# Patient Record
Sex: FEMALE | Race: WHITE | Employment: FULL TIME | URBAN - METROPOLITAN AREA
[De-identification: names, ages, dates, MRNs, and addresses within clinical notes are randomized per-mention and may not be internally consistent; named-entity substitution may affect disease eponyms.]

---

## 2017-05-27 LAB
ALBUMIN SERPL-MCNC: NORMAL G/DL
ALP BLD-CCNC: NORMAL U/L
ALT SERPL-CCNC: NORMAL U/L
ANION GAP SERPL CALCULATED.3IONS-SCNC: NORMAL MMOL/L
AST SERPL-CCNC: NORMAL U/L
BILIRUB SERPL-MCNC: NORMAL MG/DL (ref 0.1–1.4)
BUN BLDV-MCNC: NORMAL MG/DL
CALCIUM SERPL-MCNC: NORMAL MG/DL
CHLORIDE BLD-SCNC: NORMAL MMOL/L
CHOLESTEROL, TOTAL: NORMAL MG/DL
CHOLESTEROL/HDL RATIO: NORMAL
CO2: NORMAL MMOL/L
CREAT SERPL-MCNC: NORMAL MG/DL
GFR CALCULATED: NORMAL
GLUCOSE BLD-MCNC: NORMAL MG/DL
HDLC SERPL-MCNC: NORMAL MG/DL (ref 35–70)
LDL CHOLESTEROL CALCULATED: NORMAL MG/DL (ref 0–160)
POTASSIUM SERPL-SCNC: NORMAL MMOL/L
SODIUM BLD-SCNC: NORMAL MMOL/L
TOTAL PROTEIN: NORMAL
TRIGL SERPL-MCNC: NORMAL MG/DL
VLDLC SERPL CALC-MCNC: NORMAL MG/DL

## 2019-09-16 RX ORDER — DESVENLAFAXINE 50 MG/1
TABLET, EXTENDED RELEASE ORAL
Qty: 90 TABLET | Refills: 2 | Status: SHIPPED
Start: 2019-09-16 | End: 2020-06-10 | Stop reason: SDUPTHER

## 2019-09-16 RX ORDER — PANTOPRAZOLE SODIUM 40 MG/1
TABLET, DELAYED RELEASE ORAL
Qty: 90 TABLET | Refills: 2 | Status: SHIPPED
Start: 2019-09-16 | End: 2020-06-10 | Stop reason: SDUPTHER

## 2019-09-16 RX ORDER — ROSUVASTATIN CALCIUM 5 MG/1
TABLET, COATED ORAL
Qty: 90 TABLET | Refills: 2 | Status: SHIPPED
Start: 2019-09-16 | End: 2020-06-10 | Stop reason: SDUPTHER

## 2019-10-11 RX ORDER — METFORMIN HYDROCHLORIDE 500 MG/1
TABLET, EXTENDED RELEASE ORAL
COMMUNITY
Start: 2018-09-25 | End: 2020-05-05 | Stop reason: SDUPTHER

## 2019-10-11 RX ORDER — IBUPROFEN 800 MG/1
TABLET ORAL
Status: ON HOLD | COMMUNITY
Start: 2016-06-06 | End: 2019-10-22 | Stop reason: HOSPADM

## 2019-10-11 RX ORDER — AMITRIPTYLINE HYDROCHLORIDE 25 MG/1
25 TABLET, FILM COATED ORAL NIGHTLY
COMMUNITY
Start: 2018-06-25 | End: 2020-03-24 | Stop reason: SDUPTHER

## 2019-10-21 ENCOUNTER — HOSPITAL ENCOUNTER (OUTPATIENT)
Age: 54
Setting detail: OBSERVATION
Discharge: HOME | End: 2019-10-22
Attending: EMERGENCY MEDICINE | Admitting: INTERNAL MEDICINE

## 2019-10-21 ENCOUNTER — APPOINTMENT (OUTPATIENT)
Dept: GENERAL RADIOLOGY | Age: 54
End: 2019-10-21

## 2019-10-21 DIAGNOSIS — I10 HYPERTENSION, UNSPECIFIED TYPE: ICD-10-CM

## 2019-10-21 DIAGNOSIS — R07.9 CHEST PAIN, UNSPECIFIED TYPE: Primary | ICD-10-CM

## 2019-10-21 LAB
ALBUMIN SERPL-MCNC: 4.4 G/DL (ref 3.5–5.2)
ALP BLD-CCNC: 95 U/L (ref 35–104)
ALT SERPL-CCNC: 36 U/L (ref 0–32)
ANION GAP SERPL CALCULATED.3IONS-SCNC: 11 MMOL/L (ref 7–16)
AST SERPL-CCNC: 27 U/L (ref 0–31)
BASOPHILS ABSOLUTE: 0.07 E9/L (ref 0–0.2)
BASOPHILS RELATIVE PERCENT: 1.1 % (ref 0–2)
BILIRUB SERPL-MCNC: 0.5 MG/DL (ref 0–1.2)
BUN BLDV-MCNC: 16 MG/DL (ref 6–20)
CALCIUM SERPL-MCNC: 9.6 MG/DL (ref 8.6–10.2)
CHLORIDE BLD-SCNC: 104 MMOL/L (ref 98–107)
CHOLESTEROL, TOTAL: 137 MG/DL (ref 0–199)
CO2: 27 MMOL/L (ref 22–29)
CREAT SERPL-MCNC: 0.9 MG/DL (ref 0.5–1)
EKG ATRIAL RATE: 66 BPM
EKG P AXIS: 50 DEGREES
EKG P-R INTERVAL: 152 MS
EKG Q-T INTERVAL: 422 MS
EKG QRS DURATION: 84 MS
EKG QTC CALCULATION (BAZETT): 442 MS
EKG R AXIS: 12 DEGREES
EKG T AXIS: 41 DEGREES
EKG VENTRICULAR RATE: 66 BPM
EOSINOPHILS ABSOLUTE: 0.25 E9/L (ref 0.05–0.5)
EOSINOPHILS RELATIVE PERCENT: 3.9 % (ref 0–6)
GFR AFRICAN AMERICAN: >60
GFR NON-AFRICAN AMERICAN: >60 ML/MIN/1.73
GLUCOSE BLD-MCNC: 159 MG/DL (ref 74–99)
HBA1C MFR BLD: 6.1 % (ref 4–5.6)
HCT VFR BLD CALC: 40 % (ref 34–48)
HDLC SERPL-MCNC: 31 MG/DL
HEMOGLOBIN: 13.2 G/DL (ref 11.5–15.5)
IMMATURE GRANULOCYTES #: 0.03 E9/L
IMMATURE GRANULOCYTES %: 0.5 % (ref 0–5)
LDL CHOLESTEROL CALCULATED: 77 MG/DL (ref 0–99)
LYMPHOCYTES ABSOLUTE: 1.36 E9/L (ref 1.5–4)
LYMPHOCYTES RELATIVE PERCENT: 21.1 % (ref 20–42)
MCH RBC QN AUTO: 30.3 PG (ref 26–35)
MCHC RBC AUTO-ENTMCNC: 33 % (ref 32–34.5)
MCV RBC AUTO: 91.7 FL (ref 80–99.9)
MONOCYTES ABSOLUTE: 0.67 E9/L (ref 0.1–0.95)
MONOCYTES RELATIVE PERCENT: 10.4 % (ref 2–12)
NEUTROPHILS ABSOLUTE: 4.08 E9/L (ref 1.8–7.3)
NEUTROPHILS RELATIVE PERCENT: 63 % (ref 43–80)
PDW BLD-RTO: 12.9 FL (ref 11.5–15)
PLATELET # BLD: 328 E9/L (ref 130–450)
PMV BLD AUTO: 9 FL (ref 7–12)
POTASSIUM SERPL-SCNC: 4.2 MMOL/L (ref 3.5–5)
RBC # BLD: 4.36 E12/L (ref 3.5–5.5)
SODIUM BLD-SCNC: 142 MMOL/L (ref 132–146)
TOTAL PROTEIN: 7.8 G/DL (ref 6.4–8.3)
TRIGL SERPL-MCNC: 147 MG/DL (ref 0–149)
TROPONIN: <0.01 NG/ML (ref 0–0.03)
VLDLC SERPL CALC-MCNC: 29 MG/DL
WBC # BLD: 6.5 E9/L (ref 4.5–11.5)

## 2019-10-21 PROCEDURE — 71046 X-RAY EXAM CHEST 2 VIEWS: CPT

## 2019-10-21 PROCEDURE — 99243 OFF/OP CNSLTJ NEW/EST LOW 30: CPT | Performed by: INTERNAL MEDICINE

## 2019-10-21 PROCEDURE — 93010 ELECTROCARDIOGRAM REPORT: CPT | Performed by: INTERNAL MEDICINE

## 2019-10-21 PROCEDURE — G0378 HOSPITAL OBSERVATION PER HR: HCPCS

## 2019-10-21 PROCEDURE — 80061 LIPID PANEL: CPT

## 2019-10-21 PROCEDURE — 83036 HEMOGLOBIN GLYCOSYLATED A1C: CPT

## 2019-10-21 PROCEDURE — 2580000003 HC RX 258: Performed by: INTERNAL MEDICINE

## 2019-10-21 PROCEDURE — 80053 COMPREHEN METABOLIC PANEL: CPT

## 2019-10-21 PROCEDURE — 84484 ASSAY OF TROPONIN QUANT: CPT

## 2019-10-21 PROCEDURE — 6370000000 HC RX 637 (ALT 250 FOR IP): Performed by: NURSE PRACTITIONER

## 2019-10-21 PROCEDURE — 6370000000 HC RX 637 (ALT 250 FOR IP): Performed by: INTERNAL MEDICINE

## 2019-10-21 PROCEDURE — 93005 ELECTROCARDIOGRAM TRACING: CPT | Performed by: EMERGENCY MEDICINE

## 2019-10-21 PROCEDURE — 85025 COMPLETE CBC W/AUTO DIFF WBC: CPT

## 2019-10-21 PROCEDURE — 2580000003 HC RX 258: Performed by: NURSE PRACTITIONER

## 2019-10-21 PROCEDURE — 99285 EMERGENCY DEPT VISIT HI MDM: CPT

## 2019-10-21 PROCEDURE — 36415 COLL VENOUS BLD VENIPUNCTURE: CPT

## 2019-10-21 RX ORDER — PANTOPRAZOLE SODIUM 40 MG/1
40 TABLET, DELAYED RELEASE ORAL DAILY
Status: DISCONTINUED | OUTPATIENT
Start: 2019-10-21 | End: 2019-10-22 | Stop reason: HOSPADM

## 2019-10-21 RX ORDER — AMITRIPTYLINE HYDROCHLORIDE 25 MG/1
25 TABLET, FILM COATED ORAL NIGHTLY
Status: DISCONTINUED | OUTPATIENT
Start: 2019-10-21 | End: 2019-10-22 | Stop reason: HOSPADM

## 2019-10-21 RX ORDER — CHOLECALCIFEROL (VITAMIN D3) 125 MCG
10 CAPSULE ORAL NIGHTLY
Status: DISCONTINUED | OUTPATIENT
Start: 2019-10-21 | End: 2019-10-22 | Stop reason: HOSPADM

## 2019-10-21 RX ORDER — DEXTROSE MONOHYDRATE 25 G/50ML
12.5 INJECTION, SOLUTION INTRAVENOUS PRN
Status: DISCONTINUED | OUTPATIENT
Start: 2019-10-21 | End: 2019-10-22 | Stop reason: HOSPADM

## 2019-10-21 RX ORDER — ROSUVASTATIN CALCIUM 5 MG/1
5 TABLET, COATED ORAL NIGHTLY
Status: DISCONTINUED | OUTPATIENT
Start: 2019-10-21 | End: 2019-10-22 | Stop reason: HOSPADM

## 2019-10-21 RX ORDER — ASPIRIN 81 MG/1
81 TABLET, CHEWABLE ORAL DAILY
Status: DISCONTINUED | OUTPATIENT
Start: 2019-10-21 | End: 2019-10-22 | Stop reason: HOSPADM

## 2019-10-21 RX ORDER — SODIUM CHLORIDE 0.9 % (FLUSH) 0.9 %
10 SYRINGE (ML) INJECTION PRN
Status: DISCONTINUED | OUTPATIENT
Start: 2019-10-21 | End: 2019-10-22 | Stop reason: HOSPADM

## 2019-10-21 RX ORDER — ONDANSETRON 2 MG/ML
4 INJECTION INTRAMUSCULAR; INTRAVENOUS EVERY 6 HOURS PRN
Status: DISCONTINUED | OUTPATIENT
Start: 2019-10-21 | End: 2019-10-22 | Stop reason: HOSPADM

## 2019-10-21 RX ORDER — METFORMIN HYDROCHLORIDE 500 MG/1
500 TABLET, EXTENDED RELEASE ORAL
Status: DISCONTINUED | OUTPATIENT
Start: 2019-10-22 | End: 2019-10-22 | Stop reason: HOSPADM

## 2019-10-21 RX ORDER — NITROGLYCERIN 0.4 MG/1
0.4 TABLET SUBLINGUAL ONCE
Status: COMPLETED | OUTPATIENT
Start: 2019-10-21 | End: 2019-10-21

## 2019-10-21 RX ORDER — 0.9 % SODIUM CHLORIDE 0.9 %
500 INTRAVENOUS SOLUTION INTRAVENOUS ONCE
Status: COMPLETED | OUTPATIENT
Start: 2019-10-21 | End: 2019-10-21

## 2019-10-21 RX ORDER — NICOTINE POLACRILEX 4 MG
15 LOZENGE BUCCAL PRN
Status: DISCONTINUED | OUTPATIENT
Start: 2019-10-21 | End: 2019-10-22 | Stop reason: HOSPADM

## 2019-10-21 RX ORDER — DESVENLAFAXINE 50 MG/1
50 TABLET, EXTENDED RELEASE ORAL DAILY
Status: DISCONTINUED | OUTPATIENT
Start: 2019-10-21 | End: 2019-10-22 | Stop reason: HOSPADM

## 2019-10-21 RX ORDER — SODIUM CHLORIDE 0.9 % (FLUSH) 0.9 %
10 SYRINGE (ML) INJECTION EVERY 12 HOURS SCHEDULED
Status: DISCONTINUED | OUTPATIENT
Start: 2019-10-21 | End: 2019-10-22 | Stop reason: HOSPADM

## 2019-10-21 RX ORDER — DEXTROSE MONOHYDRATE 50 MG/ML
100 INJECTION, SOLUTION INTRAVENOUS PRN
Status: DISCONTINUED | OUTPATIENT
Start: 2019-10-21 | End: 2019-10-22 | Stop reason: HOSPADM

## 2019-10-21 RX ORDER — ASPIRIN 81 MG/1
324 TABLET, CHEWABLE ORAL ONCE
Status: COMPLETED | OUTPATIENT
Start: 2019-10-21 | End: 2019-10-21

## 2019-10-21 RX ADMIN — ROSUVASTATIN CALCIUM 5 MG: 5 TABLET, FILM COATED ORAL at 21:58

## 2019-10-21 RX ADMIN — SODIUM CHLORIDE 500 ML: 9 INJECTION, SOLUTION INTRAVENOUS at 12:50

## 2019-10-21 RX ADMIN — NITROGLYCERIN 0.4 MG: 0.4 TABLET, ORALLY DISINTEGRATING SUBLINGUAL at 12:50

## 2019-10-21 RX ADMIN — Medication 10 MG: at 21:58

## 2019-10-21 RX ADMIN — AMITRIPTYLINE HYDROCHLORIDE 25 MG: 25 TABLET, FILM COATED ORAL at 21:57

## 2019-10-21 RX ADMIN — Medication 10 ML: at 21:58

## 2019-10-21 RX ADMIN — ASPIRIN 81 MG 324 MG: 81 TABLET ORAL at 12:50

## 2019-10-21 ASSESSMENT — PAIN DESCRIPTION - PAIN TYPE: TYPE: ACUTE PAIN

## 2019-10-21 ASSESSMENT — PAIN SCALES - GENERAL
PAINLEVEL_OUTOF10: 1
PAINLEVEL_OUTOF10: 4

## 2019-10-21 ASSESSMENT — PAIN DESCRIPTION - LOCATION: LOCATION: CHEST

## 2019-10-22 ENCOUNTER — APPOINTMENT (OUTPATIENT)
Dept: NON INVASIVE DIAGNOSTICS | Age: 54
End: 2019-10-22

## 2019-10-22 VITALS
HEART RATE: 63 BPM | RESPIRATION RATE: 18 BRPM | OXYGEN SATURATION: 98 % | TEMPERATURE: 97.5 F | DIASTOLIC BLOOD PRESSURE: 84 MMHG | BODY MASS INDEX: 39.02 KG/M2 | SYSTOLIC BLOOD PRESSURE: 134 MMHG | HEIGHT: 63 IN | WEIGHT: 220.2 LBS

## 2019-10-22 LAB
ANION GAP SERPL CALCULATED.3IONS-SCNC: 12 MMOL/L (ref 7–16)
BUN BLDV-MCNC: 12 MG/DL (ref 6–20)
CALCIUM SERPL-MCNC: 9.4 MG/DL (ref 8.6–10.2)
CHLORIDE BLD-SCNC: 103 MMOL/L (ref 98–107)
CO2: 25 MMOL/L (ref 22–29)
CREAT SERPL-MCNC: 0.9 MG/DL (ref 0.5–1)
GFR AFRICAN AMERICAN: >60
GFR NON-AFRICAN AMERICAN: >60 ML/MIN/1.73
GLUCOSE BLD-MCNC: 150 MG/DL (ref 74–99)
POTASSIUM REFLEX MAGNESIUM: 4.3 MMOL/L (ref 3.5–5)
SODIUM BLD-SCNC: 140 MMOL/L (ref 132–146)

## 2019-10-22 PROCEDURE — 2580000003 HC RX 258: Performed by: INTERNAL MEDICINE

## 2019-10-22 PROCEDURE — 96374 THER/PROPH/DIAG INJ IV PUSH: CPT

## 2019-10-22 PROCEDURE — 6370000000 HC RX 637 (ALT 250 FOR IP): Performed by: INTERNAL MEDICINE

## 2019-10-22 PROCEDURE — G0378 HOSPITAL OBSERVATION PER HR: HCPCS

## 2019-10-22 PROCEDURE — 96372 THER/PROPH/DIAG INJ SC/IM: CPT

## 2019-10-22 PROCEDURE — 80048 BASIC METABOLIC PNL TOTAL CA: CPT

## 2019-10-22 PROCEDURE — 93017 CV STRESS TEST TRACING ONLY: CPT

## 2019-10-22 PROCEDURE — 6360000002 HC RX W HCPCS: Performed by: INTERNAL MEDICINE

## 2019-10-22 PROCEDURE — 99214 OFFICE O/P EST MOD 30 MIN: CPT | Performed by: INTERNAL MEDICINE

## 2019-10-22 PROCEDURE — 36415 COLL VENOUS BLD VENIPUNCTURE: CPT

## 2019-10-22 RX ORDER — KETOROLAC TROMETHAMINE 30 MG/ML
15 INJECTION, SOLUTION INTRAMUSCULAR; INTRAVENOUS ONCE
Status: COMPLETED | OUTPATIENT
Start: 2019-10-22 | End: 2019-10-22

## 2019-10-22 RX ORDER — SUCRALFATE 1 G/1
1 TABLET ORAL EVERY 6 HOURS SCHEDULED
Status: DISCONTINUED | OUTPATIENT
Start: 2019-10-22 | End: 2019-10-22 | Stop reason: HOSPADM

## 2019-10-22 RX ORDER — LISINOPRIL 5 MG/1
5 TABLET ORAL DAILY
Status: DISCONTINUED | OUTPATIENT
Start: 2019-10-22 | End: 2019-10-22 | Stop reason: HOSPADM

## 2019-10-22 RX ORDER — SUCRALFATE 1 G/1
1 TABLET ORAL
Qty: 60 TABLET | Refills: 3 | Status: SHIPPED | OUTPATIENT
Start: 2019-10-22 | End: 2019-10-29

## 2019-10-22 RX ORDER — LISINOPRIL 5 MG/1
5 TABLET ORAL DAILY
Qty: 30 TABLET | Refills: 3 | Status: SHIPPED | OUTPATIENT
Start: 2019-10-23 | End: 2019-10-29 | Stop reason: SDUPTHER

## 2019-10-22 RX ADMIN — PANTOPRAZOLE SODIUM 40 MG: 40 TABLET, DELAYED RELEASE ORAL at 08:12

## 2019-10-22 RX ADMIN — METFORMIN HYDROCHLORIDE 500 MG: 500 TABLET, EXTENDED RELEASE ORAL at 08:12

## 2019-10-22 RX ADMIN — LISINOPRIL 5 MG: 5 TABLET ORAL at 08:36

## 2019-10-22 RX ADMIN — Medication 10 ML: at 08:13

## 2019-10-22 RX ADMIN — ENOXAPARIN SODIUM 40 MG: 40 INJECTION SUBCUTANEOUS at 08:12

## 2019-10-22 RX ADMIN — KETOROLAC TROMETHAMINE 15 MG: 30 INJECTION, SOLUTION INTRAMUSCULAR at 08:13

## 2019-10-22 RX ADMIN — DESVENLAFAXINE SUCCINATE 50 MG: 50 TABLET, FILM COATED, EXTENDED RELEASE ORAL at 08:12

## 2019-10-22 RX ADMIN — SUCRALFATE 1 G: 1 TABLET ORAL at 06:32

## 2019-10-22 RX ADMIN — ASPIRIN 81 MG CHEWABLE TABLET 81 MG: 81 TABLET CHEWABLE at 08:22

## 2019-10-22 ASSESSMENT — PAIN SCALES - GENERAL: PAINLEVEL_OUTOF10: 3

## 2019-10-22 ASSESSMENT — PAIN DESCRIPTION - LOCATION: LOCATION: HEAD

## 2019-10-22 ASSESSMENT — PAIN DESCRIPTION - PAIN TYPE: TYPE: ACUTE PAIN

## 2019-10-29 ENCOUNTER — OFFICE VISIT (OUTPATIENT)
Dept: PRIMARY CARE CLINIC | Age: 54
End: 2019-10-29

## 2019-10-29 VITALS
HEART RATE: 97 BPM | WEIGHT: 227 LBS | DIASTOLIC BLOOD PRESSURE: 90 MMHG | OXYGEN SATURATION: 98 % | BODY MASS INDEX: 40.21 KG/M2 | SYSTOLIC BLOOD PRESSURE: 146 MMHG | TEMPERATURE: 97 F

## 2019-10-29 DIAGNOSIS — Z12.11 SPECIAL SCREENING FOR MALIGNANT NEOPLASMS, COLON: ICD-10-CM

## 2019-10-29 DIAGNOSIS — R07.89 OTHER CHEST PAIN: ICD-10-CM

## 2019-10-29 DIAGNOSIS — Z12.31 ENCOUNTER FOR SCREENING MAMMOGRAM FOR MALIGNANT NEOPLASM OF BREAST: ICD-10-CM

## 2019-10-29 DIAGNOSIS — K21.00 GASTROESOPHAGEAL REFLUX DISEASE WITH ESOPHAGITIS: Primary | ICD-10-CM

## 2019-10-29 DIAGNOSIS — Z13.31 POSITIVE DEPRESSION SCREENING: ICD-10-CM

## 2019-10-29 PROCEDURE — G8431 POS CLIN DEPRES SCRN F/U DOC: HCPCS | Performed by: FAMILY MEDICINE

## 2019-10-29 PROCEDURE — 99214 OFFICE O/P EST MOD 30 MIN: CPT | Performed by: FAMILY MEDICINE

## 2019-10-29 PROCEDURE — G0444 DEPRESSION SCREEN ANNUAL: HCPCS | Performed by: FAMILY MEDICINE

## 2019-10-29 RX ORDER — LISINOPRIL 5 MG/1
5 TABLET ORAL DAILY
Qty: 30 TABLET | Refills: 3 | Status: SHIPPED | OUTPATIENT
Start: 2019-10-29 | End: 2019-11-26 | Stop reason: ALTCHOICE

## 2019-10-29 ASSESSMENT — ENCOUNTER SYMPTOMS
ALLERGIC/IMMUNOLOGIC NEGATIVE: 1
RESPIRATORY NEGATIVE: 1
EYES NEGATIVE: 1
GASTROINTESTINAL NEGATIVE: 1

## 2019-10-29 ASSESSMENT — PATIENT HEALTH QUESTIONNAIRE - PHQ9
SUM OF ALL RESPONSES TO PHQ QUESTIONS 1-9: 13
6. FEELING BAD ABOUT YOURSELF - OR THAT YOU ARE A FAILURE OR HAVE LET YOURSELF OR YOUR FAMILY DOWN: 2
1. LITTLE INTEREST OR PLEASURE IN DOING THINGS: 2
SUM OF ALL RESPONSES TO PHQ9 QUESTIONS 1 & 2: 4
7. TROUBLE CONCENTRATING ON THINGS, SUCH AS READING THE NEWSPAPER OR WATCHING TELEVISION: 1
5. POOR APPETITE OR OVEREATING: 2
3. TROUBLE FALLING OR STAYING ASLEEP: 1
9. THOUGHTS THAT YOU WOULD BE BETTER OFF DEAD, OR OF HURTING YOURSELF: 1
8. MOVING OR SPEAKING SO SLOWLY THAT OTHER PEOPLE COULD HAVE NOTICED. OR THE OPPOSITE, BEING SO FIGETY OR RESTLESS THAT YOU HAVE BEEN MOVING AROUND A LOT MORE THAN USUAL: 0
4. FEELING TIRED OR HAVING LITTLE ENERGY: 2
2. FEELING DOWN, DEPRESSED OR HOPELESS: 2
SUM OF ALL RESPONSES TO PHQ QUESTIONS 1-9: 13

## 2019-10-30 ENCOUNTER — TELEPHONE (OUTPATIENT)
Dept: NON INVASIVE DIAGNOSTICS | Age: 54
End: 2019-10-30

## 2019-11-26 ENCOUNTER — OFFICE VISIT (OUTPATIENT)
Dept: PRIMARY CARE CLINIC | Age: 54
End: 2019-11-26

## 2019-11-26 VITALS
OXYGEN SATURATION: 97 % | HEART RATE: 76 BPM | SYSTOLIC BLOOD PRESSURE: 120 MMHG | DIASTOLIC BLOOD PRESSURE: 76 MMHG | BODY MASS INDEX: 40.03 KG/M2 | WEIGHT: 226 LBS | TEMPERATURE: 97.6 F

## 2019-11-26 DIAGNOSIS — I10 ESSENTIAL HYPERTENSION: ICD-10-CM

## 2019-11-26 DIAGNOSIS — R73.01 IMPAIRED FASTING GLUCOSE: ICD-10-CM

## 2019-11-26 DIAGNOSIS — E78.5 HYPERLIPIDEMIA, UNSPECIFIED HYPERLIPIDEMIA TYPE: ICD-10-CM

## 2019-11-26 DIAGNOSIS — Z13.31 POSITIVE DEPRESSION SCREENING: ICD-10-CM

## 2019-11-26 DIAGNOSIS — R07.89 OTHER CHEST PAIN: ICD-10-CM

## 2019-11-26 DIAGNOSIS — K21.00 GASTROESOPHAGEAL REFLUX DISEASE WITH ESOPHAGITIS: Primary | ICD-10-CM

## 2019-11-26 PROCEDURE — 99214 OFFICE O/P EST MOD 30 MIN: CPT | Performed by: FAMILY MEDICINE

## 2019-11-26 ASSESSMENT — ENCOUNTER SYMPTOMS
ALLERGIC/IMMUNOLOGIC NEGATIVE: 1
EYES NEGATIVE: 1
GASTROINTESTINAL NEGATIVE: 1
RESPIRATORY NEGATIVE: 1

## 2019-11-28 PROBLEM — Z12.31 ENCOUNTER FOR SCREENING MAMMOGRAM FOR MALIGNANT NEOPLASM OF BREAST: Status: RESOLVED | Noted: 2019-10-29 | Resolved: 2019-11-28

## 2020-03-24 ENCOUNTER — TELEPHONE (OUTPATIENT)
Dept: ADMINISTRATIVE | Age: 55
End: 2020-03-24

## 2020-03-24 RX ORDER — AMITRIPTYLINE HYDROCHLORIDE 25 MG/1
25 TABLET, FILM COATED ORAL NIGHTLY
Qty: 90 TABLET | Refills: 1 | Status: SHIPPED | OUTPATIENT
Start: 2020-03-24 | End: 2020-03-26 | Stop reason: SDUPTHER

## 2020-03-26 RX ORDER — AMITRIPTYLINE HYDROCHLORIDE 25 MG/1
25 TABLET, FILM COATED ORAL NIGHTLY
Qty: 90 TABLET | Refills: 1 | Status: SHIPPED
Start: 2020-03-26 | End: 2020-06-10 | Stop reason: SDUPTHER

## 2020-05-05 RX ORDER — METFORMIN HYDROCHLORIDE 500 MG/1
500 TABLET, EXTENDED RELEASE ORAL
Qty: 90 TABLET | Refills: 1 | Status: SHIPPED
Start: 2020-05-05 | End: 2020-06-10 | Stop reason: SDUPTHER

## 2020-06-08 LAB
ALBUMIN SERPL-MCNC: 4.1 G/DL
ALP BLD-CCNC: 95 U/L
ALT SERPL-CCNC: 43 U/L
ANION GAP SERPL CALCULATED.3IONS-SCNC: NORMAL MMOL/L
AST SERPL-CCNC: 17 U/L
AVERAGE GLUCOSE: 146
BASOPHILS ABSOLUTE: 1.1 /ΜL
BASOPHILS RELATIVE PERCENT: 0.06 %
BILIRUB SERPL-MCNC: 0.8 MG/DL (ref 0.1–1.4)
BUN BLDV-MCNC: 13 MG/DL
CALCIUM SERPL-MCNC: 9.1 MG/DL
CHLORIDE BLD-SCNC: 107 MMOL/L
CHOLESTEROL, TOTAL: 164 MG/DL
CHOLESTEROL/HDL RATIO: 5
CO2: 28 MMOL/L
CREAT SERPL-MCNC: 1.09 MG/DL
EOSINOPHILS ABSOLUTE: 5.5 /ΜL
EOSINOPHILS RELATIVE PERCENT: 0.31 %
GFR CALCULATED: NORMAL
GLUCOSE BLD-MCNC: 145 MG/DL
HBA1C MFR BLD: 6.7 %
HCT VFR BLD CALC: 39.8 % (ref 36–46)
HDLC SERPL-MCNC: 32 MG/DL (ref 35–70)
HEMOGLOBIN: 13.6 G/DL (ref 12–16)
LDL CHOLESTEROL CALCULATED: 91 MG/DL (ref 0–160)
LYMPHOCYTES ABSOLUTE: 23.5 /ΜL
LYMPHOCYTES RELATIVE PERCENT: 1.33 %
MCH RBC QN AUTO: 30.9 PG
MCHC RBC AUTO-ENTMCNC: 34.2 G/DL
MCV RBC AUTO: 90.5 FL
MONOCYTES ABSOLUTE: 11 /ΜL
MONOCYTES RELATIVE PERCENT: 0.62 %
NEUTROPHILS ABSOLUTE: 58.5 /ΜL
NEUTROPHILS RELATIVE PERCENT: 3.32 %
PDW BLD-RTO: 42.9 %
PLATELET # BLD: 353 K/ΜL
PMV BLD AUTO: 9.5 FL
POTASSIUM SERPL-SCNC: 4.7 MMOL/L
RBC # BLD: 4.4 10^6/ΜL
SODIUM BLD-SCNC: 140 MMOL/L
T4 TOTAL: 7.5
TOTAL PROTEIN: 7.5
TRIGL SERPL-MCNC: 203 MG/DL
TSH SERPL DL<=0.05 MIU/L-ACNC: 1.71 UIU/ML
VLDLC SERPL CALC-MCNC: ABNORMAL MG/DL
WBC # BLD: 5.7 10^3/ML

## 2020-06-10 ENCOUNTER — OFFICE VISIT (OUTPATIENT)
Dept: PRIMARY CARE CLINIC | Age: 55
End: 2020-06-10
Payer: COMMERCIAL

## 2020-06-10 VITALS
RESPIRATION RATE: 16 BRPM | TEMPERATURE: 97.8 F | WEIGHT: 228.4 LBS | DIASTOLIC BLOOD PRESSURE: 72 MMHG | HEART RATE: 71 BPM | OXYGEN SATURATION: 97 % | SYSTOLIC BLOOD PRESSURE: 128 MMHG | BODY MASS INDEX: 40.46 KG/M2

## 2020-06-10 PROBLEM — F41.9 ANXIETY: Status: ACTIVE | Noted: 2020-06-10

## 2020-06-10 PROBLEM — F32.A DEPRESSION: Status: ACTIVE | Noted: 2020-06-10

## 2020-06-10 PROBLEM — R73.01 IMPAIRED FASTING GLUCOSE: Status: ACTIVE | Noted: 2020-06-10

## 2020-06-10 PROCEDURE — 1036F TOBACCO NON-USER: CPT | Performed by: FAMILY MEDICINE

## 2020-06-10 PROCEDURE — G8427 DOCREV CUR MEDS BY ELIG CLIN: HCPCS | Performed by: FAMILY MEDICINE

## 2020-06-10 PROCEDURE — 99214 OFFICE O/P EST MOD 30 MIN: CPT | Performed by: FAMILY MEDICINE

## 2020-06-10 PROCEDURE — 3017F COLORECTAL CA SCREEN DOC REV: CPT | Performed by: FAMILY MEDICINE

## 2020-06-10 PROCEDURE — G8417 CALC BMI ABV UP PARAM F/U: HCPCS | Performed by: FAMILY MEDICINE

## 2020-06-10 RX ORDER — DESVENLAFAXINE 50 MG/1
50 TABLET, EXTENDED RELEASE ORAL DAILY
Qty: 90 TABLET | Refills: 3 | Status: SHIPPED
Start: 2020-06-10 | End: 2021-02-04 | Stop reason: SDUPTHER

## 2020-06-10 RX ORDER — PANTOPRAZOLE SODIUM 40 MG/1
40 TABLET, DELAYED RELEASE ORAL DAILY
Qty: 90 TABLET | Refills: 3 | Status: SHIPPED
Start: 2020-06-10 | End: 2021-02-04 | Stop reason: SDUPTHER

## 2020-06-10 RX ORDER — METFORMIN HYDROCHLORIDE 500 MG/1
500 TABLET, EXTENDED RELEASE ORAL
Qty: 90 TABLET | Refills: 3 | Status: SHIPPED
Start: 2020-06-10 | End: 2021-02-04 | Stop reason: SDUPTHER

## 2020-06-10 RX ORDER — ROSUVASTATIN CALCIUM 5 MG/1
5 TABLET, COATED ORAL DAILY
Qty: 90 TABLET | Refills: 3 | Status: SHIPPED
Start: 2020-06-10 | End: 2021-02-04 | Stop reason: SDUPTHER

## 2020-06-10 RX ORDER — AMITRIPTYLINE HYDROCHLORIDE 25 MG/1
25 TABLET, FILM COATED ORAL NIGHTLY
Qty: 90 TABLET | Refills: 3 | Status: SHIPPED
Start: 2020-06-10 | End: 2021-02-04 | Stop reason: SDUPTHER

## 2020-06-10 RX ORDER — SUCRALFATE 1 G/1
TABLET ORAL
COMMUNITY
Start: 2020-03-14 | End: 2022-02-06

## 2020-06-10 ASSESSMENT — PATIENT HEALTH QUESTIONNAIRE - PHQ9
2. FEELING DOWN, DEPRESSED OR HOPELESS: 1
SUM OF ALL RESPONSES TO PHQ9 QUESTIONS 1 & 2: 2
1. LITTLE INTEREST OR PLEASURE IN DOING THINGS: 1
SUM OF ALL RESPONSES TO PHQ QUESTIONS 1-9: 2
SUM OF ALL RESPONSES TO PHQ QUESTIONS 1-9: 2

## 2020-06-10 ASSESSMENT — ENCOUNTER SYMPTOMS
ALLERGIC/IMMUNOLOGIC NEGATIVE: 1
EYES NEGATIVE: 1
RESPIRATORY NEGATIVE: 1
GASTROINTESTINAL NEGATIVE: 1

## 2020-06-10 NOTE — PROGRESS NOTES
6/10/20     Smitha Mcnair    : 1965 Sex: female   Age: 53 y.o. Chief Complaint   Patient presents with    Gastroesophageal Reflux    Hypertension    Hyperlipidemia    Discuss Labs       Prior to Admission medications    Medication Sig Start Date End Date Taking? Authorizing Provider   sucralfate (CARAFATE) 1 GM tablet Prn 3/14/20  Yes Historical Provider, MD   rosuvastatin (CRESTOR) 5 MG tablet Take 1 tablet by mouth daily 6/10/20  Yes Maria Elena Oates DO   pantoprazole (PROTONIX) 40 MG tablet Take 1 tablet by mouth daily 6/10/20  Yes Maria Elena Oates DO   desvenlafaxine succinate (PRISTIQ) 50 MG TB24 extended release tablet Take 1 tablet by mouth daily 6/10/20  Yes Maria Elena Oates DO   amitriptyline (ELAVIL) 25 MG tablet Take 1 tablet by mouth nightly 6/10/20  Yes Maria Elena Oates DO   metFORMIN (GLUCOPHAGE-XR) 500 MG extended release tablet Take 1 tablet by mouth daily (with breakfast) 6/10/20  Yes Marcelina Dominguez DO          HPI:           Review of Systems   Constitutional: Negative. HENT: Negative. Eyes: Negative. Respiratory: Negative. Gastrointestinal: Negative. Endocrine: Negative. Genitourinary: Negative. Musculoskeletal: Negative. Skin: Negative. Allergic/Immunologic: Negative. Neurological: Negative. Hematological: Negative. Psychiatric/Behavioral: Negative.                Current Outpatient Medications:     sucralfate (CARAFATE) 1 GM tablet, Prn, Disp: , Rfl:     rosuvastatin (CRESTOR) 5 MG tablet, Take 1 tablet by mouth daily, Disp: 90 tablet, Rfl: 3    pantoprazole (PROTONIX) 40 MG tablet, Take 1 tablet by mouth daily, Disp: 90 tablet, Rfl: 3    desvenlafaxine succinate (PRISTIQ) 50 MG TB24 extended release tablet, Take 1 tablet by mouth daily, Disp: 90 tablet, Rfl: 3    amitriptyline (ELAVIL) 25 MG tablet, Take 1 tablet by mouth nightly, Disp: 90 tablet, Rfl: 3    metFORMIN (GLUCOPHAGE-XR) 500 MG extended release tablet, Take 1
release tablet, Take 1 tablet by mouth daily, Disp: 90 tablet, Rfl: 3    amitriptyline (ELAVIL) 25 MG tablet, Take 1 tablet by mouth nightly, Disp: 90 tablet, Rfl: 3    metFORMIN (GLUCOPHAGE-XR) 500 MG extended release tablet, Take 1 tablet by mouth daily (with breakfast), Disp: 90 tablet, Rfl: 3    Allergies   Allergen Reactions    Iodine        Social History     Tobacco Use    Smoking status: Never Smoker    Smokeless tobacco: Never Used   Substance Use Topics    Alcohol use: Not on file    Drug use: Not on file      No past surgical history on file. Family History   Problem Relation Age of Onset    Diabetes Father     Heart Disease Father      Past Medical History:   Diagnosis Date    Depression     Gastritis     Hyperlipidemia        Vitals:    06/10/20 0821   BP: 128/72   Pulse: 71   Resp: 16   Temp: 97.8 °F (36.6 °C)   TempSrc: Temporal   SpO2: 97%   Weight: 228 lb 6.4 oz (103.6 kg)     BP Readings from Last 3 Encounters:   06/10/20 128/72   11/26/19 120/76   10/29/19 (!) 146/90        Physical Exam  Vitals signs and nursing note reviewed. Constitutional:       Appearance: She is well-developed. HENT:      Head: Normocephalic. Right Ear: External ear normal.      Left Ear: External ear normal.      Nose: Nose normal.   Eyes:      Conjunctiva/sclera: Conjunctivae normal.      Pupils: Pupils are equal, round, and reactive to light. Neck:      Musculoskeletal: Normal range of motion and neck supple. Cardiovascular:      Rate and Rhythm: Normal rate. Pulmonary:      Breath sounds: Normal breath sounds. Abdominal:      General: Bowel sounds are normal.      Palpations: Abdomen is soft. Musculoskeletal: Normal range of motion. Skin:     General: Skin is warm and dry. Neurological:      Mental Status: She is alert and oriented to person, place, and time. Psychiatric:         Behavior: Behavior normal.     Present vitals physical examination stable.   Maintain medications as

## 2021-01-21 ENCOUNTER — TELEPHONE (OUTPATIENT)
Dept: ADMINISTRATIVE | Age: 56
End: 2021-01-21

## 2021-01-21 DIAGNOSIS — K29.50 CHRONIC GASTRITIS WITHOUT BLEEDING, UNSPECIFIED GASTRITIS TYPE: Primary | ICD-10-CM

## 2021-01-21 NOTE — TELEPHONE ENCOUNTER
Patient wants to add a h'pylori test added to her labs and needs the lab orders faxed to [phone]. That is her [employer]. They will draw the labs there. And questions please call her.  Thanks

## 2021-01-22 ENCOUNTER — OFFICE VISIT (OUTPATIENT)
Dept: FAMILY MEDICINE CLINIC | Age: 56
End: 2021-01-22
Payer: COMMERCIAL

## 2021-01-22 VITALS
RESPIRATION RATE: 18 BRPM | OXYGEN SATURATION: 97 % | SYSTOLIC BLOOD PRESSURE: 128 MMHG | BODY MASS INDEX: 40.4 KG/M2 | HEART RATE: 65 BPM | HEIGHT: 63 IN | TEMPERATURE: 97.7 F | WEIGHT: 228 LBS | DIASTOLIC BLOOD PRESSURE: 78 MMHG

## 2021-01-22 DIAGNOSIS — R21 RASH AND NONSPECIFIC SKIN ERUPTION: Primary | ICD-10-CM

## 2021-01-22 DIAGNOSIS — K21.9 GASTROESOPHAGEAL REFLUX DISEASE WITHOUT ESOPHAGITIS: ICD-10-CM

## 2021-01-22 LAB
ALBUMIN SERPL-MCNC: 4.3 G/DL
ALP BLD-CCNC: 100 U/L
ALT SERPL-CCNC: 43 U/L
ANION GAP SERPL CALCULATED.3IONS-SCNC: 10 MMOL/L
AST SERPL-CCNC: 12 U/L
AVERAGE GLUCOSE: 146
BASOPHILS ABSOLUTE: 0.07 /ΜL
BASOPHILS RELATIVE PERCENT: 0.9 %
BILIRUB SERPL-MCNC: 0.7 MG/DL (ref 0.1–1.4)
BUN BLDV-MCNC: 12 MG/DL
CALCIUM SERPL-MCNC: 9.7 MG/DL
CHLORIDE BLD-SCNC: 103 MMOL/L
CHOLESTEROL, TOTAL: 183 MG/DL
CHOLESTEROL/HDL RATIO: 5
CO2: 31 MMOL/L
CREAT SERPL-MCNC: 1.06 MG/DL
EOSINOPHILS ABSOLUTE: 0.44 /ΜL
EOSINOPHILS RELATIVE PERCENT: 5.5 %
GFR CALCULATED: 59
GLUCOSE BLD-MCNC: 146 MG/DL
HBA1C MFR BLD: 6.7 %
HCT VFR BLD CALC: 41.3 % (ref 36–46)
HDLC SERPL-MCNC: 39 MG/DL (ref 35–70)
HEMOGLOBIN: 14.4 G/DL (ref 12–16)
LDL CHOLESTEROL CALCULATED: 102 MG/DL (ref 0–160)
LYMPHOCYTES ABSOLUTE: 1.38 /ΜL
LYMPHOCYTES RELATIVE PERCENT: 17.4 %
MCH RBC QN AUTO: 31.9 PG
MCHC RBC AUTO-ENTMCNC: 34.9 G/DL
MCV RBC AUTO: 91.4 FL
MONOCYTES ABSOLUTE: 0.74 /ΜL
MONOCYTES RELATIVE PERCENT: 9.3 %
NEUTROPHILS ABSOLUTE: 5.25 /ΜL
NEUTROPHILS RELATIVE PERCENT: 66.1 %
NONHDLC SERPL-MCNC: NORMAL MG/DL
PDW BLD-RTO: 13.3 %
PLATELET # BLD: 367 K/ΜL
PMV BLD AUTO: 9.5 FL
POTASSIUM SERPL-SCNC: 4.7 MMOL/L
RBC # BLD: 4.52 10^6/ΜL
SODIUM BLD-SCNC: 139 MMOL/L
TOTAL PROTEIN: 7.8
TRIGL SERPL-MCNC: 208 MG/DL
VLDLC SERPL CALC-MCNC: NORMAL MG/DL
WBC # BLD: 7.9 10^3/ML

## 2021-01-22 PROCEDURE — 99214 OFFICE O/P EST MOD 30 MIN: CPT | Performed by: PHYSICIAN ASSISTANT

## 2021-01-22 PROCEDURE — 96372 THER/PROPH/DIAG INJ SC/IM: CPT | Performed by: PHYSICIAN ASSISTANT

## 2021-01-22 RX ORDER — METHYLPREDNISOLONE ACETATE 80 MG/ML
60 INJECTION, SUSPENSION INTRA-ARTICULAR; INTRALESIONAL; INTRAMUSCULAR; SOFT TISSUE ONCE
Status: COMPLETED | OUTPATIENT
Start: 2021-01-22 | End: 2021-01-22

## 2021-01-22 RX ORDER — PREDNISONE 20 MG/1
TABLET ORAL
Qty: 18 TABLET | Refills: 0 | Status: SHIPPED
Start: 2021-01-22 | End: 2021-02-04 | Stop reason: CLARIF

## 2021-01-22 RX ORDER — FAMOTIDINE 20 MG/1
20 TABLET, FILM COATED ORAL 2 TIMES DAILY
Qty: 28 TABLET | Refills: 0 | Status: SHIPPED
Start: 2021-01-22 | End: 2021-02-04

## 2021-01-22 RX ADMIN — METHYLPREDNISOLONE ACETATE 60 MG: 80 INJECTION, SUSPENSION INTRA-ARTICULAR; INTRALESIONAL; INTRAMUSCULAR; SOFT TISSUE at 08:42

## 2021-01-22 NOTE — PROGRESS NOTES
21  Gabby Palafox : 1965 Sex: female  Age 63 y.o. Subjective:  Chief Complaint   Patient presents with    Rash     rash on right leg         HPI:   Gabby Palafox , 63 y.o. female presents to express care for evaluation of rash. The patient has developed this rash to the right leg. Has been ongoing now for about a week and a half. The patient states not on 2021 she received the COVID-19 vaccine. The patient states that she was given a prescription for prednisone 40 mg once daily for 5 days. She took this for about 5 days and ended on Monday, 2021. The rash got a little bit better but did not seem to be progressing. Now the rash seems to be spreading from the right leg to the left leg and now to the upper chest.  The patient does not have any difficulty breathing. The patient denies any fevers or chills. The area to the arm where she received the injection is completely fine. The patient is not having any significant reaction to this area. ROS:   Unless otherwise stated in this report the patient's positive and negative responses for review of systems for constitutional, eyes, ENT, cardiovascular, respiratory, gastrointestinal, neurological, , musculoskeletal, and integument systems and related systems to the presenting problem are either stated in the history of present illness or were not pertinent or were negative for the symptoms and/or complaints related to the presenting medical problem. Positives and pertinent negatives as per HPI. All others reviewed and are negative. PMH:     Past Medical History:   Diagnosis Date    Depression     Gastritis     Hyperlipidemia        No past surgical history on file.     Family History   Problem Relation Age of Onset    Diabetes Father     Heart Disease Father        Medications:     Current Outpatient Medications:     predniSONE (DELTASONE) 20 MG tablet, 3 tablets once daily for 3 days, 2 tablets once daily for 3 days, one tablet once daily for 3 days, Disp: 18 tablet, Rfl: 0    famotidine (PEPCID) 20 MG tablet, Take 1 tablet by mouth 2 times daily for 14 days, Disp: 28 tablet, Rfl: 0    sucralfate (CARAFATE) 1 GM tablet, Prn, Disp: , Rfl:     rosuvastatin (CRESTOR) 5 MG tablet, Take 1 tablet by mouth daily, Disp: 90 tablet, Rfl: 3    pantoprazole (PROTONIX) 40 MG tablet, Take 1 tablet by mouth daily, Disp: 90 tablet, Rfl: 3    desvenlafaxine succinate (PRISTIQ) 50 MG TB24 extended release tablet, Take 1 tablet by mouth daily, Disp: 90 tablet, Rfl: 3    amitriptyline (ELAVIL) 25 MG tablet, Take 1 tablet by mouth nightly, Disp: 90 tablet, Rfl: 3    metFORMIN (GLUCOPHAGE-XR) 500 MG extended release tablet, Take 1 tablet by mouth daily (with breakfast), Disp: 90 tablet, Rfl: 3    Allergies: Allergies   Allergen Reactions    Iodine        Social History:     Social History     Tobacco Use    Smoking status: Never Smoker    Smokeless tobacco: Never Used   Substance Use Topics    Alcohol use: Not on file    Drug use: Not on file       Patient lives at home. Physical Exam:     Vitals:    01/22/21 0826   BP: 128/78   Pulse: 65   Resp: 18   Temp: 97.7 °F (36.5 °C)   SpO2: 97%   Weight: 228 lb (103.4 kg)   Height: 5' 3\" (1.6 m)       Exam:  Physical Exam  Vital Signs and nurse's notes are reviewed. The patient is not hypoxic. General: Alert, no acute distress, patient resting comfortably  Skin: warm, intact, no pallor noted. The patient has evidence of a maculopapular rash noted to the right leg and minimally to the left leg as well as to the left upper chest wall. the patient has no evidence of petechiae, purpura, or vesicles. The patient is no sloughing of the skin. Rash does timothy. The patient has no involvement of the palms, soles, or oral mucosa. The patient has no significant sloughing of the skin associated.   Head: Normocephalic, atraumatic  Eye: Normal conjunctiva  Ears, nose, throat: moist mucous membranes, there is no involvement of the oral mucosa, there is no lip or tongue swelling. The patient has airway patent. The patient has no tonsillar hypertrophy or swelling to the posterior oropharynx. Neck: The patient has no masses, warmth, or erythema. The patient has no meningeal signs. The patient has no nuchal rigidity noted. Cardiovascular: Regular Rate and Rhythm  Respiratory: No acute distress, no tachypnea, no evidence of rhonchi, wheezing, or rales noted in bilateral lung fields. No round noted. No retractions or stridor. Abdomen: Normal bowel sounds, soft, nontender, no rebound, guarding or rigidity noted. No masses detected. Musculoskeletal: No obvious deformity, normal range of motion  Neurological: Alert and oriented x4, normal speech      Testing:           Medical Decision Making:     Vital signs reviewed    Past medical history reviewed. Allergies reviewed. Medications reviewed. Patient on arrival does not appear to be in any apparent distress or discomfort. The patient does not appear to be toxic or lethargic. The patient does not appear to have any signs or symptoms of anaphylaxis or angioedema. The patient will be treated with intramuscular injection methylprednisone. We will start her on a higher dose of the prednisone and tapered over 9 days. The patient also be started on Pepcid. The patient is to continue with an antihistamine at home. The patient also was requesting that we add a H. pylori blood test to her blood work that she has to have done for her appointment on 2/4/2021. The patient states that she recently having a pet that was diagnosed with this and may be suffering from the same. She does have some reflux issues. The patient is not having any chest pain, shortness of breath, lightheadedness, dizziness. The patient is not in any respiratory distress. Patient will continue to monitor signs symptoms. Call with any questions or concerns.       Clinical Impression: Pippa Lo was seen today for rash. Diagnoses and all orders for this visit:    Rash and nonspecific skin eruption    Gastroesophageal reflux disease without esophagitis  -     H. Pylori Antibody, IgG; Future    Other orders  -     methylPREDNISolone acetate (DEPO-MEDROL) injection 60 mg  -     predniSONE (DELTASONE) 20 MG tablet; 3 tablets once daily for 3 days, 2 tablets once daily for 3 days, one tablet once daily for 3 days  -     famotidine (PEPCID) 20 MG tablet; Take 1 tablet by mouth 2 times daily for 14 days        The patient is to call for any concerns or return if any of the signs or symptoms worsen. The patient is to follow-up with PCP in the next 2-3 days for repeat evaluation repeat assessment or go directly to the emergency department.      SIGNATURE: Prisca Grimm III, PA-C

## 2021-02-01 DIAGNOSIS — E78.5 HYPERLIPIDEMIA, UNSPECIFIED HYPERLIPIDEMIA TYPE: ICD-10-CM

## 2021-02-01 DIAGNOSIS — I10 ESSENTIAL HYPERTENSION: ICD-10-CM

## 2021-02-01 DIAGNOSIS — R73.01 IMPAIRED FASTING GLUCOSE: ICD-10-CM

## 2021-02-04 ENCOUNTER — OFFICE VISIT (OUTPATIENT)
Dept: PRIMARY CARE CLINIC | Age: 56
End: 2021-02-04
Payer: COMMERCIAL

## 2021-02-04 VITALS
BODY MASS INDEX: 40.57 KG/M2 | WEIGHT: 229 LBS | OXYGEN SATURATION: 98 % | DIASTOLIC BLOOD PRESSURE: 86 MMHG | SYSTOLIC BLOOD PRESSURE: 134 MMHG | HEART RATE: 79 BPM | TEMPERATURE: 96.4 F

## 2021-02-04 DIAGNOSIS — I10 ESSENTIAL HYPERTENSION: Primary | ICD-10-CM

## 2021-02-04 DIAGNOSIS — F41.9 ANXIETY: ICD-10-CM

## 2021-02-04 DIAGNOSIS — M17.11 PRIMARY OSTEOARTHRITIS OF RIGHT KNEE: ICD-10-CM

## 2021-02-04 DIAGNOSIS — E78.5 HYPERLIPIDEMIA, UNSPECIFIED HYPERLIPIDEMIA TYPE: ICD-10-CM

## 2021-02-04 DIAGNOSIS — R73.01 IMPAIRED FASTING GLUCOSE: ICD-10-CM

## 2021-02-04 DIAGNOSIS — F32.A DEPRESSION, UNSPECIFIED DEPRESSION TYPE: ICD-10-CM

## 2021-02-04 DIAGNOSIS — K29.50 CHRONIC GASTRITIS WITHOUT BLEEDING, UNSPECIFIED GASTRITIS TYPE: ICD-10-CM

## 2021-02-04 DIAGNOSIS — K21.00 GASTROESOPHAGEAL REFLUX DISEASE WITH ESOPHAGITIS WITHOUT HEMORRHAGE: ICD-10-CM

## 2021-02-04 PROCEDURE — 99214 OFFICE O/P EST MOD 30 MIN: CPT | Performed by: FAMILY MEDICINE

## 2021-02-04 RX ORDER — AMITRIPTYLINE HYDROCHLORIDE 25 MG/1
25 TABLET, FILM COATED ORAL NIGHTLY
Qty: 90 TABLET | Refills: 3 | Status: SHIPPED
Start: 2021-02-04 | End: 2021-02-13 | Stop reason: SDUPTHER

## 2021-02-04 RX ORDER — PANTOPRAZOLE SODIUM 40 MG/1
40 TABLET, DELAYED RELEASE ORAL DAILY
Qty: 90 TABLET | Refills: 3 | Status: SHIPPED
Start: 2021-02-04 | End: 2021-02-13 | Stop reason: SDUPTHER

## 2021-02-04 RX ORDER — METFORMIN HYDROCHLORIDE 500 MG/1
500 TABLET, EXTENDED RELEASE ORAL
Qty: 90 TABLET | Refills: 3 | Status: SHIPPED
Start: 2021-02-04 | End: 2021-02-13 | Stop reason: SDUPTHER

## 2021-02-04 RX ORDER — DESVENLAFAXINE 50 MG/1
50 TABLET, EXTENDED RELEASE ORAL DAILY
Qty: 90 TABLET | Refills: 3 | Status: SHIPPED
Start: 2021-02-04 | End: 2021-02-04 | Stop reason: SDUPTHER

## 2021-02-04 RX ORDER — DESVENLAFAXINE 100 MG/1
TABLET, EXTENDED RELEASE ORAL
Qty: 90 TABLET | Refills: 1 | Status: SHIPPED | OUTPATIENT
Start: 2021-02-04 | End: 2021-02-13 | Stop reason: SDUPTHER

## 2021-02-04 RX ORDER — ROSUVASTATIN CALCIUM 5 MG/1
5 TABLET, COATED ORAL DAILY
Qty: 90 TABLET | Refills: 3 | Status: SHIPPED
Start: 2021-02-04 | End: 2021-02-13 | Stop reason: SDUPTHER

## 2021-02-04 ASSESSMENT — ENCOUNTER SYMPTOMS
RESPIRATORY NEGATIVE: 1
ALLERGIC/IMMUNOLOGIC NEGATIVE: 1
EYES NEGATIVE: 1
GASTROINTESTINAL NEGATIVE: 1

## 2021-02-04 NOTE — PROGRESS NOTES
21     Leon Naval    : 1965 Sex: female   Age: 63 y.o. Chief Complaint   Patient presents with    Discuss Labs    Gastroesophageal Reflux    Hyperlipidemia    Fatigue     feels more fuzzy lately       Prior to Admission medications    Medication Sig Start Date End Date Taking? Authorizing Provider   amitriptyline (ELAVIL) 25 MG tablet Take 1 tablet by mouth nightly 21  Yes Jose Manuel Cost Traikoff, DO   pantoprazole (PROTONIX) 40 MG tablet Take 1 tablet by mouth daily 21  Yes Jose Manuel Cost Traikoff, DO   metFORMIN (GLUCOPHAGE-XR) 500 MG extended release tablet Take 1 tablet by mouth daily (with breakfast) 21  Yes Lauro Morris, DO   rosuvastatin (CRESTOR) 5 MG tablet Take 1 tablet by mouth daily 21  Yes Jose Manuel Cost Traikoff, DO   desvenlafaxine succinate (PRISTIQ) 100 MG TB24 extended release tablet 1 qd 21  Yes Jose Manuel Cost Tayloroff, DO   sucralfate (CARAFATE) 1 GM tablet Prn 3/14/20   Historical Provider, MD          HPI: Regina Warren is seen this morning hypertension reflux disease impaired fasting glucose hyperlipidemia depression anxiety primary osteoarthritic disease of the knee. Recent MRI of the right knee confirms probable meniscal tear and will be following up with Dr. Radha Mustafa orthopedics. Increased problems with anxiety we did adjust Pristiq to 100 mg daily and reassess in a month. Medically otherwise has been stable and meds will continue as prescribed. Review of Systems   Constitutional: Negative. HENT: Negative. Eyes: Negative. Respiratory: Negative. Gastrointestinal: Negative. Endocrine: Negative. Genitourinary: Negative. Musculoskeletal: Positive for arthralgias and gait problem. Skin: Negative. Allergic/Immunologic: Negative. Hematological: Negative. Psychiatric/Behavioral: Negative.                Current Outpatient Medications:     amitriptyline (ELAVIL) 25 MG tablet, Take 1 tablet by mouth nightly, Disp: 90 tablet, Rfl: 3   pantoprazole (PROTONIX) 40 MG tablet, Take 1 tablet by mouth daily, Disp: 90 tablet, Rfl: 3    metFORMIN (GLUCOPHAGE-XR) 500 MG extended release tablet, Take 1 tablet by mouth daily (with breakfast), Disp: 90 tablet, Rfl: 3    rosuvastatin (CRESTOR) 5 MG tablet, Take 1 tablet by mouth daily, Disp: 90 tablet, Rfl: 3    desvenlafaxine succinate (PRISTIQ) 100 MG TB24 extended release tablet, 1 qd, Disp: 90 tablet, Rfl: 1    sucralfate (CARAFATE) 1 GM tablet, Prn, Disp: , Rfl:     Allergies   Allergen Reactions    Iodine        Social History     Tobacco Use    Smoking status: Never Smoker    Smokeless tobacco: Never Used   Substance Use Topics    Alcohol use: Not on file    Drug use: Not on file      No past surgical history on file. Family History   Problem Relation Age of Onset    Diabetes Father     Heart Disease Father      Past Medical History:   Diagnosis Date    Depression     Gastritis     Hyperlipidemia        Vitals:    02/04/21 1146   BP: 134/86   Pulse: 79   Temp: 96.4 °F (35.8 °C)   SpO2: 98%   Weight: 229 lb (103.9 kg)     BP Readings from Last 3 Encounters:   02/04/21 134/86   01/22/21 128/78   06/10/20 128/72        Physical Exam  Vitals signs and nursing note reviewed. Constitutional:       Appearance: She is well-developed. HENT:      Head: Normocephalic. Right Ear: External ear normal.      Left Ear: External ear normal.      Nose: Nose normal.   Eyes:      Conjunctiva/sclera: Conjunctivae normal.      Pupils: Pupils are equal, round, and reactive to light. Neck:      Musculoskeletal: Normal range of motion and neck supple. Cardiovascular:      Rate and Rhythm: Normal rate. Pulmonary:      Breath sounds: Normal breath sounds. Abdominal:      General: Bowel sounds are normal.      Palpations: Abdomen is soft. Musculoskeletal: Normal range of motion. Skin:     General: Skin is warm and dry.    Neurological:      Mental Status: She is alert and oriented to person, place, and time. Psychiatric:         Behavior: Behavior normal.        Vitals physical examination stable. Medications as prescribed. Reassessment with me 1 month and sooner if problems. Reviewed lab work and stable. A1c at 6.7 repeat blood work in 3 months. Plan Per Assessment:  Will Ramirez was seen today for discuss labs, gastroesophageal reflux, hyperlipidemia and fatigue. Diagnoses and all orders for this visit:    Essential hypertension  -     Comprehensive Metabolic Panel; Future  -     Hemoglobin A1C; Future  -     Lipid Panel; Future  -     T4; Future  -     TSH without Reflex; Future    Gastroesophageal reflux disease with esophagitis without hemorrhage    Impaired fasting glucose  -     Comprehensive Metabolic Panel; Future  -     Hemoglobin A1C; Future  -     Lipid Panel; Future  -     T4; Future  -     TSH without Reflex; Future    Hyperlipidemia, unspecified hyperlipidemia type  -     Comprehensive Metabolic Panel; Future  -     Hemoglobin A1C; Future  -     Lipid Panel; Future  -     T4; Future  -     TSH without Reflex; Future    Depression, unspecified depression type    Anxiety    Primary osteoarthritis of right knee    Other orders  -     Discontinue: desvenlafaxine succinate (PRISTIQ) 50 MG TB24 extended release tablet; Take 1 tablet by mouth daily  -     amitriptyline (ELAVIL) 25 MG tablet; Take 1 tablet by mouth nightly  -     pantoprazole (PROTONIX) 40 MG tablet; Take 1 tablet by mouth daily  -     metFORMIN (GLUCOPHAGE-XR) 500 MG extended release tablet; Take 1 tablet by mouth daily (with breakfast)  -     rosuvastatin (CRESTOR) 5 MG tablet; Take 1 tablet by mouth daily  -     desvenlafaxine succinate (PRISTIQ) 100 MG TB24 extended release tablet; 1 qd            Return in about 1 month (around 3/4/2021). Halima Butler DO    Note was generated with the assistance of voice recognition software. Document was reviewed however may contain grammatical errors.

## 2021-02-10 LAB — HELICOBACTER PYLORI IGG: NORMAL

## 2021-02-13 RX ORDER — DESVENLAFAXINE 100 MG/1
TABLET, EXTENDED RELEASE ORAL
Qty: 90 TABLET | Refills: 1 | Status: SHIPPED
Start: 2021-02-13 | End: 2021-08-02

## 2021-02-13 RX ORDER — METFORMIN HYDROCHLORIDE 500 MG/1
500 TABLET, EXTENDED RELEASE ORAL
Qty: 90 TABLET | Refills: 3 | Status: SHIPPED
Start: 2021-02-13 | End: 2021-12-03 | Stop reason: SDUPTHER

## 2021-02-13 RX ORDER — ROSUVASTATIN CALCIUM 5 MG/1
5 TABLET, COATED ORAL DAILY
Qty: 90 TABLET | Refills: 3 | Status: SHIPPED
Start: 2021-02-13 | End: 2022-01-13 | Stop reason: SDUPTHER

## 2021-02-13 RX ORDER — PANTOPRAZOLE SODIUM 40 MG/1
40 TABLET, DELAYED RELEASE ORAL DAILY
Qty: 90 TABLET | Refills: 3 | Status: SHIPPED
Start: 2021-02-13 | End: 2022-01-13 | Stop reason: SDUPTHER

## 2021-02-13 RX ORDER — AMITRIPTYLINE HYDROCHLORIDE 25 MG/1
25 TABLET, FILM COATED ORAL NIGHTLY
Qty: 90 TABLET | Refills: 3 | Status: SHIPPED
Start: 2021-02-13 | End: 2022-01-13 | Stop reason: SDUPTHER

## 2021-03-04 ENCOUNTER — OFFICE VISIT (OUTPATIENT)
Dept: PRIMARY CARE CLINIC | Age: 56
End: 2021-03-04
Payer: COMMERCIAL

## 2021-03-04 VITALS
TEMPERATURE: 96.5 F | SYSTOLIC BLOOD PRESSURE: 122 MMHG | OXYGEN SATURATION: 98 % | HEART RATE: 73 BPM | DIASTOLIC BLOOD PRESSURE: 84 MMHG

## 2021-03-04 DIAGNOSIS — F32.A DEPRESSION, UNSPECIFIED DEPRESSION TYPE: Primary | ICD-10-CM

## 2021-03-04 DIAGNOSIS — F41.9 ANXIETY: ICD-10-CM

## 2021-03-04 DIAGNOSIS — K21.9 GASTROESOPHAGEAL REFLUX DISEASE WITHOUT ESOPHAGITIS: ICD-10-CM

## 2021-03-04 DIAGNOSIS — I10 ESSENTIAL HYPERTENSION: ICD-10-CM

## 2021-03-04 DIAGNOSIS — E78.5 HYPERLIPIDEMIA, UNSPECIFIED HYPERLIPIDEMIA TYPE: ICD-10-CM

## 2021-03-04 PROCEDURE — 99214 OFFICE O/P EST MOD 30 MIN: CPT | Performed by: FAMILY MEDICINE

## 2021-03-04 RX ORDER — BUSPIRONE HYDROCHLORIDE 5 MG/1
5 TABLET ORAL 2 TIMES DAILY
Qty: 60 TABLET | Refills: 1 | Status: SHIPPED
Start: 2021-03-04 | End: 2021-04-06 | Stop reason: SDUPTHER

## 2021-03-04 ASSESSMENT — ENCOUNTER SYMPTOMS
EYES NEGATIVE: 1
ALLERGIC/IMMUNOLOGIC NEGATIVE: 1
RESPIRATORY NEGATIVE: 1
GASTROINTESTINAL NEGATIVE: 1

## 2021-03-04 NOTE — PROGRESS NOTES
3/4/21     Bennetta Buerger    : 1965 Sex: female   Age: 63 y.o. Chief Complaint   Patient presents with    Anxiety     not noticing much change since adjusting pristiq    Hyperlipidemia    Hypertension    Gastroesophageal Reflux       Prior to Admission medications    Medication Sig Start Date End Date Taking? Authorizing Provider   desvenlafaxine succinate (PRISTIQ) 100 MG TB24 extended release tablet 1 qd 21  Yes Myrna Oates,    rosuvastatin (CRESTOR) 5 MG tablet Take 1 tablet by mouth daily 21  Yes Myrna Oates, DO   metFORMIN (GLUCOPHAGE-XR) 500 MG extended release tablet Take 1 tablet by mouth daily (with breakfast) 21  Yes Myrna Oates DO   pantoprazole (PROTONIX) 40 MG tablet Take 1 tablet by mouth daily 21  Yes Myrna Oates DO   amitriptyline (ELAVIL) 25 MG tablet Take 1 tablet by mouth nightly 21  Yes Myrna Oates, DO   sucralfate (CARAFATE) 1 GM tablet Prn 3/14/20  Yes Historical Provider, MD          HPI: Patient evaluated today medical problems depression hyperlipidemia anxiety hypertension reflux disease. Adjusted her medications today with the addition of BuSpar 5 mg twice daily. Still problems with anxiety and depression. Pristiq will be maintained at 100 mg daily. Referral today for psychiatry evaluation. Reflux disease been persistent. Currently taking Protonix at 40 a day. May require GI referral but she prefers to hold off at this time. Readdress at next visit. Review of Systems   Constitutional: Negative. HENT: Negative. Eyes: Negative. Respiratory: Negative. Gastrointestinal: Negative. Remittent problems with GERD. Endocrine: Negative. Genitourinary: Negative. Musculoskeletal: Negative. Skin: Negative. Allergic/Immunologic: Negative. Neurological: Negative. Hematological: Negative. Psychiatric/Behavioral: Negative. Increased anxiety depression. Current Outpatient Medications:     desvenlafaxine succinate (PRISTIQ) 100 MG TB24 extended release tablet, 1 qd, Disp: 90 tablet, Rfl: 1    rosuvastatin (CRESTOR) 5 MG tablet, Take 1 tablet by mouth daily, Disp: 90 tablet, Rfl: 3    metFORMIN (GLUCOPHAGE-XR) 500 MG extended release tablet, Take 1 tablet by mouth daily (with breakfast), Disp: 90 tablet, Rfl: 3    pantoprazole (PROTONIX) 40 MG tablet, Take 1 tablet by mouth daily, Disp: 90 tablet, Rfl: 3    amitriptyline (ELAVIL) 25 MG tablet, Take 1 tablet by mouth nightly, Disp: 90 tablet, Rfl: 3    sucralfate (CARAFATE) 1 GM tablet, Prn, Disp: , Rfl:     Allergies   Allergen Reactions    Iodine        Social History     Tobacco Use    Smoking status: Never Smoker    Smokeless tobacco: Never Used   Substance Use Topics    Alcohol use: Not on file    Drug use: Not on file      No past surgical history on file. Family History   Problem Relation Age of Onset    Diabetes Father     Heart Disease Father      Past Medical History:   Diagnosis Date    Depression     Gastritis     Hyperlipidemia        Vitals:    03/04/21 0744   BP: 122/84   Pulse: 73   Temp: 96.5 °F (35.8 °C)   SpO2: 98%     BP Readings from Last 3 Encounters:   03/04/21 122/84   02/04/21 134/86   01/22/21 128/78        Physical Exam  Vitals signs and nursing note reviewed. Constitutional:       Appearance: She is well-developed. HENT:      Head: Normocephalic. Right Ear: External ear normal.      Left Ear: External ear normal.      Nose: Nose normal.   Eyes:      Conjunctiva/sclera: Conjunctivae normal.      Pupils: Pupils are equal, round, and reactive to light. Neck:      Musculoskeletal: Normal range of motion and neck supple. Cardiovascular:      Rate and Rhythm: Normal rate. Pulmonary:      Breath sounds: Normal breath sounds. Abdominal:      General: Bowel sounds are normal.      Palpations: Abdomen is soft. Musculoskeletal: Normal range of motion. Skin:     General: Skin is warm and dry. Neurological:      Mental Status: She is alert and oriented to person, place, and time. Psychiatric:         Behavior: Behavior normal.     Today's vitals physical examination stable. Medications as prescribed. Reassessment 1 month and sooner if problems. Referral to psychiatry today. Will discuss GI referral again at next visit.         Lab Results   Component Value Date    TSH 1.71 06/08/2020    TSH 0.729 09/20/2013    K9ZXMOB 94.2 09/20/2013    Y6XTVND 7.5 06/08/2020    B2DVFLN 8.0 09/20/2013     Lab Results   Component Value Date    CHOL 183 01/22/2021    CHOL 164 06/08/2020     Lab Results   Component Value Date    TRIG 208 01/22/2021    TRIG 203 06/08/2020     Lab Results   Component Value Date    HDL 39 01/22/2021    HDL 32 (A) 06/08/2020     No results found for: [facility]  Lab Results   Component Value Date    LABVLDL 29 10/21/2019    LABVLDL 19 01/19/2015     Lab Results   Component Value Date    CHOLHDLRATIO 5 01/22/2021    CHOLHDLRATIO 5 06/08/2020     Lab Results   Component Value Date    WBC 7.9 01/22/2021    HGB 14.4 01/22/2021    HCT 41.3 01/22/2021    MCV 91.4 01/22/2021     01/22/2021    LYMPHOPCT 17.4 01/22/2021    RBC 4.52 01/22/2021    MCH 31.9 01/22/2021    MCHC 34.9 01/22/2021    RDW 13.3 01/22/2021     Lab Results   Component Value Date     01/22/2021    K 4.7 01/22/2021     01/22/2021    CO2 31 01/22/2021    BUN 12 01/22/2021    CREATININE 1.06 01/22/2021    GLUCOSE 146 01/22/2021    CALCIUM 9.7 01/22/2021    PROT 7.8 10/21/2019    LABALBU 4.3 01/22/2021    BILITOT 0.7 01/22/2021    ALKPHOS 100 01/22/2021    AST 12 01/22/2021    ALT 43 01/22/2021    LABGLOM 59 01/22/2021    GFRAA >60 10/22/2019      No results found for: PSA, PSADIA   Lab Results   Component Value Date    LABA1C 6.7 01/22/2021    LABA1C 6.7 06/08/2020    LABA1C 6.1 (H) 10/21/2019     No results found for: EAG     Plan Per Assessment:  There are no diagnoses linked to this encounter. No follow-ups on file. Soren Hedrick DO    Note was generated with the assistance of voice recognition software. Document was reviewed however may contain grammatical errors.

## 2021-04-06 ENCOUNTER — OFFICE VISIT (OUTPATIENT)
Dept: PRIMARY CARE CLINIC | Age: 56
End: 2021-04-06
Payer: COMMERCIAL

## 2021-04-06 VITALS
TEMPERATURE: 97.1 F | DIASTOLIC BLOOD PRESSURE: 82 MMHG | WEIGHT: 229 LBS | HEART RATE: 68 BPM | SYSTOLIC BLOOD PRESSURE: 124 MMHG | HEIGHT: 63 IN | OXYGEN SATURATION: 98 % | BODY MASS INDEX: 40.57 KG/M2

## 2021-04-06 DIAGNOSIS — F32.A DEPRESSION, UNSPECIFIED DEPRESSION TYPE: ICD-10-CM

## 2021-04-06 DIAGNOSIS — R73.01 IMPAIRED FASTING GLUCOSE: ICD-10-CM

## 2021-04-06 DIAGNOSIS — F41.9 ANXIETY: ICD-10-CM

## 2021-04-06 DIAGNOSIS — E78.5 HYPERLIPIDEMIA, UNSPECIFIED HYPERLIPIDEMIA TYPE: ICD-10-CM

## 2021-04-06 DIAGNOSIS — I10 ESSENTIAL HYPERTENSION: Primary | ICD-10-CM

## 2021-04-06 PROCEDURE — 99214 OFFICE O/P EST MOD 30 MIN: CPT | Performed by: FAMILY MEDICINE

## 2021-04-06 RX ORDER — BUSPIRONE HYDROCHLORIDE 5 MG/1
5 TABLET ORAL 2 TIMES DAILY
Qty: 60 TABLET | Refills: 1 | Status: SHIPPED
Start: 2021-04-06 | End: 2021-05-21 | Stop reason: SDUPTHER

## 2021-04-06 ASSESSMENT — ENCOUNTER SYMPTOMS
GASTROINTESTINAL NEGATIVE: 1
ALLERGIC/IMMUNOLOGIC NEGATIVE: 1
RESPIRATORY NEGATIVE: 1
EYES NEGATIVE: 1

## 2021-04-06 NOTE — PROGRESS NOTES
21     Larry Mo    : 1965 Sex: female   Age: 63 y.o. Chief Complaint   Patient presents with    Depression     1 month       Prior to Admission medications    Medication Sig Start Date End Date Taking? Authorizing Provider   busPIRone (BUSPAR) 5 MG tablet Take 1 tablet by mouth 2 times daily 21 Yes Joelle Oates,    desvenlafaxine succinate (PRISTIQ) 100 MG TB24 extended release tablet 1 qd 21  Yes Joelle Oates DO   rosuvastatin (CRESTOR) 5 MG tablet Take 1 tablet by mouth daily 21  Yes Joelle Oates DO   metFORMIN (GLUCOPHAGE-XR) 500 MG extended release tablet Take 1 tablet by mouth daily (with breakfast) 21  Yes Joelle Oates DO   pantoprazole (PROTONIX) 40 MG tablet Take 1 tablet by mouth daily 21  Yes Joelle Oates DO   amitriptyline (ELAVIL) 25 MG tablet Take 1 tablet by mouth nightly 21  Yes Joelle Oates DO   sucralfate (CARAFATE) 1 GM tablet Prn 3/14/20  Yes Historical Provider, MD          HPI: Patient evaluated today with hypertension hyperlipidemia anxiety depression impaired fasting glucose. Overall medically stable. Anxiety persists but is counseling and continues with the BuSpar as prescribed. Maintain present dosing. Pristiq at 100 mg daily and will maintain. Admits to daughter who is currently in the process of sexual change and has created anxiety and depressed symptoms for her. Emotional support provided. Review of Systems   Constitutional: Negative. HENT: Negative. Eyes: Negative. Respiratory: Negative. Gastrointestinal: Negative. Endocrine: Negative. Genitourinary: Negative. Musculoskeletal: Negative. Skin: Negative. Allergic/Immunologic: Negative. Neurological: Negative. Hematological: Negative. Psychiatric/Behavioral: Negative.                Current Outpatient Medications:     busPIRone (BUSPAR) 5 MG tablet, Take 1 tablet by mouth 2 times daily, Disp: 60 Normal range of motion. Skin:     General: Skin is warm and dry. Neurological:      Mental Status: She is alert and oriented to person, place, and time. Psychiatric:         Behavior: Behavior normal.     Today's physical exam findings are all stable. Medications will continue as prescribed. Follow-up visit in 3 months. Continue with counseling. Lab studies prior to next visit. Plan Per Assessment:  Niurka Coleman was seen today for depression. Diagnoses and all orders for this visit:    Essential hypertension    Hyperlipidemia, unspecified hyperlipidemia type    Anxiety    Depression, unspecified depression type    Impaired fasting glucose    Other orders  -     busPIRone (BUSPAR) 5 MG tablet; Take 1 tablet by mouth 2 times daily            Return in about 3 months (around 7/6/2021). Zaria Villa DO    Note was generated with the assistance of voice recognition software. Document was reviewed however may contain grammatical errors.

## 2021-05-21 RX ORDER — BUSPIRONE HYDROCHLORIDE 5 MG/1
5 TABLET ORAL 2 TIMES DAILY
Qty: 180 TABLET | Refills: 1 | Status: SHIPPED
Start: 2021-05-21 | End: 2021-08-04 | Stop reason: SDUPTHER

## 2021-07-19 ENCOUNTER — OFFICE VISIT (OUTPATIENT)
Dept: FAMILY MEDICINE CLINIC | Age: 56
End: 2021-07-19
Payer: COMMERCIAL

## 2021-07-19 VITALS
BODY MASS INDEX: 40.04 KG/M2 | HEIGHT: 63 IN | TEMPERATURE: 96.9 F | DIASTOLIC BLOOD PRESSURE: 96 MMHG | OXYGEN SATURATION: 97 % | WEIGHT: 226 LBS | SYSTOLIC BLOOD PRESSURE: 156 MMHG | HEART RATE: 71 BPM

## 2021-07-19 DIAGNOSIS — K21.00 GASTROESOPHAGEAL REFLUX DISEASE WITH ESOPHAGITIS WITHOUT HEMORRHAGE: ICD-10-CM

## 2021-07-19 DIAGNOSIS — F41.9 ANXIETY: ICD-10-CM

## 2021-07-19 DIAGNOSIS — F32.A DEPRESSION, UNSPECIFIED DEPRESSION TYPE: ICD-10-CM

## 2021-07-19 DIAGNOSIS — R73.01 IMPAIRED FASTING GLUCOSE: ICD-10-CM

## 2021-07-19 DIAGNOSIS — E78.5 HYPERLIPIDEMIA, UNSPECIFIED HYPERLIPIDEMIA TYPE: ICD-10-CM

## 2021-07-19 DIAGNOSIS — I10 ESSENTIAL HYPERTENSION: ICD-10-CM

## 2021-07-19 DIAGNOSIS — I10 ESSENTIAL HYPERTENSION: Primary | ICD-10-CM

## 2021-07-19 LAB
ALBUMIN SERPL-MCNC: 4.5 G/DL (ref 3.5–5.2)
ALP BLD-CCNC: 90 U/L (ref 35–104)
ALT SERPL-CCNC: 34 U/L (ref 0–32)
ANION GAP SERPL CALCULATED.3IONS-SCNC: 17 MMOL/L (ref 7–16)
AST SERPL-CCNC: 27 U/L (ref 0–31)
BILIRUB SERPL-MCNC: 0.5 MG/DL (ref 0–1.2)
BUN BLDV-MCNC: 15 MG/DL (ref 6–20)
CALCIUM SERPL-MCNC: 9.2 MG/DL (ref 8.6–10.2)
CHLORIDE BLD-SCNC: 104 MMOL/L (ref 98–107)
CHOLESTEROL, TOTAL: 152 MG/DL (ref 0–199)
CO2: 21 MMOL/L (ref 22–29)
CREAT SERPL-MCNC: 1 MG/DL (ref 0.5–1)
GFR AFRICAN AMERICAN: >60
GFR NON-AFRICAN AMERICAN: 57 ML/MIN/1.73
GLUCOSE BLD-MCNC: 172 MG/DL (ref 74–99)
HBA1C MFR BLD: 6.9 % (ref 4–5.6)
HDLC SERPL-MCNC: 31 MG/DL
LDL CHOLESTEROL CALCULATED: 87 MG/DL (ref 0–99)
POTASSIUM SERPL-SCNC: 4.7 MMOL/L (ref 3.5–5)
SODIUM BLD-SCNC: 142 MMOL/L (ref 132–146)
T4 TOTAL: 6.7 MCG/DL (ref 4.5–11.7)
TOTAL PROTEIN: 7.3 G/DL (ref 6.4–8.3)
TRIGL SERPL-MCNC: 168 MG/DL (ref 0–149)
TSH SERPL DL<=0.05 MIU/L-ACNC: 1.44 UIU/ML (ref 0.27–4.2)
VLDLC SERPL CALC-MCNC: 34 MG/DL

## 2021-07-19 PROCEDURE — 99214 OFFICE O/P EST MOD 30 MIN: CPT | Performed by: FAMILY MEDICINE

## 2021-07-19 NOTE — PROGRESS NOTES
21     Sydney Billings    : 1965 Sex: female   Age: 63 y.o. Chief Complaint   Patient presents with    Hypertension     has been elevated, realzed they were elevated last week, headaches , has been running in the 160/100 range       Prior to Admission medications    Medication Sig Start Date End Date Taking? Authorizing Provider   busPIRone (BUSPAR) 5 MG tablet Take 1 tablet by mouth 2 times daily 21  Yes Adrian Oates, DO   desvenlafaxine succinate (PRISTIQ) 100 MG TB24 extended release tablet 1 qd 21  Yes Adrian Oates DO   rosuvastatin (CRESTOR) 5 MG tablet Take 1 tablet by mouth daily 21  Yes Adrian Oates, DO   metFORMIN (GLUCOPHAGE-XR) 500 MG extended release tablet Take 1 tablet by mouth daily (with breakfast) 21  Yes Adrian Oates DO   pantoprazole (PROTONIX) 40 MG tablet Take 1 tablet by mouth daily 21  Yes Adrian Oates, DO   amitriptyline (ELAVIL) 25 MG tablet Take 1 tablet by mouth nightly 21  Yes Adrian Oates, DO   sucralfate (CARAFATE) 1 GM tablet Prn 3/14/20  Yes Historical Provider, MD          HPI: Katlyn Kim is seen this morning in follow-up on concerns for labile blood pressure. Medically overall has been stable. Hypertension anxiety reflux disease hyperlipidemia depression. Medications will be continued as prescribed. Concerns for blood pressure but just minimal elevation. Abscessed tooth last week that was pulled and I believe she is doing somewhat better. We will follow-up blood pressure again in the next couple weeks and continue to monitor at home for now. Medication will be discussed again at that time. For now diet and exercise. Review of Systems   Constitutional: Negative. HENT: Negative. Eyes: Negative. Respiratory: Negative. Gastrointestinal: Negative. Endocrine: Negative. Genitourinary: Negative. Musculoskeletal: Negative. Skin: Negative. Allergic/Immunologic: Negative. Neurological: Negative. Hematological: Negative. Psychiatric/Behavioral: Negative. Today systems review stable meds as prescribed. Current Outpatient Medications:     busPIRone (BUSPAR) 5 MG tablet, Take 1 tablet by mouth 2 times daily, Disp: 180 tablet, Rfl: 1    desvenlafaxine succinate (PRISTIQ) 100 MG TB24 extended release tablet, 1 qd, Disp: 90 tablet, Rfl: 1    rosuvastatin (CRESTOR) 5 MG tablet, Take 1 tablet by mouth daily, Disp: 90 tablet, Rfl: 3    metFORMIN (GLUCOPHAGE-XR) 500 MG extended release tablet, Take 1 tablet by mouth daily (with breakfast), Disp: 90 tablet, Rfl: 3    pantoprazole (PROTONIX) 40 MG tablet, Take 1 tablet by mouth daily, Disp: 90 tablet, Rfl: 3    amitriptyline (ELAVIL) 25 MG tablet, Take 1 tablet by mouth nightly, Disp: 90 tablet, Rfl: 3    sucralfate (CARAFATE) 1 GM tablet, Prn, Disp: , Rfl:     Allergies   Allergen Reactions    Iodine        Social History     Tobacco Use    Smoking status: Never Smoker    Smokeless tobacco: Never Used   Substance Use Topics    Alcohol use: Not on file    Drug use: Not on file      No past surgical history on file. Family History   Problem Relation Age of Onset    Diabetes Father     Heart Disease Father      Past Medical History:   Diagnosis Date    Depression     Gastritis     Hyperlipidemia        Vitals:    07/19/21 0811   BP: (!) 156/96   Pulse: 71   Temp: 96.9 °F (36.1 °C)   SpO2: 97%   Weight: 226 lb (102.5 kg)   Height: 5' 3\" (1.6 m)     BP Readings from Last 3 Encounters:   07/19/21 (!) 156/96   04/06/21 124/82   03/04/21 122/84    140/92    Physical Exam  Vitals and nursing note reviewed. Constitutional:       Appearance: She is well-developed. HENT:      Head: Normocephalic. Right Ear: External ear normal.      Left Ear: External ear normal.      Nose: Nose normal.   Eyes:      Conjunctiva/sclera: Conjunctivae normal.      Pupils: Pupils are equal, round, and reactive to light. Cardiovascular:      Rate and Rhythm: Normal rate. Pulmonary:      Breath sounds: Normal breath sounds. Abdominal:      General: Bowel sounds are normal.      Palpations: Abdomen is soft. Musculoskeletal:         General: Normal range of motion. Cervical back: Normal range of motion and neck supple. Skin:     General: Skin is warm and dry. Neurological:      Mental Status: She is alert and oriented to person, place, and time. Psychiatric:         Behavior: Behavior normal.     Present vitals physical examination stable. I will maintain current meds and care. Follow-up visit 2 weeks and blood work just prior. For now diet and exercise proper rest.          Plan Per Assessment:  Mike Triana was seen today for hypertension. Diagnoses and all orders for this visit:    Essential hypertension    Anxiety    Gastroesophageal reflux disease with esophagitis without hemorrhage    Hyperlipidemia, unspecified hyperlipidemia type    Depression, unspecified depression type            No follow-ups on file. Les Dibbles, DO    Note was generated with the assistance of voice recognition software. Document was reviewed however may contain grammatical errors.

## 2021-08-02 RX ORDER — DESVENLAFAXINE 100 MG/1
TABLET, EXTENDED RELEASE ORAL
Qty: 90 TABLET | Refills: 3 | Status: SHIPPED
Start: 2021-08-02 | End: 2022-02-23 | Stop reason: SDUPTHER

## 2021-08-04 ENCOUNTER — OFFICE VISIT (OUTPATIENT)
Dept: PRIMARY CARE CLINIC | Age: 56
End: 2021-08-04
Payer: COMMERCIAL

## 2021-08-04 VITALS
DIASTOLIC BLOOD PRESSURE: 80 MMHG | TEMPERATURE: 97.7 F | BODY MASS INDEX: 40.03 KG/M2 | OXYGEN SATURATION: 98 % | HEIGHT: 63 IN | HEART RATE: 87 BPM | SYSTOLIC BLOOD PRESSURE: 130 MMHG

## 2021-08-04 DIAGNOSIS — R73.01 IMPAIRED FASTING GLUCOSE: ICD-10-CM

## 2021-08-04 DIAGNOSIS — F32.A DEPRESSION, UNSPECIFIED DEPRESSION TYPE: ICD-10-CM

## 2021-08-04 DIAGNOSIS — K21.00 GASTROESOPHAGEAL REFLUX DISEASE WITH ESOPHAGITIS WITHOUT HEMORRHAGE: ICD-10-CM

## 2021-08-04 DIAGNOSIS — I10 ESSENTIAL HYPERTENSION: Primary | ICD-10-CM

## 2021-08-04 DIAGNOSIS — F41.9 ANXIETY: ICD-10-CM

## 2021-08-04 DIAGNOSIS — Z12.31 ENCOUNTER FOR SCREENING MAMMOGRAM FOR MALIGNANT NEOPLASM OF BREAST: ICD-10-CM

## 2021-08-04 DIAGNOSIS — E78.5 HYPERLIPIDEMIA, UNSPECIFIED HYPERLIPIDEMIA TYPE: ICD-10-CM

## 2021-08-04 PROCEDURE — 99214 OFFICE O/P EST MOD 30 MIN: CPT | Performed by: FAMILY MEDICINE

## 2021-08-04 RX ORDER — BUSPIRONE HYDROCHLORIDE 5 MG/1
5 TABLET ORAL 2 TIMES DAILY
Qty: 180 TABLET | Refills: 1 | Status: SHIPPED
Start: 2021-08-04 | End: 2022-01-13 | Stop reason: SDUPTHER

## 2021-08-04 ASSESSMENT — ENCOUNTER SYMPTOMS
EYES NEGATIVE: 1
GASTROINTESTINAL NEGATIVE: 1
ALLERGIC/IMMUNOLOGIC NEGATIVE: 1
RESPIRATORY NEGATIVE: 1

## 2021-08-04 NOTE — PROGRESS NOTES
21     Josselyn Green    : 1965 Sex: female   Age: 63 y.o. Chief Complaint   Patient presents with   [address] Hypertension     still having high readings       Prior to Admission medications    Medication Sig Start Date End Date Taking? Authorizing Provider   busPIRone (BUSPAR) 5 MG tablet Take 1 tablet by mouth 2 times daily 21  Yes Nikko Erm Traikoff, DO   desvenlafaxine succinate (PRISTIQ) 100 MG TB24 extended release tablet TAKE 1 TABLET DAILY 21  Yes Nikko Erm Traikoff, DO   rosuvastatin (CRESTOR) 5 MG tablet Take 1 tablet by mouth daily 21  Yes Nikko Erm Traikoff, DO   metFORMIN (GLUCOPHAGE-XR) 500 MG extended release tablet Take 1 tablet by mouth daily (with breakfast) 21  Yes Nikko Erm Traikoff, DO   pantoprazole (PROTONIX) 40 MG tablet Take 1 tablet by mouth daily 21  Yes Nikko Hirsch Traikoff, DO   amitriptyline (ELAVIL) 25 MG tablet Take 1 tablet by mouth nightly 21  Yes Nikko Erm Traikoff, DO   sucralfate (CARAFATE) 1 GM tablet Prn 3/14/20  Yes Historical Provider, MD          HPI: Antonietta Stevens is seen today in medical follow-up hypertension hyperlipidemia depression anxiety reflux disease impaired fasting glucose. Medically doing very well medications well-tolerated. Health maintenance due for mammogram and prescription provided. Review of Systems   Constitutional: Negative. HENT: Negative. Eyes: Negative. Respiratory: Negative. Gastrointestinal: Negative. Endocrine: Negative. Genitourinary: Negative. Musculoskeletal: Negative. Skin: Negative. Allergic/Immunologic: Negative. Neurological: Negative. Hematological: Negative. Psychiatric/Behavioral: Negative.                Current Outpatient Medications:     busPIRone (BUSPAR) 5 MG tablet, Take 1 tablet by mouth 2 times daily, Disp: 180 tablet, Rfl: 1    desvenlafaxine succinate (PRISTIQ) 100 MG TB24 extended release tablet, TAKE 1 TABLET DAILY, Disp: 90 tablet, Rfl: 3   rosuvastatin (CRESTOR) 5 MG tablet, Take 1 tablet by mouth daily, Disp: 90 tablet, Rfl: 3    metFORMIN (GLUCOPHAGE-XR) 500 MG extended release tablet, Take 1 tablet by mouth daily (with breakfast), Disp: 90 tablet, Rfl: 3    pantoprazole (PROTONIX) 40 MG tablet, Take 1 tablet by mouth daily, Disp: 90 tablet, Rfl: 3    amitriptyline (ELAVIL) 25 MG tablet, Take 1 tablet by mouth nightly, Disp: 90 tablet, Rfl: 3    sucralfate (CARAFATE) 1 GM tablet, Prn, Disp: , Rfl:     Allergies   Allergen Reactions    Iodine        Social History     Tobacco Use    Smoking status: Never Smoker    Smokeless tobacco: Never Used   Substance Use Topics    Alcohol use: Not on file    Drug use: Not on file      No past surgical history on file. Family History   Problem Relation Age of Onset    Diabetes Father     Heart Disease Father      Past Medical History:   Diagnosis Date    Depression     Gastritis     Hyperlipidemia        Vitals:    08/04/21 1143 08/04/21 1146   BP: 132/80 130/80   Pulse: 87    Temp: 97.7 °F (36.5 °C)    SpO2: 98%    Height: 5' 3\" (1.6 m)      BP Readings from Last 3 Encounters:   08/04/21 130/80   07/19/21 (!) 156/96   04/06/21 124/82      118/80  Physical Exam  Vitals and nursing note reviewed. Constitutional:       Appearance: She is well-developed. HENT:      Head: Normocephalic. Right Ear: External ear normal.      Left Ear: External ear normal.      Nose: Nose normal.   Eyes:      Conjunctiva/sclera: Conjunctivae normal.      Pupils: Pupils are equal, round, and reactive to light. Cardiovascular:      Rate and Rhythm: Normal rate. Pulmonary:      Breath sounds: Normal breath sounds. Abdominal:      General: Bowel sounds are normal.      Palpations: Abdomen is soft. Musculoskeletal:         General: Normal range of motion. Cervical back: Normal range of motion and neck supple. Skin:     General: Skin is warm and dry.    Neurological:      Mental Status: She is alert and oriented to person, place, and time. Psychiatric:         Behavior: Behavior normal.     Present vitals physical examination all stable. We will maintain current meds and care. Labs reviewed and stable. Follow-up visit 3 months sooner if problems. Lab Results   Component Value Date    TSH 1.440 07/19/2021    TSH 1.71 06/08/2020    G2EOMCU 94.2 09/20/2013    V8IDMLL 6.7 07/19/2021    T8YJFSW 7.5 06/08/2020     Lab Results   Component Value Date    CHOL 152 07/19/2021    CHOL 183 01/22/2021     Lab Results   Component Value Date    TRIG 168 (H) 07/19/2021    TRIG 208 01/22/2021     Lab Results   Component Value Date    HDL 31 07/19/2021    HDL 39 01/22/2021     No results found for: [facility]  Lab Results   Component Value Date    LABVLDL 34 07/19/2021    LABVLDL 29 10/21/2019     Lab Results   Component Value Date    CHOLHDLRATIO 5 01/22/2021    CHOLHDLRATIO 5 06/08/2020     Lab Results   Component Value Date    WBC 7.9 01/22/2021    HGB 14.4 01/22/2021    HCT 41.3 01/22/2021    MCV 91.4 01/22/2021     01/22/2021    LYMPHOPCT 17.4 01/22/2021    RBC 4.52 01/22/2021    MCH 31.9 01/22/2021    MCHC 34.9 01/22/2021    RDW 13.3 01/22/2021     Lab Results   Component Value Date     07/19/2021    K 4.7 07/19/2021     07/19/2021    CO2 21 (L) 07/19/2021    BUN 15 07/19/2021    CREATININE 1.0 07/19/2021    GLUCOSE 172 (H) 07/19/2021    CALCIUM 9.2 07/19/2021    PROT 7.3 07/19/2021    LABALBU 4.5 07/19/2021    BILITOT 0.5 07/19/2021    ALKPHOS 90 07/19/2021    AST 27 07/19/2021    ALT 34 (H) 07/19/2021    LABGLOM 57 07/19/2021    GFRAA >60 07/19/2021      No results found for: PSA, PSADIA   Lab Results   Component Value Date    LABA1C 6.9 (H) 07/19/2021    LABA1C 6.7 01/22/2021    LABA1C 6.7 06/08/2020     No results found for: EAG           Plan Per Assessment:  Daniel Song was seen today for discuss labs and hypertension.     Diagnoses and all orders for this visit:    Encounter for screening mammogram for malignant neoplasm of breast  -     HALLIE DIGITAL SCREEN BILATERAL PER PROTOCOL; Future    Other orders  -     busPIRone (BUSPAR) 5 MG tablet; Take 1 tablet by mouth 2 times daily            No follow-ups on file. Zetta Dress, DO    Note was generated with the assistance of voice recognition software. Document was reviewed however may contain grammatical errors.

## 2021-08-10 ENCOUNTER — HOSPITAL ENCOUNTER (OUTPATIENT)
Dept: SLEEP CENTER | Age: 56
Discharge: HOME | End: 2021-08-10
Payer: COMMERCIAL

## 2021-08-10 DIAGNOSIS — K21.00 GASTROESOPHAGEAL REFLUX DISEASE WITH ESOPHAGITIS WITHOUT HEMORRHAGE: Primary | ICD-10-CM

## 2021-08-10 DIAGNOSIS — I10 ESSENTIAL HYPERTENSION: ICD-10-CM

## 2021-08-10 DIAGNOSIS — G47.33 OSA (OBSTRUCTIVE SLEEP APNEA): ICD-10-CM

## 2021-08-10 DIAGNOSIS — F41.9 ANXIETY: ICD-10-CM

## 2021-08-10 PROCEDURE — G0399 HOME SLEEP TEST/TYPE 3 PORTA: HCPCS

## 2021-08-18 NOTE — PROGRESS NOTES
[id number] [address]                               SLEEP STUDY REPORT    PATIENT NAME: Verónica Soni                        :        1965  MED REC NO:   [id number]                            ROOM:  ACCOUNT NO:   [de-identified]                           ADMIT DATE: 08/10/2021  PROVIDER:     Trinidad Hogan MD    DATE OF STUDY:  08/10/2021    INDICATION FOR STUDY:  This is sleep study for evaluation for TALITA. The  patient with history of daytime sleepiness with Schroeder score of 16/24  and history of obesity with BMI of 40. This test was done with the  recording of respiratory events, oxygen saturation, snoring, and heart  rate. Duration of the recording was 7 hours and 3 minutes. RESPIRATORY EVENTS:  This patient had 18 episodes of apneas, all them  obstructive in nature and 53 episodes of hypopneas. The combined  apnea/hypopnea index was 10.5. Snoring was noted to be moderate to severe. OXYGENATION:  Average saturation was 93%. The lowest saturation was  78%. The patient spent 38 minutes with saturation equal or less than  88%. The oxygen saturation index was 9. HEART RATE:  Fluctuated between 55 and 95 beats per minute with the  average pulse of 74 beats per minute. In summary, this patient presented with the followin. Moderate to loud snoring. 2.  Mild obstructive sleep apnea. 3.  Nocturnal hypoxemia, most likely associated to sleep apnea. Considering the above findings associated with excessive daytime  sleepiness, the patient will benefit from intervention. Weight loss  should be encouraged.         Linda Stauffer MD    D: 2021 10:34:03       T: 2021 16:40:16     [id number]  Job#: [id number]     Doc#: [id number]

## 2021-09-03 ENCOUNTER — TELEPHONE (OUTPATIENT)
Dept: SLEEP CENTER | Age: 56
End: 2021-09-03

## 2021-09-08 ENCOUNTER — TELEPHONE (OUTPATIENT)
Dept: SLEEP CENTER | Age: 56
End: 2021-09-08

## 2021-10-27 ENCOUNTER — TELEPHONE (OUTPATIENT)
Dept: SLEEP CENTER | Age: 56
End: 2021-10-27

## 2021-10-28 ENCOUNTER — HOSPITAL ENCOUNTER (OUTPATIENT)
Dept: SLEEP CENTER | Age: 56
Discharge: HOME | End: 2021-10-28
Payer: COMMERCIAL

## 2021-10-28 DIAGNOSIS — G47.33 OSA (OBSTRUCTIVE SLEEP APNEA): Primary | ICD-10-CM

## 2021-10-28 PROCEDURE — 95811 POLYSOM 6/>YRS CPAP 4/> PARM: CPT

## 2021-11-03 NOTE — PROGRESS NOTES
between 57 to 97 beats per minute with average  heart rate of 70 beats per minute. TITRATION ANALYSIS:  The patient was started on CPAP of 5 cm water  pressure and titrated all the way up to 13 cm of water pressure, tried  to abolish all the respiratory events and snoring with   CPAP of 13, happened after the patient switched position from lateral position to supine position. At that time, increased number of events were noticed especially  emergent centrals. With a total recording time of 25 minutes, NON-  REM sleep and the patient was unable to reach REM sleep during this last  titration. At that time with increased emergent central, the  apnea-hypopnea index increased to 17. Saturation was appropriate with a  mean saturation of 92%. The A/H index of 2.8 while the patient was  in lateral position. Considering the above findings, the patient  responded  fair to the use of CPAP using as an interface a Resp[employer]ics DreamWear  nasal mask, small frame, small cushion. Titration considered to be  suboptimal due to the presence of emergent central at the end of the  study when the patient turned herself into supine position. I think  this patient will need some fine tuning in this regard and the patient  may benefit with AUTO-titration at least for one or two weeks and  reevaluate her pressure needs.         Ronni Gutierres MD    D: 11/02/2021 21:41:03       T: 11/03/2021 5:53:24     [email or web address]  Job#: [id number]     Doc#: [id number]    CC:

## 2021-11-18 ENCOUNTER — OFFICE VISIT (OUTPATIENT)
Dept: FAMILY MEDICINE CLINIC | Age: 56
End: 2021-11-18
Payer: COMMERCIAL

## 2021-11-18 VITALS
DIASTOLIC BLOOD PRESSURE: 74 MMHG | BODY MASS INDEX: 40.57 KG/M2 | HEART RATE: 94 BPM | WEIGHT: 229 LBS | OXYGEN SATURATION: 99 % | TEMPERATURE: 97.1 F | SYSTOLIC BLOOD PRESSURE: 128 MMHG

## 2021-11-18 DIAGNOSIS — R30.0 DYSURIA: ICD-10-CM

## 2021-11-18 DIAGNOSIS — R30.0 DYSURIA: Primary | ICD-10-CM

## 2021-11-18 DIAGNOSIS — R73.9 HYPERGLYCEMIA: ICD-10-CM

## 2021-11-18 DIAGNOSIS — R81 GLUCOSURIA: ICD-10-CM

## 2021-11-18 LAB
BILIRUBIN, POC: NORMAL
BLOOD URINE, POC: NORMAL
CHP ED QC CHECK: NORMAL
CLARITY, POC: CLEAR
COLOR, POC: YELLOW
GLUCOSE BLD-MCNC: 312 MG/DL
GLUCOSE URINE, POC: NORMAL
KETONES, POC: NORMAL
LEUKOCYTE EST, POC: NORMAL
NITRITE, POC: NORMAL
PH, POC: 6
PROTEIN, POC: NORMAL
SPECIFIC GRAVITY, POC: 1.02
UROBILINOGEN, POC: NORMAL

## 2021-11-18 PROCEDURE — 82962 GLUCOSE BLOOD TEST: CPT | Performed by: PHYSICIAN ASSISTANT

## 2021-11-18 PROCEDURE — 81002 URINALYSIS NONAUTO W/O SCOPE: CPT | Performed by: PHYSICIAN ASSISTANT

## 2021-11-18 PROCEDURE — 99214 OFFICE O/P EST MOD 30 MIN: CPT | Performed by: PHYSICIAN ASSISTANT

## 2021-11-20 LAB
ORGANISM: ABNORMAL
URINE CULTURE, ROUTINE: ABNORMAL

## 2021-11-22 RX ORDER — FLUCONAZOLE 150 MG/1
150 TABLET ORAL
Qty: 2 TABLET | Refills: 0 | Status: SHIPPED | OUTPATIENT
Start: 2021-11-22 | End: 2021-11-28

## 2021-11-30 DIAGNOSIS — I10 ESSENTIAL HYPERTENSION: ICD-10-CM

## 2021-11-30 DIAGNOSIS — R73.01 IMPAIRED FASTING GLUCOSE: ICD-10-CM

## 2021-11-30 DIAGNOSIS — F32.A DEPRESSION, UNSPECIFIED DEPRESSION TYPE: ICD-10-CM

## 2021-11-30 DIAGNOSIS — E78.5 HYPERLIPIDEMIA, UNSPECIFIED HYPERLIPIDEMIA TYPE: ICD-10-CM

## 2021-11-30 LAB
ALBUMIN SERPL-MCNC: 4.3 G/DL (ref 3.5–5.2)
ALP BLD-CCNC: 106 U/L (ref 35–104)
ALT SERPL-CCNC: 38 U/L (ref 0–32)
ANION GAP SERPL CALCULATED.3IONS-SCNC: 12 MMOL/L (ref 7–16)
AST SERPL-CCNC: 30 U/L (ref 0–31)
BASOPHILS ABSOLUTE: 0.04 E9/L (ref 0–0.2)
BASOPHILS RELATIVE PERCENT: 0.8 % (ref 0–2)
BILIRUB SERPL-MCNC: 0.6 MG/DL (ref 0–1.2)
BUN BLDV-MCNC: 13 MG/DL (ref 6–20)
CALCIUM SERPL-MCNC: 9 MG/DL (ref 8.6–10.2)
CHLORIDE BLD-SCNC: 101 MMOL/L (ref 98–107)
CHOLESTEROL, TOTAL: 178 MG/DL (ref 0–199)
CO2: 24 MMOL/L (ref 22–29)
CREAT SERPL-MCNC: 1 MG/DL (ref 0.5–1)
EOSINOPHILS ABSOLUTE: 0.29 E9/L (ref 0.05–0.5)
EOSINOPHILS RELATIVE PERCENT: 5.6 % (ref 0–6)
GFR AFRICAN AMERICAN: >60
GFR NON-AFRICAN AMERICAN: 57 ML/MIN/1.73
GLUCOSE BLD-MCNC: 230 MG/DL (ref 74–99)
HBA1C MFR BLD: 9.5 % (ref 4–5.6)
HCT VFR BLD CALC: 41.8 % (ref 34–48)
HDLC SERPL-MCNC: 35 MG/DL
HEMOGLOBIN: 14 G/DL (ref 11.5–15.5)
IMMATURE GRANULOCYTES #: 0.02 E9/L
IMMATURE GRANULOCYTES %: 0.4 % (ref 0–5)
LDL CHOLESTEROL CALCULATED: 110 MG/DL (ref 0–99)
LYMPHOCYTES ABSOLUTE: 1.11 E9/L (ref 1.5–4)
LYMPHOCYTES RELATIVE PERCENT: 21.3 % (ref 20–42)
MCH RBC QN AUTO: 31.3 PG (ref 26–35)
MCHC RBC AUTO-ENTMCNC: 33.5 % (ref 32–34.5)
MCV RBC AUTO: 93.3 FL (ref 80–99.9)
MONOCYTES ABSOLUTE: 0.71 E9/L (ref 0.1–0.95)
MONOCYTES RELATIVE PERCENT: 13.6 % (ref 2–12)
NEUTROPHILS ABSOLUTE: 3.04 E9/L (ref 1.8–7.3)
NEUTROPHILS RELATIVE PERCENT: 58.3 % (ref 43–80)
PDW BLD-RTO: 13.4 FL (ref 11.5–15)
PLATELET # BLD: 365 E9/L (ref 130–450)
PMV BLD AUTO: 9.5 FL (ref 7–12)
POTASSIUM SERPL-SCNC: 4.4 MMOL/L (ref 3.5–5)
RBC # BLD: 4.48 E12/L (ref 3.5–5.5)
SODIUM BLD-SCNC: 137 MMOL/L (ref 132–146)
T4 TOTAL: 5.6 MCG/DL (ref 4.5–11.7)
TOTAL PROTEIN: 7 G/DL (ref 6.4–8.3)
TRIGL SERPL-MCNC: 166 MG/DL (ref 0–149)
TSH SERPL DL<=0.05 MIU/L-ACNC: 1.51 UIU/ML (ref 0.27–4.2)
VLDLC SERPL CALC-MCNC: 33 MG/DL
WBC # BLD: 5.2 E9/L (ref 4.5–11.5)

## 2021-12-03 ENCOUNTER — OFFICE VISIT (OUTPATIENT)
Dept: PRIMARY CARE CLINIC | Age: 56
End: 2021-12-03
Payer: COMMERCIAL

## 2021-12-03 VITALS
HEART RATE: 67 BPM | SYSTOLIC BLOOD PRESSURE: 122 MMHG | WEIGHT: 226 LBS | OXYGEN SATURATION: 98 % | TEMPERATURE: 98 F | DIASTOLIC BLOOD PRESSURE: 84 MMHG | BODY MASS INDEX: 40.03 KG/M2

## 2021-12-03 DIAGNOSIS — F41.9 ANXIETY: ICD-10-CM

## 2021-12-03 DIAGNOSIS — K21.00 GASTROESOPHAGEAL REFLUX DISEASE WITH ESOPHAGITIS WITHOUT HEMORRHAGE: ICD-10-CM

## 2021-12-03 DIAGNOSIS — I10 ESSENTIAL HYPERTENSION: ICD-10-CM

## 2021-12-03 DIAGNOSIS — E11.9 TYPE 2 DIABETES MELLITUS WITHOUT COMPLICATION, WITHOUT LONG-TERM CURRENT USE OF INSULIN (HCC): ICD-10-CM

## 2021-12-03 DIAGNOSIS — R73.01 IMPAIRED FASTING GLUCOSE: ICD-10-CM

## 2021-12-03 DIAGNOSIS — Z00.00 ANNUAL PHYSICAL EXAM: Primary | ICD-10-CM

## 2021-12-03 PROCEDURE — 99396 PREV VISIT EST AGE 40-64: CPT | Performed by: FAMILY MEDICINE

## 2021-12-03 PROCEDURE — 93000 ELECTROCARDIOGRAM COMPLETE: CPT | Performed by: FAMILY MEDICINE

## 2021-12-03 RX ORDER — METFORMIN HYDROCHLORIDE 500 MG/1
TABLET, EXTENDED RELEASE ORAL
Qty: 180 TABLET | Refills: 3 | Status: SHIPPED
Start: 2021-12-03 | End: 2022-01-13 | Stop reason: SDUPTHER

## 2021-12-03 ASSESSMENT — ENCOUNTER SYMPTOMS
RESPIRATORY NEGATIVE: 1
EYES NEGATIVE: 1
ALLERGIC/IMMUNOLOGIC NEGATIVE: 1
GASTROINTESTINAL NEGATIVE: 1

## 2021-12-03 NOTE — PROGRESS NOTES
12/3/21     Hakeem Ramírez    : 1965 Sex: female   Age: 63 y.o. Chief Complaint   Patient presents with    Annual Exam    Discuss Labs    Diabetes       Prior to Admission medications    Medication Sig Start Date End Date Taking? Authorizing Provider   metFORMIN (GLUCOPHAGE-XR) 500 MG extended release tablet 1morning 1 dinner 12/3/21  Yes Alveda Marshal Traikoff, DO   busPIRone (BUSPAR) 5 MG tablet Take 1 tablet by mouth 2 times daily 21  Yes Alveda Marshal Traikoff, DO   desvenlafaxine succinate (PRISTIQ) 100 MG TB24 extended release tablet TAKE 1 TABLET DAILY 21  Yes Alveda Marshal Traikoff, DO   rosuvastatin (CRESTOR) 5 MG tablet Take 1 tablet by mouth daily 21  Yes Alveda Marshal Traikoff, DO   pantoprazole (PROTONIX) 40 MG tablet Take 1 tablet by mouth daily 21  Yes Alveda Marshal Traikoff, DO   amitriptyline (ELAVIL) 25 MG tablet Take 1 tablet by mouth nightly 21  Yes Alveda Marshal Traikoff, DO   sucralfate (CARAFATE) 1 GM tablet Prn 3/14/20  Yes Historical Provider, MD          HPI: Patient evaluated today with maintenance physical hypertension reflux disease impaired fasting glucose anxiety diabetes mellitus          Review of Systems   Constitutional: Negative. HENT: Negative. Eyes: Negative. Respiratory: Negative. Gastrointestinal: Negative. Endocrine: Negative. Genitourinary: Negative. Musculoskeletal: Negative. Skin: Negative. Allergic/Immunologic: Negative. Neurological: Negative. Hematological: Negative. Psychiatric/Behavioral: Negative.                Current Outpatient Medications:     metFORMIN (GLUCOPHAGE-XR) 500 MG extended release tablet, 1morning 1 dinner, Disp: 180 tablet, Rfl: 3    busPIRone (BUSPAR) 5 MG tablet, Take 1 tablet by mouth 2 times daily, Disp: 180 tablet, Rfl: 1    desvenlafaxine succinate (PRISTIQ) 100 MG TB24 extended release tablet, TAKE 1 TABLET DAILY, Disp: 90 tablet, Rfl: 3    rosuvastatin (CRESTOR) 5 MG tablet, Take 1 tablet by mouth daily, Disp: 90 tablet, Rfl: 3    pantoprazole (PROTONIX) 40 MG tablet, Take 1 tablet by mouth daily, Disp: 90 tablet, Rfl: 3    amitriptyline (ELAVIL) 25 MG tablet, Take 1 tablet by mouth nightly, Disp: 90 tablet, Rfl: 3    sucralfate (CARAFATE) 1 GM tablet, Prn, Disp: , Rfl:     Allergies   Allergen Reactions    Iodine        Social History     Tobacco Use    Smoking status: Never Smoker    Smokeless tobacco: Never Used   Substance Use Topics    Alcohol use: Not on file    Drug use: Not on file      No past surgical history on file. Family History   Problem Relation Age of Onset    Diabetes Father     Heart Disease Father      Past Medical History:   Diagnosis Date    Depression     Gastritis     Hyperlipidemia        Vitals:    12/03/21 0718   BP: 122/84   Pulse: 67   Temp: 98 °F (36.7 °C)   SpO2: 98%   Weight: 226 lb (102.5 kg)     BP Readings from Last 3 Encounters:   12/03/21 122/84   11/18/21 128/74   08/04/21 130/80        Physical Exam  Vitals and nursing note reviewed. Constitutional:       Appearance: She is well-developed. HENT:      Head: Normocephalic. Right Ear: External ear normal.      Left Ear: External ear normal.      Nose: Nose normal.   Eyes:      Conjunctiva/sclera: Conjunctivae normal.      Pupils: Pupils are equal, round, and reactive to light. Cardiovascular:      Rate and Rhythm: Normal rate. Pulmonary:      Breath sounds: Normal breath sounds. Abdominal:      General: Bowel sounds are normal.      Palpations: Abdomen is soft. Musculoskeletal:         General: Normal range of motion. Cervical back: Normal range of motion and neck supple. Skin:     General: Skin is warm and dry. Neurological:      Mental Status: She is alert and oriented to person, place, and time. Psychiatric:         Behavior: Behavior normal.        Vitals physical exam stable. Meds as prescribed. Adjustments with Metformin to twice a day.   Reassessment 6 weeks blood work

## 2022-01-02 PROBLEM — Z00.00 ANNUAL PHYSICAL EXAM: Status: RESOLVED | Noted: 2019-10-29 | Resolved: 2022-01-02

## 2022-01-05 ENCOUNTER — HOSPITAL ENCOUNTER (OUTPATIENT)
Dept: GENERAL RADIOLOGY | Age: 57
Discharge: HOME | End: 2022-01-07
Payer: COMMERCIAL

## 2022-01-05 VITALS — WEIGHT: 225 LBS | HEIGHT: 63 IN | BODY MASS INDEX: 39.87 KG/M2

## 2022-01-05 DIAGNOSIS — Z12.31 ENCOUNTER FOR SCREENING MAMMOGRAM FOR MALIGNANT NEOPLASM OF BREAST: ICD-10-CM

## 2022-01-05 PROCEDURE — 77063 BREAST TOMOSYNTHESIS BI: CPT

## 2022-01-06 ENCOUNTER — TELEPHONE (OUTPATIENT)
Dept: GENERAL RADIOLOGY | Age: 57
End: 2022-01-06

## 2022-01-06 DIAGNOSIS — R92.8 ABNORMAL MAMMOGRAM: Primary | ICD-10-CM

## 2022-01-06 NOTE — TELEPHONE ENCOUNTER
Call to patient in reference to her mammogram performed at [facility] on January 5, 2022. Instructed patient that the radiologist has recommended some additional breast imaging, in order to make a final determination/result. Verbalizes understanding and is agreeable to proceed.        Coy Diaz RN, BSN, [address]

## 2022-01-11 ENCOUNTER — HOSPITAL ENCOUNTER (OUTPATIENT)
Dept: GENERAL RADIOLOGY | Age: 57
Discharge: HOME | End: 2022-01-13
Payer: COMMERCIAL

## 2022-01-11 ENCOUNTER — HOSPITAL ENCOUNTER (OUTPATIENT)
Dept: GENERAL RADIOLOGY | Age: 57
End: 2022-01-11
Payer: COMMERCIAL

## 2022-01-11 DIAGNOSIS — R92.8 ABNORMAL MAMMOGRAM: ICD-10-CM

## 2022-01-11 DIAGNOSIS — R73.01 IMPAIRED FASTING GLUCOSE: ICD-10-CM

## 2022-01-11 LAB
ALBUMIN SERPL-MCNC: 4.4 G/DL (ref 3.5–5.2)
ALP BLD-CCNC: 97 U/L (ref 35–104)
ALT SERPL-CCNC: 35 U/L (ref 0–32)
ANION GAP SERPL CALCULATED.3IONS-SCNC: 17 MMOL/L (ref 7–16)
AST SERPL-CCNC: 24 U/L (ref 0–31)
BILIRUB SERPL-MCNC: 0.4 MG/DL (ref 0–1.2)
BUN BLDV-MCNC: 13 MG/DL (ref 6–20)
CALCIUM SERPL-MCNC: 9.3 MG/DL (ref 8.6–10.2)
CHLORIDE BLD-SCNC: 106 MMOL/L (ref 98–107)
CO2: 22 MMOL/L (ref 22–29)
CREAT SERPL-MCNC: 1 MG/DL (ref 0.5–1)
GFR AFRICAN AMERICAN: >60
GFR NON-AFRICAN AMERICAN: 57 ML/MIN/1.73
GLUCOSE BLD-MCNC: 172 MG/DL (ref 74–99)
HBA1C MFR BLD: 7.9 % (ref 4–5.6)
POTASSIUM SERPL-SCNC: 4.7 MMOL/L (ref 3.5–5)
SODIUM BLD-SCNC: 145 MMOL/L (ref 132–146)
TOTAL PROTEIN: 7.6 G/DL (ref 6.4–8.3)

## 2022-01-11 PROCEDURE — G0279 TOMOSYNTHESIS, MAMMO: HCPCS

## 2022-01-11 PROCEDURE — 77065 DX MAMMO INCL CAD UNI: CPT

## 2022-01-13 ENCOUNTER — OFFICE VISIT (OUTPATIENT)
Dept: PRIMARY CARE CLINIC | Age: 57
End: 2022-01-13
Payer: COMMERCIAL

## 2022-01-13 VITALS
OXYGEN SATURATION: 98 % | TEMPERATURE: 98.2 F | SYSTOLIC BLOOD PRESSURE: 120 MMHG | HEART RATE: 60 BPM | WEIGHT: 226 LBS | DIASTOLIC BLOOD PRESSURE: 74 MMHG | BODY MASS INDEX: 40.04 KG/M2 | HEIGHT: 63 IN

## 2022-01-13 DIAGNOSIS — F41.9 ANXIETY: ICD-10-CM

## 2022-01-13 DIAGNOSIS — E11.9 TYPE 2 DIABETES MELLITUS WITHOUT COMPLICATION, WITHOUT LONG-TERM CURRENT USE OF INSULIN (HCC): ICD-10-CM

## 2022-01-13 DIAGNOSIS — E78.5 HYPERLIPIDEMIA, UNSPECIFIED HYPERLIPIDEMIA TYPE: ICD-10-CM

## 2022-01-13 DIAGNOSIS — I10 ESSENTIAL HYPERTENSION: Primary | ICD-10-CM

## 2022-01-13 DIAGNOSIS — R73.01 IMPAIRED FASTING GLUCOSE: ICD-10-CM

## 2022-01-13 PROCEDURE — 99214 OFFICE O/P EST MOD 30 MIN: CPT | Performed by: FAMILY MEDICINE

## 2022-01-13 PROCEDURE — 3051F HG A1C>EQUAL 7.0%<8.0%: CPT | Performed by: FAMILY MEDICINE

## 2022-01-13 RX ORDER — BLOOD-GLUCOSE METER
1 KIT MISCELLANEOUS DAILY
Qty: 1 KIT | Refills: 0 | Status: SHIPPED
Start: 2022-01-13 | End: 2022-02-06

## 2022-01-13 RX ORDER — PANTOPRAZOLE SODIUM 40 MG/1
40 TABLET, DELAYED RELEASE ORAL DAILY
Qty: 90 TABLET | Refills: 3 | Status: SHIPPED | OUTPATIENT
Start: 2022-01-13

## 2022-01-13 RX ORDER — ROSUVASTATIN CALCIUM 5 MG/1
5 TABLET, COATED ORAL DAILY
Qty: 90 TABLET | Refills: 3 | Status: SHIPPED | OUTPATIENT
Start: 2022-01-13

## 2022-01-13 RX ORDER — BUSPIRONE HYDROCHLORIDE 5 MG/1
5 TABLET ORAL 2 TIMES DAILY
Qty: 180 TABLET | Refills: 1 | Status: SHIPPED
Start: 2022-01-13 | End: 2022-08-11

## 2022-01-13 RX ORDER — AMITRIPTYLINE HYDROCHLORIDE 25 MG/1
25 TABLET, FILM COATED ORAL NIGHTLY
Qty: 90 TABLET | Refills: 3 | Status: SHIPPED | OUTPATIENT
Start: 2022-01-13

## 2022-01-13 RX ORDER — METFORMIN HYDROCHLORIDE 500 MG/1
TABLET, EXTENDED RELEASE ORAL
Qty: 360 TABLET | Refills: 3 | Status: SHIPPED | OUTPATIENT
Start: 2022-01-13

## 2022-01-13 ASSESSMENT — ENCOUNTER SYMPTOMS
EYES NEGATIVE: 1
GASTROINTESTINAL NEGATIVE: 1
RESPIRATORY NEGATIVE: 1
ALLERGIC/IMMUNOLOGIC NEGATIVE: 1

## 2022-01-13 ASSESSMENT — PATIENT HEALTH QUESTIONNAIRE - PHQ9
1. LITTLE INTEREST OR PLEASURE IN DOING THINGS: 0
SUM OF ALL RESPONSES TO PHQ QUESTIONS 1-9: 0
SUM OF ALL RESPONSES TO PHQ9 QUESTIONS 1 & 2: 0
2. FEELING DOWN, DEPRESSED OR HOPELESS: 0
SUM OF ALL RESPONSES TO PHQ QUESTIONS 1-9: 0

## 2022-01-13 NOTE — PROGRESS NOTES
22     [place]    : 1965 Sex: female   Age: 61 y.o. Chief Complaint   Patient presents with    Discuss Labs    Hypertension    Diabetes       Prior to Admission medications    Medication Sig Start Date End Date Taking? Authorizing Provider   pantoprazole (PROTONIX) 40 MG tablet Take 1 tablet by mouth daily 22  Yes Andie Oates,    rosuvastatin (CRESTOR) 5 MG tablet Take 1 tablet by mouth daily 22  Yes Jeremias Mera, DO   amitriptyline (ELAVIL) 25 MG tablet Take 1 tablet by mouth nightly 22  Yes Andie Oaets, DO   busPIRone (BUSPAR) 5 MG tablet Take 1 tablet by mouth 2 times daily 22  Yes Andie Oates DO   metFORMIN (GLUCOPHAGE-XR) 500 MG extended release tablet 1morning 1 dinner 12/3/21  Yes Andie Oates,    desvenlafaxine succinate (PRISTIQ) 100 MG TB24 extended release tablet TAKE 1 TABLET DAILY 21  Yes Andie Oates DO   sucralfate (CARAFATE) 1 GM tablet Prn 3/14/20  Yes Historical Provider, MD          HPI: Evaluated this morning hypertension impaired fasting glucose diabetes hyperlipidemia anxiety. All currently well controlled. Medications as prescribed. Review of Systems   Constitutional: Negative. HENT: Negative. Eyes: Negative. Respiratory: Negative. Gastrointestinal: Negative. Endocrine: Negative. Genitourinary: Negative. Musculoskeletal: Negative. Skin: Negative. Allergic/Immunologic: Negative. Neurological: Negative. Hematological: Negative. Psychiatric/Behavioral: Negative. Today systems review is improved A1c improved to 7.9. Further adjustments on metformin to a gram in the morning gram at night and reassess in 3 months with blood work.           Current Outpatient Medications:     pantoprazole (PROTONIX) 40 MG tablet, Take 1 tablet by mouth daily, Disp: 90 tablet, Rfl: 3    rosuvastatin (CRESTOR) 5 MG tablet, Take 1 tablet by mouth daily, Disp: 90 tablet, Rfl: 3   amitriptyline (ELAVIL) 25 MG tablet, Take 1 tablet by mouth nightly, Disp: 90 tablet, Rfl: 3    busPIRone (BUSPAR) 5 MG tablet, Take 1 tablet by mouth 2 times daily, Disp: 180 tablet, Rfl: 1    metFORMIN (GLUCOPHAGE-XR) 500 MG extended release tablet, 1morning 1 dinner, Disp: 180 tablet, Rfl: 3    desvenlafaxine succinate (PRISTIQ) 100 MG TB24 extended release tablet, TAKE 1 TABLET DAILY, Disp: 90 tablet, Rfl: 3    sucralfate (CARAFATE) 1 GM tablet, Prn, Disp: , Rfl:     Allergies   Allergen Reactions    Iodine        Social History     Tobacco Use    Smoking status: Never Smoker    Smokeless tobacco: Never Used   Substance Use Topics    Alcohol use: Not on file    Drug use: Not on file      No past surgical history on file. Family History   Problem Relation Age of Onset    Diabetes Father     Heart Disease Father      Past Medical History:   Diagnosis Date    Depression     Gastritis     Hyperlipidemia        Vitals:    01/13/22 0815   BP: 120/74   Pulse: 60   Temp: 98.2 °F (36.8 °C)   SpO2: 98%   Weight: 226 lb (102.5 kg)   Height: 5' 3\" (1.6 m)     BP Readings from Last 3 Encounters:   01/13/22 120/74   12/03/21 122/84   11/18/21 128/74    120/82    Physical Exam  Vitals and nursing note reviewed. Constitutional:       Appearance: She is well-developed. HENT:      Head: Normocephalic. Right Ear: External ear normal.      Left Ear: External ear normal.      Nose: Nose normal.   Eyes:      Conjunctiva/sclera: Conjunctivae normal.      Pupils: Pupils are equal, round, and reactive to light. Cardiovascular:      Rate and Rhythm: Normal rate. Pulmonary:      Breath sounds: Normal breath sounds. Abdominal:      General: Bowel sounds are normal.      Palpations: Abdomen is soft. Musculoskeletal:         General: Normal range of motion. Cervical back: Normal range of motion and neck supple. Skin:     General: Skin is warm and dry.    Neurological:      Mental Status: She is alert and oriented to person, place, and time. Psychiatric:         Behavior: Behavior normal.     Today's vitals physical exam stable. I will sit tight with current meds and care. Follow-up visit with me 3 months and sooner if problems. Metformin adjusted today as noted. Labs reviewed. Lab Results   Component Value Date    TSH 1.510 11/30/2021    TSH 1.440 07/19/2021    S2DMRDJ 94.2 09/20/2013    Z9JDTZR 5.6 11/30/2021    L4BTLVR 6.7 07/19/2021     Lab Results   Component Value Date    CHOL 178 11/30/2021    CHOL 152 07/19/2021     Lab Results   Component Value Date    TRIG 166 (H) 11/30/2021    TRIG 168 (H) 07/19/2021     Lab Results   Component Value Date    HDL 35 11/30/2021    HDL 31 07/19/2021     No results found for: [facility]  Lab Results   Component Value Date    LABVLDL 33 11/30/2021    LABVLDL 34 07/19/2021     Lab Results   Component Value Date    CHOLHDLRATIO 5 01/22/2021    CHOLHDLRATIO 5 06/08/2020     Lab Results   Component Value Date    WBC 5.2 11/30/2021    HGB 14.0 11/30/2021    HCT 41.8 11/30/2021    MCV 93.3 11/30/2021     11/30/2021    LYMPHOPCT 21.3 11/30/2021    RBC 4.48 11/30/2021    MCH 31.3 11/30/2021    MCHC 33.5 11/30/2021    RDW 13.4 11/30/2021     Lab Results   Component Value Date     01/11/2022    K 4.7 01/11/2022     01/11/2022    CO2 22 01/11/2022    BUN 13 01/11/2022    CREATININE 1.0 01/11/2022    GLUCOSE 172 (H) 01/11/2022    CALCIUM 9.3 01/11/2022    PROT 7.6 01/11/2022    LABALBU 4.4 01/11/2022    BILITOT 0.4 01/11/2022    ALKPHOS 97 01/11/2022    AST 24 01/11/2022    ALT 35 (H) 01/11/2022    LABGLOM 57 01/11/2022    GFRAA >60 01/11/2022      No results found for: PSA, PSADIA   Lab Results   Component Value Date    LABA1C 7.9 (H) 01/11/2022    LABA1C 9.5 (H) 11/30/2021    LABA1C 6.9 (H) 07/19/2021     No results found for: EAG     Plan Per Assessment:  Misha Ruiz was seen today for discuss labs, hypertension and diabetes.     Diagnoses and all orders for this visit:    Essential hypertension    Impaired fasting glucose    Type 2 diabetes mellitus without complication, without long-term current use of insulin (HCC)    Hyperlipidemia, unspecified hyperlipidemia type    Other orders  -     pantoprazole (PROTONIX) 40 MG tablet; Take 1 tablet by mouth daily  -     rosuvastatin (CRESTOR) 5 MG tablet; Take 1 tablet by mouth daily  -     amitriptyline (ELAVIL) 25 MG tablet; Take 1 tablet by mouth nightly  -     busPIRone (BUSPAR) 5 MG tablet; Take 1 tablet by mouth 2 times daily            No follow-ups on file. Ramesh Gomez DO    Note was generated with the assistance of voice recognition software. Document was reviewed however may contain grammatical errors.

## 2022-02-06 ENCOUNTER — HOSPITAL ENCOUNTER (INPATIENT)
Age: 57
LOS: 2 days | Discharge: HOME | DRG: 066 | End: 2022-02-08
Attending: STUDENT IN AN ORGANIZED HEALTH CARE EDUCATION/TRAINING PROGRAM | Admitting: SURGERY
Payer: COMMERCIAL

## 2022-02-06 ENCOUNTER — APPOINTMENT (OUTPATIENT)
Dept: CT IMAGING | Age: 57
DRG: 066 | End: 2022-02-06
Payer: COMMERCIAL

## 2022-02-06 DIAGNOSIS — S00.03XA HEMATOMA OF SCALP, INITIAL ENCOUNTER: ICD-10-CM

## 2022-02-06 DIAGNOSIS — S06.5XAA SUBDURAL HEMATOMA: ICD-10-CM

## 2022-02-06 DIAGNOSIS — I60.9 SUBARACHNOID HEMORRHAGE (HCC): Primary | ICD-10-CM

## 2022-02-06 DIAGNOSIS — S06.0X0A CONCUSSION WITHOUT LOSS OF CONSCIOUSNESS, INITIAL ENCOUNTER: ICD-10-CM
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
## 2022-02-06 DIAGNOSIS — S09.90XA CLOSED HEAD INJURY, INITIAL ENCOUNTER: ICD-10-CM

## 2022-02-06 PROBLEM — T14.90XA TRAUMA: Status: ACTIVE | Noted: 2022-02-06

## 2022-02-06 LAB
ALBUMIN SERPL-MCNC: 4.6 G/DL (ref 3.5–5.2)
ALP BLD-CCNC: 87 U/L (ref 35–104)
ALT SERPL-CCNC: 31 U/L (ref 0–32)
ANION GAP SERPL CALCULATED.3IONS-SCNC: 11 MMOL/L (ref 7–16)
APTT: 34.4 SEC (ref 24.5–35.1)
AST SERPL-CCNC: 22 U/L (ref 0–31)
BASOPHILS ABSOLUTE: 0.06 E9/L (ref 0–0.2)
BASOPHILS RELATIVE PERCENT: 0.6 % (ref 0–2)
BILIRUB SERPL-MCNC: 0.3 MG/DL (ref 0–1.2)
BUN BLDV-MCNC: 15 MG/DL (ref 6–20)
CALCIUM SERPL-MCNC: 9.7 MG/DL (ref 8.6–10.2)
CHLORIDE BLD-SCNC: 103 MMOL/L (ref 98–107)
CO2: 26 MMOL/L (ref 22–29)
CREAT SERPL-MCNC: 1 MG/DL (ref 0.5–1)
EOSINOPHILS ABSOLUTE: 0.35 E9/L (ref 0.05–0.5)
EOSINOPHILS RELATIVE PERCENT: 3.6 % (ref 0–6)
GFR AFRICAN AMERICAN: >60
GFR NON-AFRICAN AMERICAN: 57 ML/MIN/1.73
GLUCOSE BLD-MCNC: 118 MG/DL (ref 74–99)
HCT VFR BLD CALC: 37.2 % (ref 34–48)
HEMOGLOBIN: 12.7 G/DL (ref 11.5–15.5)
IMMATURE GRANULOCYTES #: 0.04 E9/L
IMMATURE GRANULOCYTES %: 0.4 % (ref 0–5)
INR BLD: 1
LYMPHOCYTES ABSOLUTE: 1.46 E9/L (ref 1.5–4)
LYMPHOCYTES RELATIVE PERCENT: 15.2 % (ref 20–42)
MCH RBC QN AUTO: 30.8 PG (ref 26–35)
MCHC RBC AUTO-ENTMCNC: 34.1 % (ref 32–34.5)
MCV RBC AUTO: 90.3 FL (ref 80–99.9)
MONOCYTES ABSOLUTE: 0.79 E9/L (ref 0.1–0.95)
MONOCYTES RELATIVE PERCENT: 8.2 % (ref 2–12)
NEUTROPHILS ABSOLUTE: 6.92 E9/L (ref 1.8–7.3)
NEUTROPHILS RELATIVE PERCENT: 72 % (ref 43–80)
PDW BLD-RTO: 13.2 FL (ref 11.5–15)
PLATELET # BLD: 362 E9/L (ref 130–450)
PMV BLD AUTO: 9 FL (ref 7–12)
POTASSIUM SERPL-SCNC: 4 MMOL/L (ref 3.5–5)
PROTHROMBIN TIME: 11 SEC (ref 9.3–12.4)
RBC # BLD: 4.12 E12/L (ref 3.5–5.5)
SODIUM BLD-SCNC: 140 MMOL/L (ref 132–146)
TOTAL PROTEIN: 7.6 G/DL (ref 6.4–8.3)
WBC # BLD: 9.6 E9/L (ref 4.5–11.5)

## 2022-02-06 PROCEDURE — 72125 CT NECK SPINE W/O DYE: CPT

## 2022-02-06 PROCEDURE — 70450 CT HEAD/BRAIN W/O DYE: CPT

## 2022-02-06 PROCEDURE — 80053 COMPREHEN METABOLIC PANEL: CPT

## 2022-02-06 PROCEDURE — 6370000000 HC RX 637 (ALT 250 FOR IP): Performed by: NURSE PRACTITIONER

## 2022-02-06 PROCEDURE — 90714 TD VACC NO PRESV 7 YRS+ IM: CPT | Performed by: NURSE PRACTITIONER

## 2022-02-06 PROCEDURE — 85025 COMPLETE CBC W/AUTO DIFF WBC: CPT

## 2022-02-06 PROCEDURE — 85610 PROTHROMBIN TIME: CPT

## 2022-02-06 PROCEDURE — 85576 BLOOD PLATELET AGGREGATION: CPT

## 2022-02-06 PROCEDURE — 85347 COAGULATION TIME ACTIVATED: CPT

## 2022-02-06 PROCEDURE — 6360000002 HC RX W HCPCS: Performed by: NURSE PRACTITIONER

## 2022-02-06 PROCEDURE — 96365 THER/PROPH/DIAG IV INF INIT: CPT

## 2022-02-06 PROCEDURE — 85384 FIBRINOGEN ACTIVITY: CPT

## 2022-02-06 PROCEDURE — 2060000000 HC ICU INTERMEDIATE R&B

## 2022-02-06 PROCEDURE — 96375 TX/PRO/DX INJ NEW DRUG ADDON: CPT

## 2022-02-06 PROCEDURE — 90471 IMMUNIZATION ADMIN: CPT | Performed by: NURSE PRACTITIONER

## 2022-02-06 PROCEDURE — 85730 THROMBOPLASTIN TIME PARTIAL: CPT

## 2022-02-06 PROCEDURE — 99283 EMERGENCY DEPT VISIT LOW MDM: CPT

## 2022-02-06 RX ORDER — SODIUM CHLORIDE 9 MG/ML
INJECTION, SOLUTION INTRAVENOUS CONTINUOUS
Status: DISCONTINUED | OUTPATIENT
Start: 2022-02-06 | End: 2022-02-08 | Stop reason: HOSPADM

## 2022-02-06 RX ORDER — ACETAMINOPHEN 325 MG/1
650 TABLET ORAL EVERY 4 HOURS PRN
Status: DISCONTINUED | OUTPATIENT
Start: 2022-02-06 | End: 2022-02-08 | Stop reason: HOSPADM

## 2022-02-06 RX ORDER — SODIUM CHLORIDE 9 MG/ML
25 INJECTION, SOLUTION INTRAVENOUS PRN
Status: DISCONTINUED | OUTPATIENT
Start: 2022-02-06 | End: 2022-02-08 | Stop reason: HOSPADM

## 2022-02-06 RX ORDER — TETANUS AND DIPHTHERIA TOXOIDS ADSORBED 2; 2 [LF]/.5ML; [LF]/.5ML
0.5 INJECTION INTRAMUSCULAR ONCE
Status: COMPLETED | OUTPATIENT
Start: 2022-02-06 | End: 2022-02-06

## 2022-02-06 RX ORDER — SODIUM CHLORIDE 0.9 % (FLUSH) 0.9 %
10 SYRINGE (ML) INJECTION PRN
Status: DISCONTINUED | OUTPATIENT
Start: 2022-02-06 | End: 2022-02-08 | Stop reason: HOSPADM

## 2022-02-06 RX ORDER — DIAPER,BRIEF,INFANT-TODD,DISP
EACH MISCELLANEOUS ONCE
Status: COMPLETED | OUTPATIENT
Start: 2022-02-06 | End: 2022-02-06

## 2022-02-06 RX ORDER — OXYCODONE HYDROCHLORIDE 10 MG/1
10 TABLET ORAL EVERY 4 HOURS PRN
Status: DISCONTINUED | OUTPATIENT
Start: 2022-02-06 | End: 2022-02-08 | Stop reason: HOSPADM

## 2022-02-06 RX ORDER — LEVETIRACETAM 10 MG/ML
1000 INJECTION INTRAVASCULAR ONCE
Status: COMPLETED | OUTPATIENT
Start: 2022-02-06 | End: 2022-02-06

## 2022-02-06 RX ORDER — LEVETIRACETAM 500 MG/1
500 TABLET ORAL 2 TIMES DAILY
Status: DISCONTINUED | OUTPATIENT
Start: 2022-02-06 | End: 2022-02-08 | Stop reason: HOSPADM

## 2022-02-06 RX ORDER — GABAPENTIN 100 MG/1
100 CAPSULE ORAL EVERY 8 HOURS
Status: DISCONTINUED | OUTPATIENT
Start: 2022-02-06 | End: 2022-02-08 | Stop reason: HOSPADM

## 2022-02-06 RX ORDER — METHOCARBAMOL 500 MG/1
750 TABLET, FILM COATED ORAL 4 TIMES DAILY
Status: DISCONTINUED | OUTPATIENT
Start: 2022-02-06 | End: 2022-02-08 | Stop reason: HOSPADM

## 2022-02-06 RX ORDER — SODIUM CHLORIDE 0.9 % (FLUSH) 0.9 %
10 SYRINGE (ML) INJECTION EVERY 12 HOURS SCHEDULED
Status: DISCONTINUED | OUTPATIENT
Start: 2022-02-06 | End: 2022-02-08 | Stop reason: HOSPADM

## 2022-02-06 RX ORDER — SENNA AND DOCUSATE SODIUM 50; 8.6 MG/1; MG/1
1 TABLET, FILM COATED ORAL 2 TIMES DAILY
Status: DISCONTINUED | OUTPATIENT
Start: 2022-02-06 | End: 2022-02-08 | Stop reason: HOSPADM

## 2022-02-06 RX ORDER — ONDANSETRON 2 MG/ML
4 INJECTION INTRAMUSCULAR; INTRAVENOUS EVERY 6 HOURS PRN
Status: DISCONTINUED | OUTPATIENT
Start: 2022-02-06 | End: 2022-02-08 | Stop reason: HOSPADM

## 2022-02-06 RX ORDER — POLYETHYLENE GLYCOL 3350 17 G/17G
17 POWDER, FOR SOLUTION ORAL DAILY
Status: DISCONTINUED | OUTPATIENT
Start: 2022-02-07 | End: 2022-02-08 | Stop reason: HOSPADM

## 2022-02-06 RX ORDER — ONDANSETRON 2 MG/ML
4 INJECTION INTRAMUSCULAR; INTRAVENOUS ONCE
Status: COMPLETED | OUTPATIENT
Start: 2022-02-06 | End: 2022-02-06

## 2022-02-06 RX ORDER — OXYCODONE HYDROCHLORIDE AND ACETAMINOPHEN 5; 325 MG/1; MG/1
1 TABLET ORAL ONCE
Status: COMPLETED | OUTPATIENT
Start: 2022-02-06 | End: 2022-02-06

## 2022-02-06 RX ORDER — OXYCODONE HYDROCHLORIDE 5 MG/1
5 TABLET ORAL EVERY 4 HOURS PRN
Status: DISCONTINUED | OUTPATIENT
Start: 2022-02-06 | End: 2022-02-08 | Stop reason: HOSPADM

## 2022-02-06 RX ORDER — ONDANSETRON 4 MG/1
4 TABLET, ORALLY DISINTEGRATING ORAL EVERY 8 HOURS PRN
Status: DISCONTINUED | OUTPATIENT
Start: 2022-02-06 | End: 2022-02-08 | Stop reason: HOSPADM

## 2022-02-06 RX ADMIN — BACITRACIN ZINC: 500 OINTMENT TOPICAL at 21:31

## 2022-02-06 RX ADMIN — OXYCODONE AND ACETAMINOPHEN 1 TABLET: 5; 325 TABLET ORAL at 21:27

## 2022-02-06 RX ADMIN — LEVETIRACETAM 1000 MG: 10 INJECTION, SOLUTION INTRAVENOUS at 22:39

## 2022-02-06 RX ADMIN — ONDANSETRON 4 MG: 2 INJECTION INTRAMUSCULAR; INTRAVENOUS at 22:41

## 2022-02-06 RX ADMIN — TETANUS AND DIPHTHERIA TOXOIDS ADSORBED 0.5 ML: 2; 2 INJECTION INTRAMUSCULAR at 21:28

## 2022-02-06 ASSESSMENT — PAIN SCALES - GENERAL
PAINLEVEL_OUTOF10: 6
PAINLEVEL_OUTOF10: 9
PAINLEVEL_OUTOF10: 6

## 2022-02-07 ENCOUNTER — APPOINTMENT (OUTPATIENT)
Dept: CT IMAGING | Age: 57
DRG: 066 | End: 2022-02-07
Payer: COMMERCIAL

## 2022-02-07 PROBLEM — S06.5XAA SDH (SUBDURAL HEMATOMA): Status: ACTIVE | Noted: 2022-02-07

## 2022-02-07 PROBLEM — I60.9 SAH (SUBARACHNOID HEMORRHAGE) (HCC): Status: ACTIVE | Noted: 2022-02-07

## 2022-02-07 LAB
ANGLE (CLOT STRENGTH): 73.9 DEGREE (ref 59–74)
ANION GAP SERPL CALCULATED.3IONS-SCNC: 14 MMOL/L (ref 7–16)
BASOPHILS ABSOLUTE: 0.04 E9/L (ref 0–0.2)
BASOPHILS RELATIVE PERCENT: 0.5 % (ref 0–2)
BUN BLDV-MCNC: 13 MG/DL (ref 6–20)
CALCIUM SERPL-MCNC: 8.9 MG/DL (ref 8.6–10.2)
CHLORIDE BLD-SCNC: 104 MMOL/L (ref 98–107)
CO2: 22 MMOL/L (ref 22–29)
CREAT SERPL-MCNC: 1 MG/DL (ref 0.5–1)
EOSINOPHILS ABSOLUTE: 0.29 E9/L (ref 0.05–0.5)
EOSINOPHILS RELATIVE PERCENT: 3.6 % (ref 0–6)
EPL-TEG: 0 % (ref 0–15)
G-TEG: 15 K D/SC (ref 4.5–11)
GFR AFRICAN AMERICAN: >60
GFR NON-AFRICAN AMERICAN: 57 ML/MIN/1.73
GLUCOSE BLD-MCNC: 197 MG/DL (ref 74–99)
HCT VFR BLD CALC: 36.7 % (ref 34–48)
HEMOGLOBIN: 12.5 G/DL (ref 11.5–15.5)
IMMATURE GRANULOCYTES #: 0.03 E9/L
IMMATURE GRANULOCYTES %: 0.4 % (ref 0–5)
K (CLOTTING TIME): 0.8 MIN (ref 1–3)
LY30 (FIBRINOLYSIS): 0 % (ref 0–8)
LYMPHOCYTES ABSOLUTE: 1.19 E9/L (ref 1.5–4)
LYMPHOCYTES RELATIVE PERCENT: 14.7 % (ref 20–42)
MA (MAX AMPLITUDE): 74.9 MM (ref 50–70)
MCH RBC QN AUTO: 31 PG (ref 26–35)
MCHC RBC AUTO-ENTMCNC: 34.1 % (ref 32–34.5)
MCV RBC AUTO: 91.1 FL (ref 80–99.9)
METER GLUCOSE: 121 MG/DL (ref 74–99)
METER GLUCOSE: 175 MG/DL (ref 74–99)
METER GLUCOSE: 183 MG/DL (ref 74–99)
METER GLUCOSE: 196 MG/DL (ref 74–99)
METER GLUCOSE: 92 MG/DL (ref 74–99)
MONOCYTES ABSOLUTE: 0.65 E9/L (ref 0.1–0.95)
MONOCYTES RELATIVE PERCENT: 8 % (ref 2–12)
NEUTROPHILS ABSOLUTE: 5.9 E9/L (ref 1.8–7.3)
NEUTROPHILS RELATIVE PERCENT: 72.8 % (ref 43–80)
PDW BLD-RTO: 13.2 FL (ref 11.5–15)
PLATELET # BLD: 293 E9/L (ref 130–450)
PMV BLD AUTO: 9 FL (ref 7–12)
POTASSIUM REFLEX MAGNESIUM: 4.3 MMOL/L (ref 3.5–5)
R (REACTION TIME): 4.8 MIN (ref 5–10)
RBC # BLD: 4.03 E12/L (ref 3.5–5.5)
SODIUM BLD-SCNC: 140 MMOL/L (ref 132–146)
WBC # BLD: 8.1 E9/L (ref 4.5–11.5)

## 2022-02-07 PROCEDURE — 6360000002 HC RX W HCPCS

## 2022-02-07 PROCEDURE — 2580000003 HC RX 258

## 2022-02-07 PROCEDURE — 97165 OT EVAL LOW COMPLEX 30 MIN: CPT

## 2022-02-07 PROCEDURE — 82962 GLUCOSE BLOOD TEST: CPT

## 2022-02-07 PROCEDURE — 80048 BASIC METABOLIC PNL TOTAL CA: CPT

## 2022-02-07 PROCEDURE — 36415 COLL VENOUS BLD VENIPUNCTURE: CPT

## 2022-02-07 PROCEDURE — 2060000000 HC ICU INTERMEDIATE R&B

## 2022-02-07 PROCEDURE — 70450 CT HEAD/BRAIN W/O DYE: CPT

## 2022-02-07 PROCEDURE — 99232 SBSQ HOSP IP/OBS MODERATE 35: CPT | Performed by: SURGERY

## 2022-02-07 PROCEDURE — 99222 1ST HOSP IP/OBS MODERATE 55: CPT | Performed by: NEUROLOGICAL SURGERY

## 2022-02-07 PROCEDURE — 85025 COMPLETE CBC W/AUTO DIFF WBC: CPT

## 2022-02-07 PROCEDURE — 97161 PT EVAL LOW COMPLEX 20 MIN: CPT

## 2022-02-07 PROCEDURE — 6370000000 HC RX 637 (ALT 250 FOR IP)

## 2022-02-07 PROCEDURE — 97530 THERAPEUTIC ACTIVITIES: CPT

## 2022-02-07 RX ORDER — ROSUVASTATIN CALCIUM 5 MG/1
5 TABLET, COATED ORAL DAILY
Status: DISCONTINUED | OUTPATIENT
Start: 2022-02-07 | End: 2022-02-08 | Stop reason: HOSPADM

## 2022-02-07 RX ORDER — AMITRIPTYLINE HYDROCHLORIDE 25 MG/1
25 TABLET, FILM COATED ORAL NIGHTLY
Status: DISCONTINUED | OUTPATIENT
Start: 2022-02-07 | End: 2022-02-08 | Stop reason: HOSPADM

## 2022-02-07 RX ORDER — HYDRALAZINE HYDROCHLORIDE 20 MG/ML
10 INJECTION INTRAMUSCULAR; INTRAVENOUS EVERY 6 HOURS PRN
Status: DISCONTINUED | OUTPATIENT
Start: 2022-02-07 | End: 2022-02-08 | Stop reason: HOSPADM

## 2022-02-07 RX ORDER — PANTOPRAZOLE SODIUM 40 MG/1
40 TABLET, DELAYED RELEASE ORAL DAILY
Status: DISCONTINUED | OUTPATIENT
Start: 2022-02-07 | End: 2022-02-08 | Stop reason: HOSPADM

## 2022-02-07 RX ORDER — DEXTROSE MONOHYDRATE 50 MG/ML
100 INJECTION, SOLUTION INTRAVENOUS PRN
Status: DISCONTINUED | OUTPATIENT
Start: 2022-02-07 | End: 2022-02-08 | Stop reason: HOSPADM

## 2022-02-07 RX ORDER — DESVENLAFAXINE 100 MG/1
1 TABLET, EXTENDED RELEASE ORAL DAILY
Status: CANCELLED | OUTPATIENT
Start: 2022-02-07

## 2022-02-07 RX ORDER — DESVENLAFAXINE 50 MG/1
100 TABLET, EXTENDED RELEASE ORAL DAILY
Status: DISCONTINUED | OUTPATIENT
Start: 2022-02-07 | End: 2022-02-08 | Stop reason: HOSPADM

## 2022-02-07 RX ORDER — BUSPIRONE HYDROCHLORIDE 5 MG/1
5 TABLET ORAL 2 TIMES DAILY
Status: DISCONTINUED | OUTPATIENT
Start: 2022-02-07 | End: 2022-02-08 | Stop reason: HOSPADM

## 2022-02-07 RX ORDER — DEXTROSE MONOHYDRATE 25 G/50ML
12.5 INJECTION, SOLUTION INTRAVENOUS PRN
Status: DISCONTINUED | OUTPATIENT
Start: 2022-02-07 | End: 2022-02-08 | Stop reason: HOSPADM

## 2022-02-07 RX ORDER — NICOTINE POLACRILEX 4 MG
15 LOZENGE BUCCAL PRN
Status: DISCONTINUED | OUTPATIENT
Start: 2022-02-07 | End: 2022-02-08 | Stop reason: HOSPADM

## 2022-02-07 RX ADMIN — LEVETIRACETAM 500 MG: 500 TABLET, FILM COATED ORAL at 08:42

## 2022-02-07 RX ADMIN — ROSUVASTATIN 5 MG: 10 TABLET, FILM COATED ORAL at 08:41

## 2022-02-07 RX ADMIN — OXYCODONE HYDROCHLORIDE 10 MG: 10 TABLET ORAL at 08:46

## 2022-02-07 RX ADMIN — INSULIN LISPRO 1 UNITS: 100 INJECTION, SOLUTION INTRAVENOUS; SUBCUTANEOUS at 21:45

## 2022-02-07 RX ADMIN — DESVENLAFAXINE 100 MG: 50 TABLET, EXTENDED RELEASE ORAL at 08:41

## 2022-02-07 RX ADMIN — LEVETIRACETAM 500 MG: 500 TABLET, FILM COATED ORAL at 02:24

## 2022-02-07 RX ADMIN — BUSPIRONE HYDROCHLORIDE 5 MG: 5 TABLET ORAL at 23:33

## 2022-02-07 RX ADMIN — PANTOPRAZOLE SODIUM 40 MG: 40 TABLET, DELAYED RELEASE ORAL at 08:41

## 2022-02-07 RX ADMIN — METHOCARBAMOL 750 MG: 500 TABLET ORAL at 21:46

## 2022-02-07 RX ADMIN — SENNOSIDES AND DOCUSATE SODIUM 1 TABLET: 50; 8.6 TABLET ORAL at 08:41

## 2022-02-07 RX ADMIN — GABAPENTIN 100 MG: 100 CAPSULE ORAL at 02:24

## 2022-02-07 RX ADMIN — SENNOSIDES AND DOCUSATE SODIUM 1 TABLET: 50; 8.6 TABLET ORAL at 21:46

## 2022-02-07 RX ADMIN — ONDANSETRON 4 MG: 2 INJECTION INTRAMUSCULAR; INTRAVENOUS at 07:42

## 2022-02-07 RX ADMIN — INSULIN LISPRO 1 UNITS: 100 INJECTION, SOLUTION INTRAVENOUS; SUBCUTANEOUS at 12:07

## 2022-02-07 RX ADMIN — SODIUM CHLORIDE, PRESERVATIVE FREE 10 ML: 5 INJECTION INTRAVENOUS at 02:28

## 2022-02-07 RX ADMIN — METHOCARBAMOL 750 MG: 500 TABLET ORAL at 02:24

## 2022-02-07 RX ADMIN — METHOCARBAMOL 750 MG: 500 TABLET ORAL at 14:08

## 2022-02-07 RX ADMIN — METHOCARBAMOL 750 MG: 500 TABLET ORAL at 08:41

## 2022-02-07 RX ADMIN — AMITRIPTYLINE HYDROCHLORIDE 25 MG: 25 TABLET, FILM COATED ORAL at 21:46

## 2022-02-07 RX ADMIN — LEVETIRACETAM 500 MG: 500 TABLET, FILM COATED ORAL at 21:46

## 2022-02-07 RX ADMIN — SODIUM CHLORIDE, PRESERVATIVE FREE 10 ML: 5 INJECTION INTRAVENOUS at 21:50

## 2022-02-07 RX ADMIN — GABAPENTIN 100 MG: 100 CAPSULE ORAL at 23:33

## 2022-02-07 RX ADMIN — OXYCODONE HYDROCHLORIDE 10 MG: 10 TABLET ORAL at 02:24

## 2022-02-07 RX ADMIN — INSULIN LISPRO 1 UNITS: 100 INJECTION, SOLUTION INTRAVENOUS; SUBCUTANEOUS at 08:48

## 2022-02-07 RX ADMIN — ACETAMINOPHEN 650 MG: 325 TABLET ORAL at 21:46

## 2022-02-07 RX ADMIN — GABAPENTIN 100 MG: 100 CAPSULE ORAL at 08:41

## 2022-02-07 RX ADMIN — SODIUM CHLORIDE: 9 INJECTION, SOLUTION INTRAVENOUS at 02:27

## 2022-02-07 RX ADMIN — BUSPIRONE HYDROCHLORIDE 5 MG: 5 TABLET ORAL at 08:41

## 2022-02-07 ASSESSMENT — ENCOUNTER SYMPTOMS
BACK PAIN: 0
TROUBLE SWALLOWING: 0
PHOTOPHOBIA: 0
SHORTNESS OF BREATH: 0
ABDOMINAL PAIN: 0

## 2022-02-07 ASSESSMENT — PAIN SCALES - GENERAL
PAINLEVEL_OUTOF10: 8
PAINLEVEL_OUTOF10: 0
PAINLEVEL_OUTOF10: 7
PAINLEVEL_OUTOF10: 4

## 2022-02-07 NOTE — CARE COORDINATION
SW met with patient in her room. She states she lives home with her mother in a 2 story home. There are steps to get into the house and she staets states she goes to the second floor for her bed and bath as well. 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 Her PCP is Dr Singh Villareal, her pharmacy is [facility] in [facility]. Per patient she has no history of HHC or MELODIE. We talked about current therapy evals and transition of care. She states plan is home at discharge. She is currently declining [facility] and states she feels she will be ok at home, she is aware if she feels she needs it after discharge, she can call PCP office for assistance with orders and arranging.

## 2022-02-07 NOTE — CONSULTS
NEUROSURGERY CONSULTATION     Chief Complaint: seen for SDH and SAH    HPI:   Ponce Lopez is a 61 y.o.  female who presents to the ED c/o headache after falling. Pt states she slipped on ice and hit her head. Denies LOC. She is not on any anticoagulation. Currently c/o headache and right eye blurry vision. Nothing makes the symptoms better or worse. Head CT scan demonstrates small parafalcine and tentorial subdural hematoma for which neurosurgery was consulted. Denies loss of bowel or bladder, saddle anesthesia, numbness, tingling, seizure, fever, chills, SOB, or chest pain. Past Medical History:   Diagnosis Date    Depression     Diabetes mellitus ([facility] [address]     Gastritis     Hyperlipidemia      Past Surgical History:   Procedure Laterality Date    TONSILLECTOMY      at age 9      Family History   Problem Relation Age of Onset    Diabetes Father     Heart Disease Father       Social History     Socioeconomic History    Marital status:      Spouse name: Not on file    Number of children: Not on file    Years of education: Not on file    Highest education level: Not on file   Occupational History    Not on file   Tobacco Use    Smoking status: Never Smoker    Smokeless tobacco: Never Used   Substance and Sexual Activity    Alcohol use: Not on file    Drug use: Not on file    Sexual activity: Not on file   Other Topics Concern    Not on file   Social History Narrative    Not on file     Social Determinants of Health     Financial Resource Strain:     Difficulty of Paying Living Expenses: Not on file   Food Insecurity:     Worried About [address] in the Last Year: Not on file    Ran Out of Food in the Last Year: Not on file   Transportation Needs:     Lack of Transportation (Medical): Not on file    Lack of Transportation (Non-Medical):  Not on file   Physical Activity:     Days of Exercise per Week: Not on file    Minutes of Exercise per Session: Not on file   Stress:     Feeling of Stress : Not on file   Social Connections:     Frequency of Communication with Friends and Family: Not on file    Frequency of Social Gatherings with Friends and Family: Not on file    Attends Spiritism Services: Not on file    Active Member of Clubs or Organizations: Not on file    Attends Club or Organization Meetings: Not on file    Marital Status: Not on file   Intimate Partner Violence:     Fear of Current or Ex-Partner: Not on file    Emotionally Abused: Not on file    Physically Abused: Not on file    Sexually Abused: Not on file   Housing Stability:     Unable to Pay for Housing in the Last Year: Not on file    Number of [place] in the Last Year: Not on file    Unstable Housing in the Last Year: Not on file       Medications:   Current Facility-Administered Medications   Medication Dose Route Frequency Provider Last Rate Last Admin    hydrALAZINE (APRESOLINE) injection 10 mg  10 mg IntraVENous Q6H PRN Shanika Geiger MD        amitriptyline (ELAVIL) tablet 25 mg  25 mg Oral Nightly Shanika Geiger MD        busPIRone (BUSPAR) tablet 5 mg  5 mg Oral BID Shanika Geiger MD   5 mg at 02/07/22 0841    pantoprazole (PROTONIX) tablet 40 mg  40 mg Oral Daily Shanika Geiger MD   40 mg at 02/07/22 0841    rosuvastatin (CRESTOR) tablet 5 mg  5 mg Oral Daily Shanika Geiger MD   5 mg at 02/07/22 0841    insulin lispro (HUMALOG) injection vial 0-6 Units  0-6 Units SubCUTAneous TID WC Shanika Geiger MD   1 Units at 02/07/22 1207    insulin lispro (HUMALOG) injection vial 0-3 Units  0-3 Units SubCUTAneous Nightly Shanika Geiger MD        desvenlafaxine succinate (PRISTIQ) extended release tablet 100 mg  100 mg Oral Daily Shanika Geiger MD   100 mg at 02/07/22 0841    glucose (GLUTOSE) 40 % oral gel 15 g  15 g Oral PRN Clementine Duffy MD        dextrose 50 % IV solution  12.5 g IntraVENous PRN Clementine Duffy MD        glucagon (rDNA) injection 1 mg  1 mg IntraMUSCular PRN Shanika Geiger MD        dextrose 5 % solution  100 mL/hr IntraVENous PRN Ran Wheatley MD        sodium chloride flush 0.9 % injection 10 mL  10 mL IntraVENous 2 times per day Ran Wheatley MD   10 mL at 02/07/22 0228    sodium chloride flush 0.9 % injection 10 mL  10 mL IntraVENous GANESH Wheatley MD        0.9 % sodium chloride infusion  25 mL IntraVENous PRN Ran Wheatley MD        ondansetron (ZOFRAN-ODT) disintegrating tablet 4 mg  4 mg Oral Q8H PRN Ran Wheatlye MD        Or    ondansetron (ZOFRAN) injection 4 mg  4 mg IntraVENous Q6H PRN Ran Wheatley MD   4 mg at 02/07/22 0742    polyethylene glycol (GLYCOLAX) packet 17 g  17 g Oral Daily Clementine Duffy MD        0.9 % sodium chloride infusion   IntraVENous Continuous Ran Wheatley  mL/hr at 02/07/22 0227 New Bag at 02/07/22 0227    acetaminophen (TYLENOL) tablet 650 mg  650 mg Oral Q4H PRN Ran Wheatley MD        oxyCODONE (ROXICODONE) immediate release tablet 5 mg  5 mg Oral Q4H PRN Ran Wheatley MD        Or    oxyCODONE HCl (OXY-IR) immediate release tablet 10 mg  10 mg Oral Q4H PRN Ran Wheatley MD   10 mg at 02/07/22 0846    sennosides-docusate sodium (SENOKOT-S) 8.6-50 MG tablet 1 tablet  1 tablet Oral BID Ran Wheatley MD   1 tablet at 02/07/22 0841    methocarbamol (ROBAXIN) tablet 750 mg  750 mg Oral 4x Daily Ran Wheatley MD   750 mg at 02/07/22 1408    gabapentin (NEURONTIN) capsule 100 mg  100 mg Oral Q8H Clementine Duffy MD   100 mg at 02/07/22 0841    levETIRAcetam (KEPPRA) tablet 500 mg  500 mg Oral BID Ran Wheatley MD   500 mg at 02/07/22 9622        Allergies:    Iodine       Review of Systems   Constitutional: Negative for fever and unexpected weight change. HENT: Negative for trouble swallowing. Eyes: Positive for visual disturbance. Negative for photophobia. Respiratory: Negative for shortness of breath. Cardiovascular: Negative for chest pain. Gastrointestinal: Negative for abdominal pain. Endocrine: Negative for heat intolerance. Genitourinary: Negative for flank pain. Musculoskeletal: Negative for back pain, gait problem, myalgias and neck pain. Skin: Negative for wound. Neurological: Positive for headaches. Negative for weakness and numbness. Psychiatric/Behavioral: Negative for confusion. Physical Exam  Constitutional:       Appearance: Normal appearance. She is well-developed. HENT:      Head: Normocephalic and atraumatic. Eyes:      Extraocular Movements: Extraocular movements intact. Conjunctiva/sclera: Conjunctivae normal.      Pupils: Pupils are equal, round, and reactive to light. Cardiovascular:      Rate and Rhythm: Normal rate. Pulmonary:      Effort: Pulmonary effort is normal.   Abdominal:      General: There is no distension. Musculoskeletal:      Cervical back: Normal range of motion and neck supple. Skin:     General: Skin is warm and dry. Neurological:      Mental Status: She is alert. Comments: Alert and oriented x3  CN3-12 intact  Motor strength full  Sensation intact to light touch   Psychiatric:         Thought Content: Thought content normal.          /75   Pulse 64   Temp 97.6 °F (36.4 °C) (Temporal)   Resp 18   Ht 5' 3\" (1.6 m)   Wt 225 lb (102.1 kg)   SpO2 97%   BMI 39.86 kg/m²        Assessment:   Small parafalcine and tentorial subdural hematoma     Plan:  -No surgical intervention   -Serial neurological exams  -Repeat head CT scan stable  -No AP/AC  -Keppra x7 days  -Follow up in neurosurgery clinic in 4 weeks with repeat head CT scan      Electronically signed by Rubi Swanson PA-C on 2/7/2022 at 4:08 PM     I have interviewed and examined the patient and agree with above. I spent 30 minutes on medical decision making and counseling the patient on her condition and discussing the treatment plan. She has SDH and SAH. No intervention.   F/U in 4 weeks with head CT    Tessy Martinez MD

## 2022-02-07 NOTE — DISCHARGE SUMMARY
Physician Discharge Summary     Patient ID:  Elizabeth Fregoso  [id number]  61 y.o.  1965    Admit date: 2/6/2022    Discharge date and time: No discharge date for patient encounter. Admitting Physician: Adrianna Mendoza MD     Admission Diagnoses: Subarachnoid hemorrhage ([facility] [address] [I60.9]  Trauma [T14.90XA]  Subdural hematoma ([facility] [address] [E21.4Q1G]  Concussion without loss of consciousness, initial encounter [S06.0X0A]  Closed head injury, initial encounter [S09.90XA]  Hematoma of scalp, initial encounter [S00.03XA]  Fall due to slipping on ice or snow, initial encounter [W00. 9XXA]    Discharge Diagnoses: Active Problems:    Trauma    SDH (subdural hematoma) (HCC)    SAH (subarachnoid hemorrhage) (HCC)    Subarachnoid hemorrhage (HCC)  Resolved Problems:    * No resolved hospital problems. *      Admission Condition: fair    Discharged Condition: stable    Indication for Admission: Mechanical fall, SAH, SDH    Hospital Course/Procedures/Operation/treatments:   2/6: Patient presented as transfer from [address] [place] [address] after mechanical fall on ice in which she struck her head. CT Head showed SDH/SAH. Repeat CT head at 4 hour interval showed stable bleeds with a small new right posterior parafalcine bleed. Neurosurgery was consulted and repeat. 2/7: Patient doing well, pain controlled with medications still complaining of headache and blurred vision in right eye. CT Head # 3 pending  2/8: Headache and vision has improved overnight. Scored 17 out of 24 with PT would like to go home today.         Consults:   IP CONSULT TO TRAUMA SURGERY  IP CONSULT TO NEUROSURGERY    Significant Diagnostic Studies:   CT head without contrast    Result Date: 2/7/2022  EXAMINATION: CT OF THE HEAD WITHOUT CONTRAST  2/7/2022 1:40 am TECHNIQUE: CT of the head was performed without the administration of intravenous contrast. Dose modulation, iterative reconstruction, and/or weight based adjustment of the mA/kV was utilized to reduce the radiation dose to as low as reasonably achievable. COMPARISON: 02/06/2022 HISTORY: ORDERING SYSTEM PROVIDED HISTORY: SAH/SDH after trauma TECHNOLOGIST PROVIDED HISTORY: Reason for exam:->SAH/SDH after trauma Has a \"code stroke\" or \"stroke alert\" been called? ->No Decision Support Exception - unselect if not a suspected or confirmed emergency medical condition->Emergency Medical Condition (MA) What reading provider will be dictating this exam?->CRC FINDINGS: BRAIN/VENTRICLES: Stable right anterior and mid parafalcine subdural hematoma measuring up to 3 mm. New subdural hematoma measuring up to 2 mm involving the right posterior falx. Stable right and left anterior parafalcine subarachnoid hemorrhage. Stable layering of blood on the right and left side of the tentorium compared to prior study. The gray-white differentiation is maintained without evidence of an acute infarct. There is no evidence of hydrocephalus. ORBITS: The visualized portion of the orbits demonstrate no acute abnormality. SINUSES: The visualized paranasal sinuses and mastoid air cells demonstrate no acute abnormality. SOFT TISSUES/SKULL:  No large parietooccipital scalp hematoma. Stable right anterior and mid parafalcine subdural hematoma. Stable right and left anterior parafalcine subarachnoid hemorrhage. Stable layering of blood on the right and left side of the tentorium. New involving right posterior parafalcine subdural hematoma measuring up to 2 mm. CT HEAD WO CONTRAST    Result Date: 2/6/2022  EXAMINATION: CT OF THE HEAD WITHOUT CONTRAST  2/6/2022 9:03 pm TECHNIQUE: CT of the head was performed without the administration of intravenous contrast. Dose modulation, iterative reconstruction, and/or weight based adjustment of the mA/kV was utilized to reduce the radiation dose to as low as reasonably achievable. COMPARISON: None.  HISTORY: ORDERING SYSTEM PROVIDED HISTORY: Trauma TECHNOLOGIST PROVIDED HISTORY: Has a \"code stroke\" or \"stroke alert\" been called? ->No Reason for exam:->Trauma Decision Support Exception - unselect if not a suspected or confirmed emergency medical condition->Emergency Medical Condition (MA) What reading provider will be dictating this exam?->CRC FINDINGS: BRAIN/VENTRICLES: 3 mm right PICC anterior and middle parafalcine subdural hematoma. Small amount of subdural blood layering on the left side of the tentorium and possibly on the right side. Right right left anterior parafalcine subarachnoid hemorrhage. The gray-white differentiation is maintained without evidence of an acute infarct. There is no evidence of hydrocephalus. ORBITS: The visualized portion of the orbits demonstrate no acute abnormality. SINUSES: The visualized paranasal sinuses and mastoid air cells demonstrate no acute abnormality. SOFT TISSUES/SKULL:  No fractures. Occipital parietal scalp hematoma. Right anterior and right mid parafalcine subdural hematoma measuring up to 3 mm. Layering blood on the left side of the tentorium and possibly on the right side. Right left anterior parafalcine subarachnoid hemorrhage. Large parietooccipital midline scalp hematoma. Findings discussed on 02/06/2022 NP Dr. Annelle Claude at 9:50 p.m.     CT CERVICAL SPINE WO CONTRAST    Result Date: 2/6/2022  EXAMINATION: CT OF THE CERVICAL SPINE WITHOUT CONTRAST 2/6/2022 9:03 pm TECHNIQUE: CT of the cervical spine was performed without the administration of intravenous contrast. Multiplanar reformatted images are provided for review. Dose modulation, iterative reconstruction, and/or weight based adjustment of the mA/kV was utilized to reduce the radiation dose to as low as reasonably achievable. COMPARISON: None.  HISTORY: ORDERING SYSTEM PROVIDED HISTORY: fall TECHNOLOGIST PROVIDED HISTORY: Reason for exam:->fall Decision Support Exception - unselect if not a suspected or confirmed emergency medical condition->Emergency Medical Condition (MA) What reading provider will be dictating this exam?->CRC FINDINGS: BONES/ALIGNMENT: There is no acute fracture or traumatic malalignment. DEGENERATIVE CHANGES: No significant degenerative changes. SOFT TISSUES: There is no prevertebral soft tissue swelling. No acute abnormality of the cervical spine. Discharge Exam:  PHYSICAL EXAM   PSYCH: mood and affect normal, alert and oriented x 3  CONSTITUTIONAL: No apparent distress, comfortable  EYES: Sclera white, pupils equal round and reactive to light  ENMT:  Hearing normal, trachea midline, ears externally intact  RESP: Breath sounds were clear and equal with no rales, wheezes, or rhonchi. Respiratory effort was normal with no retractions or use of accessory muscles. CV: Heart sounds were normal with a regular rate and rhythm. No pedal edema  GI/ Abdomen: The abdomen was soft and non distended. There was no tenderness, guarding, rebound, or rigidity.        Disposition: home    In process/preliminary results:  Outstanding Order Results     No orders found from 1/8/2022 to 2/7/2022. Patient Instructions:   Current Discharge Medication List           Details   levETIRAcetam (KEPPRA) 500 MG tablet Take 1 tablet by mouth 2 times daily for 11 doses  Qty: 11 tablet, Refills: 0      oxyCODONE (ROXICODONE) 5 MG immediate release tablet Take 1 tablet by mouth every 6 hours as needed for Pain for up to 3 days.   Qty: 12 tablet, Refills: 0    Comments: Reduce doses taken as pain becomes manageable  Associated Diagnoses: Subdural hematoma (HCC)              Details   pantoprazole (PROTONIX) 40 MG tablet Take 1 tablet by mouth daily  Qty: 90 tablet, Refills: 3      rosuvastatin (CRESTOR) 5 MG tablet Take 1 tablet by mouth daily  Qty: 90 tablet, Refills: 3      amitriptyline (ELAVIL) 25 MG tablet Take 1 tablet by mouth nightly  Qty: 90 tablet, Refills: 3      busPIRone (BUSPAR) 5 MG tablet Take 1 tablet by mouth 2 times daily  Qty: 180 tablet, Refills: 1      metFORMIN (GLUCOPHAGE-XR) 500 MG extended release tablet 2 morning  2 dinner  Qty: 360 tablet, Refills: 3      desvenlafaxine succinate (PRISTIQ) 100 MG TB24 extended release tablet TAKE 1 TABLET DAILY  Qty: 90 tablet, Refills: 3               MILD TRAUMATIC BRAIN INJURY OR CONCUSSION  A mild traumatic brain injury (TBI) is one that causes some degree of injury to the brain causing symptoms ranging from a brief period of confusion to loss of consciousness (being knocked out). There is no major bruising or bleeding in the brain but symptoms can last from hours to months depending on the severity of the injury. Family or friends need to observe any change in behavior for the next 48 hours. Delayed effects from head injury do occur occasionally and can be due to slow bleeding or swelling around the surface of the brain. These effects may occur even if the x-rays/CT scans were normal.  Please observe the following symptoms during the next 24-48 hours. CALL 911 if:   Pupils (black part in the center of the eye) are unequal in size, and this is new.  Seizure (convulsion).  Not responding to others/won't wake up or very hard to wake up   Faints (passes out)   Vomiting more than 3 times    Notify the TRAUMA CLINIC if any of the following symptoms occur:   Severe headache -- Mild headache may last for days. Report worsening pain or uncontrolled pain with prescribed medicine.  Numbness, tingling or weakness -- Present in arms or legs; unsteady walking.  Eye Changes/light sensation -- Vision problems; blurred or double-vision; unequal sized pupils.  Nausea/Vomiting -- Episodes of vomiting may occur initially after a head injury. Persistent vomiting or difficulty taking medication by mouth.  Increased Sleepiness -- Difficulty waking from sleep with increased confusion.  Dizziness -- Does not go away or occurs repeatedly. Vomiting may accompany dizziness.    Drainage -- Clear fluid or blood from the nose and ears.   Fever -- Temperature over 101 degrees.  Neck Pain. The First Four Weeks  The symptoms below are common after a mild brain injury. They usually get better on their own within a few weeks:    feeling tired or ?low   problems falling or staying asleep   feeling confused, poor concentration, or slow to answer questions   feeling dizzy, poor balance, or poor coordination   being sensitive to light   being sensitive to sounds   ringing in the ears   a mild headache, sometimes with nausea and/or vomiting   being irritable, having mood swings, or feeling somewhat sad or down  Contact the TRAUMA CLINIC if your symptoms are affecting your everyday activities. Remember that letting yourself get too tired can make your symptoms worse. Listen to your body: if doing a certain activity increases your symptoms, take a break from that activity. Build up the amount of time you do an activity and stay under the threshold of symptoms. Long term Effects (Post-Concussive Syndrome)    Notify physician if any of the following persists longer than 2-4 weeks.  Difficulty with concentration or attention (easily distracted)   Frequent headaches   Memory problems    Sensitivity to light    Sleeping difficulties      There is a higher risk of having a more serious head injury if:   Previous history of head injury or concussion   Taking medicine that thins your blood, or have a bleeding disorder   Have other neurologic (brain) problems   Have difficulty walking or frequent falls   Active in high impact contact sports, like soccer or football. Activities   Stay away from activities that could cause another head injury (like sports), until the doctor says it's okay. A second blow to the head can cause more damage to the brain   Limit reading, television, video games, etc. the first 48 hours. Your brain needs to rest so that it can recover. You may find that it helps to take time off school or work. concerned about vision problems, slowed thinking, slowed reaction time, reduced attention or poor judgment.  Wear sunglasses even during winter if benito while driving. The bright light may induce a headache. Alcohol use/Drug use   Don't drink alcohol or use recreational drugs as they may make you feel worse and/or hide the warning signs. Follow-up:  Trauma Clinic: [phone] -- press option [address], [address] [facility]  Additional Discharge Instructions    Call [phone] for any questions/concerns and for follow up appointment in 2 weeks    Please follow the instructions checked below:    ACTIVITY INSTRUCTIONS:  Increase activity as tolerated     No driving until cleared by PCP  No driving while taking pain medication     MEDICATION INSTRUCTIONS:  Take medication as prescribed. When taking pain medications, you may experience dizziness or drowsiness. Do not drink alcohol or drive when taking these medications. You may take Ibuprofen (over the counter) as per directions for mild pain. You may experience constipation while taking pain medication - You may take over the counter stool softeners: docuscate (Colace) or sennosides S (Senokot - S).      WORK:    [x]You may not return to work until cleared by all consultants     Call physician for any of the following or for questions/concerns:   · Fever over 101° F    · Redness, swelling, hardness or warmth at the wound site (s)  · Unrelieved nausea/vomiting    · Foul smelling or cloudy drainage at the wound site (s)   · Unrelieved pain or increase in pain     · Increase in shortness of breath         Follow up:   [address] [place] [address]'[facility] [id number]  Schedule an appointment as soon as possible for a visit in 1 month  f/u subdural hematoma and subarachnoid hemorrhage    [address] [facility]  [address]  [phone]  Schedule an appointment as soon as possible for a visit in 2 weeks         Signed:  Massie Nageotte, DO  2/8/2022  6:44 AM

## 2022-02-07 NOTE — PROGRESS NOTES
[facility]  ATTENDING PHYSICIAN PROGRESS NOTE     I have examined the patient and  reviewed the record. I have reviewed all relevant labs and imaging data. The following summarizes my clinical findings and independent assessment. CC: fall on ice     S. Pt complains of headache and nausea. Pt remains alert and oriented x3    Review of Systems - History obtained from the patient  General ROS: pos for headache  Psychological ROS: negative  Ophthalmic ROS: negative  Allergy and Immunology ROS: negative  Hematological and Lymphatic ROS: negative  Endocrine ROS: negative  Breast ROS: negative  Respiratory ROS: negative  Cardiovascular ROS: negative  Gastrointestinal ROS: positive for - nausea  Genito-Urinary ROS: negative  Musculoskeletal ROS: negative        O.  PHYSICAL EXAM   PSYCH: mood and affect normal, alert and oriented x 3  CONSTITUTIONAL: No apparent distress, comfortable  EYES: Sclera white, pupils equal round and reactive to light  ENMT:  Hearing normal, trachea midline, ears externally intact  RESP: Breath sounds were clear and equal with no rales, wheezes, or rhonchi. Respiratory effort was normal with no retractions or use of accessory muscles. CV: Heart sounds were normal with a regular rate and rhythm. No pedal edema  GI/ Abdomen: The abdomen was soft and non distended. There was no tenderness, guarding, rebound, or rigidity. ASSESSMENT:  Active Problems:    Trauma    SDH (subdural hematoma) (HCC)    SAH (subarachnoid hemorrhage) (HCC)  Resolved Problems:    * No resolved hospital problems. *       PLAN:  Subdural/subarachnoid hemorrhage-repeat head CT shows slight worsening of bleed. Third head CT shows stability. We will consult neurosurgery. Start Keppra for early TBI seizure prophylaxis.   Continue serial neuro checks    Anxiety/depression-resume BuSpar and Elavil    Type 2 diabetes-continue sliding scale    Reassured patient that her symptoms of headache and nausea are secondary to head bleed. I explained that the symptoms will get better with time    PT OT      DVT Proph: JOSSYS/         Fawad Osborn MD, MIRL  2/7/2022  8:27 AM    NOTE: This report was transcribed using voice recognition software. Every effort was made to ensure accuracy; however, inadvertent computerized transcription errors may be present.

## 2022-02-07 NOTE — PROGRESS NOTES
PCP is Dominga Sanchez DO  Office notified of admission.       Electronically signed by Charlene Crabtree RN MSN APRN-NP [facility] NP  [facility] 2/7/2022 11:18 AM

## 2022-02-07 NOTE — PROGRESS NOTES
[address] [address]                                                  Patient Name: Oli Medina    MRN: [id number]    : 1965    Room: [address] [place], [place] [address]          Evaluating OT: Luz Barclay OTR/L; [id number]       Referring Provider: Luisa Godoy MD    Specific Provider Orders/Date: OT Eval and Treat 22       Diagnosis: Dirk Nissen on ice resulting in c/o headache & nausea - subarachnoid hemorrhage & subdural hematoma. Surgery: None this admission     Pertinent Medical History:  has a past medical history of Depression, Diabetes mellitus ([facility] [address] Gastritis, and Hyperlipidemia.        Recommended Adaptive Equipment: TBD     Precautions:  Fall Risk      Assessment of current deficits    [x] Functional mobility  [x]ADLs  [x] Strength               []Cognition    [x] Functional transfers   [x] IADLs         [x] Safety Awareness   [x]Endurance    [] Fine Coordination              [x] Balance      [] Vision/perception   []Sensation     []Gross Motor Coordination  [] ROM  [] Delirium                   [] Motor Control     OT PLAN OF CARE   OT POC based on physician orders, patient diagnosis and results of clinical assessment    Frequency/Duration 1-3 days/wk for 2 weeks PRN   Specific OT Treatment Interventions to include:   * Instruction/training on adapted ADL techniques and AE recommendations to increase functional independence within precautions       * Functional transfer/mobility training/DME recommendations for increased independence, safety, and fall prevention  * Patient/Family education to increase follow through with safety techniques and functional independence  * Recommendation of environmental modifications for increased safety with functional transfers/mobility and ADLs  * Therapeutic activities to facilitate/challenge dynamic balance, stand tolerance for increased safety and independence with ADLs    Home Living: Pt lives with mother in 2 story home with 3 steps to enter and no handrails. Bed & bath located on 2nd floor with full flight of stairs & 1 handrail. Bathroom setup: 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 UB Dressing SBA  Pt c/o dizziness  Independent    LB Dressing CGA  Independent    Bathing Contact Guard Assist  Simulated while seated for safety. Independent    Toileting Contact Guard Assist   Independent    Bed Mobility  Supine to sit:NT  Sit to supine:NT  Supine to sit: Independent   Sit to supine: Independent    Functional Transfers Sit to stand:CGA   Stand to sit:CGA  Stand pivot: CGA  Commode: CGA  Pt c/o 3/10 dizziness while completing commode transfer. Sit to stand:Independent  Stand to sit: Independent  Stand pivot: Independent  Commode: Independent    Functional Mobility CGA  within household distance. Pt use of IV pole to bathroom then ww from bathroom to maintain dynamic standing balance. Modified [place] with least restrictive AD   Balance Sitting:     Static - SBA     Dynamic - SBA  Standing: CGA  Sitting:     Static: Independent     Dynamic: Independent  Standing:  Mod I   Activity Tolerance Fair  Good   Visual/  Perceptual Glasses: yes    pt able to read clock on wall          Safety Fair  Good  during ADL completion     Hand Dominance Righ5   AROM (PROM) Strength Additional Info:  Goal:   RUE  WFL 4/5 grossly tested good  and wfl FMC/dexterity noted during ADL tasks   Improve overall RUE strength to 5/5 for participation in functional tasks       LUE [facility] 4/5 grossly tested Good  and wfl FMC/dexterity noted during ADL tasks   Improve overall LUE strength to 5/5 for participation in functional tasks       Hearing: [facility]   Sensation:  No c/o numbness or tingling   Tone: WFL   Edema: unremarkable    Comment: Cleared by RN to see pt. Upon arrival patient seated in bedside chair and agreeable to OT session. At end of session, patient seated in bedside chair with call light and phone within reach, all lines and tubes intact. Pt primary c/o dizziness during session. Overall patient demonstrated slight decreased independence and dynamic balance during completion of ADL/functional transfer/mobility tasks. Therapist facilitated functional tasks/functional mobility/functional transfers to improve pt safety awareness & effects of dizziness throughout ADLs/IADLs. Pt would benefit from continued skilled OT to increase safety and independence with completion of ADL/IADL tasks for functional independence and quality of life. Rehab Potential: Good for established goals     LTG: maximize independence with ADLs to return to OF    Patient and/or family were instructed on functional diagnosis, prognosis/goals and OT plan of care. Demonstrated fair understanding. Eval Complexity: Low   · History: Expanded chart review of medical records and additional review of physical, cognitive, or psychosocial history related to current functional performance  · Exam: 3+ performance deficits  · Assistance/Modification: Min/mod assistance or modifications required to perform tasks. May have comorbidities that affect occupational performance.     Evaluation time includes thorough review of current medical information, gathering information on past medical & social history & PLOF, completion of standardized testing, informal observation of tasks, consultation with other medical professions/disciplines, assessment of data & development of POC/goals. Time In: 9:35a  Time Out: 9:50a  Total Treatment Time: 0    Min Units   OT Eval Low 25219  x     OT Eval Medium 70174      OT Eval High 51567      OT Re-Eval [id number]       Therapeutic Ex 04369       Therapeutic Activities 00905       ADL/Self Care 42865       Orthotic Management 18398       Manual [id number]     Neuro Re-Ed 23982       Non-Billable Time          Evaluation Time additionally includes thorough review of current medical information, gathering information on past medical history/social history and prior level of function, interpretation of standardized testing/informal observation of tasks, assessment of data and development of plan of care and goals.               Salina Barnett OTR/L; [id number]

## 2022-02-07 NOTE — PROGRESS NOTES
hematoma measuring up to 2   mm. CT HEAD WO CONTRAST   Final Result   Right anterior and right mid parafalcine subdural hematoma measuring up to 3   mm. Layering blood on the left side of the tentorium and possibly on the right   side. Right left anterior parafalcine subarachnoid hemorrhage. Large parietooccipital midline scalp hematoma. Findings discussed on 02/06/2022 NP Dr. Clarissa Jha at 9:50 p.m.         CT CERVICAL SPINE WO CONTRAST   Final Result   No acute abnormality of the cervical spine. CT HEAD WO CONTRAST    (Results Pending)       PHYSICAL EXAM:     Central Nervous System  Loss of consciousness:  No    GCS:    Eye:  4 - Opens eyes on own  Motor:  6 - Follows simple motor commands  Verbal:  5 - Alert and oriented    Neuromuscular blockade: No  Pupil size:  Left 3 mm    Right 3 mm  Pupil reaction: Yes    Wiggles fingers: Left Yes Right Yes  Wiggles toes: Left Yes   Right Yes    Hand grasp:   Left  Present      Right  Present  Plantar flexion: Left  Present      Right   Present    PHYSICAL EXAM  General: No apparent distress. Sleeping comfortable. HEENT: Trachea midline, no masses, Pupils equal round reactive. Chest: Respiratory effort was normal with no retractions or use of accessory muscles. Cardiovascular: Extremities warm, well perfused, regular rate and rhythm   Abdomen:  Soft and non distended.   No tenderness, guarding, rebound, or rigidity   Extremities: Moves all 4 extremeties, No pedal edema     Spine:   Spine Tenderness ROM   Cervical 0/10 Normal   Thoracic 0 /10 Normal   Lumbar 0 /10 Normal     Musculoskeletal:    Joint Tenderness Swelling ROM   Right shoulder Absent absent normal   Left shoulder absent absent normal   Right elbow absent absent normal   Left elbow absent absent normal   Right wrist absent absent normal   Left wrist absent absent normal   Right hand grasp Absent absent normal   Left hand grasp absent absent normal   Right hip absent absent normal   Left hip absent absent normal   Right knee Absent absent normal   Left knee absent absent normal   Right ankle absent absent normal   Left ankle absent absent normal   Right foot Absent absent normal   Left foot absent absent normal       CONSULTS: Neurosurgery    PROCEDURES: none     INJURIES:      Active Problems:    Trauma  Resolved Problems:    * No resolved hospital problems. *        Assessment/Plan:       · Neuro:  GCS 15. Multimodal pain control: tylenol, lanre, dilaudid, neurontin, robaxin     Appreciate neurosurgery input, Keppra x7 days, elevate head of bed    Repeat CT Head 0530- last scan showed new R posterior para falcine bleed   · CV: HR near normal limits, no acute issues. Maintain BP <140   · Pulm: tolerating room air, ICS, pulmonary hygiene    · GI: NPO pending NSG evaluation   · Renal: no acute issues. Continue normal saline. · ID: afebrile, no acute issues    · Endocrine: no acute issues. Monitor glucose   · MSK: no acute issues. PT/OT when able       Bowel regime: miralax, senna   DVT prophylaxis: SCDs     Mouth/Eye care: per patient, .    Cabrera: none     Code status:    Full Code    Patient/Family update:  As available     Disposition:  Continue current care       Electronically signed by Nae Corona MD on 2/7/22 at 5:13 AM EST

## 2022-02-07 NOTE — H&P
TRAUMA HISTORY & PHYSICAL  Surgical Resident/Advance Practice Nurse  2/6/2022  10:06 PM    PRIMARY SURVEY    CHIEF COMPLAINT:  Trauma consult. Injury occurred just prior to arrival Patient suffered fall on ice and struck the back of her head. She states she did not los consciousness. States she is having some nausea and blurred vision since the fall but no emesis. Denies any other pain at this time. Denies anticoagulant use and NSAID use. Denies any light headedness, dizziness, difficulty walking. CT Head showed right sided para-falcine subarachnoid and subdural hemorrhage with no midline shift. CT C-spine was negative. AIRWAY:   Airway Normal  EMS ETT Absent  Noisy respirations Absent  Retractions: Absent  Vomiting/bleeding: Absent      BREATHING:    Midaxillary breath sound left:  Normal  Midaxillary breath sound right:  Normal    Cough sound intensity:  good   FiO2:  Room air    SMI  mL.       CIRCULATION:   Femerol pulse intensity: Strong  Palpebral conjunctiva: Pink     Vitals:    02/06/22 2039   BP: (!) 160/96   Pulse: 64   Resp: 18   Temp: 98 °F (36.7 °C)   SpO2: 99%       Vitals:    02/06/22 2039   BP: (!) 160/96   Pulse: 64   Resp: 18   Temp: 98 °F (36.7 °C)   SpO2: 99%   Weight: 225 lb (102.1 kg)   Height: 5' 3\" (1.6 m)        FAST EXAM: Deferred    Central Nervous System    GCS Initial 15 minutes   Eye  Motor  Verbal 4 - Opens eyes on own  6 - Follows simple motor commands  5 - Alert and oriented 4 - Opens eyes on own  6 - Follows simple motor commands  5 - Alert and oriented     Neuromuscular blockade: No  Pupil size:  Left 3 mm    Right 3 mm  Pupil reaction: Yes    Wiggles fingers: Left Yes Right Yes  Wiggles toes: Left Yes   Right Yes    Hand grasp:   Left  Present      Right  Present  Plantar flexion: Left  Present      Right   Present    Loss of consciousness:  No    History Obtained From:  Patient & EMS  Private Medical Doctor: Marisela Zhong DO      Pre-exisiting Medical History: yes    Conditions:   Past Medical History:   Diagnosis Date    Depression     Gastritis     Hyperlipidemia          Medications:   Current Facility-Administered Medications   Medication Dose Route Frequency Provider Last Rate Last Admin    levETIRAcetam (KEPPRA) 1000 mg/100 mL IVPB  1,000 mg IntraVENous Once Yessica Plain, APRN - CNP        ondansetron (ZOFRAN) injection 4 mg  4 mg IntraVENous Once Yessica Plain, APRN - CNP         Current Outpatient Medications   Medication Sig Dispense Refill    pantoprazole (PROTONIX) 40 MG tablet Take 1 tablet by mouth daily 90 tablet 3    rosuvastatin (CRESTOR) 5 MG tablet Take 1 tablet by mouth daily 90 tablet 3    amitriptyline (ELAVIL) 25 MG tablet Take 1 tablet by mouth nightly 90 tablet 3    busPIRone (BUSPAR) 5 MG tablet Take 1 tablet by mouth 2 times daily 180 tablet 1    metFORMIN (GLUCOPHAGE-XR) 500 MG extended release tablet 2 morning  2 dinner 360 tablet 3    desvenlafaxine succinate (PRISTIQ) 100 MG TB24 extended release tablet TAKE 1 TABLET DAILY 90 tablet 3         Allergies: Allergies   Allergen Reactions    Iodine          Social History:   Tobacco use:  none  Alcohol use:  Glass of wine with dinner   Illicit drug use:  no history of illicit drug use    Past Surgical History:  History reviewed. No pertinent surgical history. Anticoagulant use: None  Antiplatelet use:   None    NSAID use in last 72 hours: no  Taken PCN in past:  yes  Last food/drink: this morning   Last tetanus: unknown     Family History:   No family history of anesthesia complications    Complaints:   Head:  Pain, mild  Neck:   None  Chest:   None  Back:   None  Abdomen:   None  Extremities:   None  Comments:     Review of systems:  All negative unless otherwise noted.         SECONDARY SURVEY  Head/scalp: Atraumatic    Face: Atraumatic    Eyes/ears/nose: Atraumatic    Pharynx/mouth: Atraumatic    Neck: Atraumatic     Cervical spine tenderness:   Cervical collar not indicated  Pain:  none  ROM:  Full    Chest wall:  Atraumatic    Heart:  Regular rate & rhythm    Abdomen: Atraumatic. Soft ND  Tenderness:  none    Pelvis: Atraumatic  Tenderness: none    Thoracolumbar spine: Atraumatic  Tenderness:  none    Genitourinary:  Atraumatic. No blood or urine noted    Rectum: Atraumatic. No blood noted. Perineum: Atraumatic. No blood or urine noted. Extremities:   Sensory normal  Motor normal    Distal Pulses  Left arm normal  Right arm normal  Left leg normal  Right leg normal    Capillary refill  Left arm normal  Right arm normal  Left leg normal  Right leg normal    Procedures in ED:  none    In the event of Emergency Blood Transfusion:  Due to the critical condition of this patient, I request the immediate release of blood products for emergency transfusion secondary to shock. I understand the increased risks incurred by the lack of complete transfusion testing.       Radiology: CT Head, CT C spine     Consultations:  Neurosurgery    Admission/Diagnosis: SDH, [address]     Plan of Treatment:  Admit to telemetry   Keppra x 7 days  BP control sbp <140  Pain and nausea control as needed   Repeat CT Head 0130  NPO for NSG assessment   Appreciate Neurosurgery recommendations   Tertiary exam   UDS      Plan discussed with Dr. El Gavin    at 2/6/2022 on 10:06 PM    Electronically signed by Siri Smith MD on 2/6/2022 at 10:06 PM

## 2022-02-07 NOTE — PROGRESS NOTES
Physical Therapy  Physical Therapy Initial Assessment     Name: Anuradha Renterai  : 1965  MRN: [id number]      Date of Service: 2022    Evaluating PT:  Joselorui Sainz, PT, DPT [id number]    Room #:  9224/7564-C  Diagnosis:  Subarachnoid hemorrhage ([facility] [address] [I60.9]  Trauma [T14.90XA]  Subdural hematoma ([facility] [address] [M20.0W5L]  Concussion without loss of consciousness, initial encounter [S06.0X0A]  Closed head injury, initial encounter [S09.90XA]  Hematoma of scalp, initial encounter [S00.03XA]  Fall due to slipping on ice or snow, initial encounter [W00. 9XXA]  PMHx/PSHx:  Depression, HLD, gastritis, DM  Procedure/Surgery:  None  Precautions:  Fall risk, bed alarm  Equipment Needs:  None  Equipment Owned: SPC, [employer]    SUBJECTIVE:    Pt lives with mother in a 2 story home with 3 stair(s) to enter and 1 rail(s). There is a full flight of stairs with 1 rail to 2nd floor. Pt ambulated with no AD PTA. OBJECTIVE:   Initial Evaluation  Date: 2022 Treatment Short Term/ Long Term   Goals   AM-PAC 6 Clicks 42/40     Was pt agreeable to Eval/treatment? Yes     Does pt have pain? Mild head     Bed Mobility  Rolling: NT  Supine to sit: SBA  Sit to supine: NT  Scooting: SBA to EOB  Rolling: Independent  Supine to sit: Independent  Sit to supine: Independent  Scooting: Independent   Transfers Sit to stand: SBA  Stand to sit: SBA  Stand pivot: SBA  Sit to stand: Independent  Stand to sit:  Independent  Stand pivot: Independent   Ambulation    60 feet holding onto IV pole with CGA  80 feet with no AD with CGA  150 feet with no AD Independent   Stair negotiation: ascended and descended  NT  8 steps with 1 rail Paulo   ROM BUE:  See OT note  BLE:  WFL     Strength BUE:  See OT note  BLE:  Grossly 5/5     Balance Sitting EOB:  Sup  Dynamic Standing:  CGA with no AD  Sitting EOB:  Independent  Dynamic Standing:  Independent     Pt is A & O x 4  Sensation:  Pt denies numbness and tingling to extremities  Edema:  Unremarkable  Coordination: BLE intact (heel-shin test)    Vitals:   HR 70, SpO2 96%, /82 following activity. Patient education  Pt educated on role of PT, safety during functional mobility, use of call light for assistance. Patient response to education:   Pt verbalized understanding Pt demonstrated skill Pt requires further education in this area   Yes Yes Yes     ASSESSMENT:    Conditions Requiring Skilled Therapeutic Intervention:    []Decreased strength     []Decreased ROM  [x]Decreased functional mobility  [x]Decreased balance   [x]Decreased endurance   []Decreased posture  []Decreased sensation  []Decreased coordination   []Decreased vision  [x]Decreased safety awareness   [x]Increased pain       Comments:  Patient supine in bed upon arrival; agreeable to PT evaluation. Required increased time to perform supine to sit. Tolerated sitting EOB for extended period of time without balance loss. Performed multiple sit <> stand transfers during session using no AD. Ambulated with decreased speed, mild unsteadiness. Activity limited by dizziness; patient requesting to limit ambulation distance due to dizziness; reported that dizziness has recently been persistent and has recently been present both at rest and during activity. Patient left sitting in bedside chair with call light in reach. Patient would benefit from continued skilled PT services to address functional deficits. Treatment:  Patient practiced and was instructed in the following treatment:     Bed mobility - verbal cues to facilitate proper positioning   Static sitting - performed to promote upright tolerance, postural awareness, and balance maintenance   Functional transfers - verbal cues to facilitate proper positioning and sequencing, particularly related to hand and foot placement   Gait training - verbal cues to facilitate proper positioning; physical assistance provided during activity    Pt's/ family goals   1.  Return home    Prognosis is good for reaching above PT goals. Patient and or family understand(s) diagnosis, prognosis, and plan of care. Yes    PHYSICAL THERAPY PLAN OF CARE:    PT POC is established based on physician order and patient diagnosis     Referring provider/PT Order:    02/06/22 2330  PT evaluation and treat  Start:  02/06/22 2330,   End:  02/06/22 2330,   ONE TIME,   Standing Count:  1 Occurrences,   Sydney Garcia MD       Diagnosis:  Subarachnoid hemorrhage ([address] [I60.9]  Trauma [T14.90XA]  Subdural hematoma ([address] [F12.3R1Q]  Concussion without loss of consciousness, initial encounter [S06.0X0A]  Closed head injury, initial encounter [S09.90XA]  Hematoma of scalp, initial encounter [S00.03XA]  Fall due to slipping on ice or snow, initial encounter [W00. 9XXA]  Specific instructions for next treatment:  Continue to facilitate safe performance of functional mobility; progress as appropriate. Current Treatment Recommendations:     [] Strengthening to improve independence with functional mobility   [] ROM to improve independence with functional mobility   [x] Balance Training to improve static/dynamic balance and to reduce fall risk  [x] Endurance Training to improve activity tolerance during functional mobility   [x] Transfer Training to improve safety and independence with all functional transfers   [x] Gait Training to improve gait mechanics, endurance and assess need for appropriate assistive device  [x] Stair Training in preparation for safe discharge home and/or into the community   [x] Positioning to prevent skin breakdown and contractures  [x] Safety and Education Training   [x] Patient/Caregiver Education   [] HEP  [] Other     PT long term treatment goals are located in above grid    Frequency of treatments: 2-5x/week x 1-2 weeks.     Time in  0855  Time out  0916    Total Treatment Time  10 minutes     Evaluation Time includes thorough review of current medical information, gathering information on past medical history/social history and prior level of function, completion of standardized testing/informal observation of tasks, assessment of data and education on plan of care and goals.     CPT codes:  [x] Low Complexity PT evaluation 71346  [] Moderate Complexity PT evaluation 75893  [] High Complexity PT evaluation 46574  [] PT Re-evaluation 21413  [] Gait training 39648 0 minutes  [] Manual therapy 72784 0 minutes  [x] Therapeutic activities 20143 10 minutes  [] Therapeutic exercises 44978 0 minutes  [] Neuromuscular reeducation 05953 0 minutes     Rachel Jaime, PT, DPT  [id number]

## 2022-02-07 NOTE — ED PROVIDER NOTES
ED physician. HPI:  2/6/22, Time: 8:57 PM HOANG Atwood is a 61 y.o. female presenting to the ED for mechanical fall with head injury. Patient presents to the emergency department after having a slip and fall on ice prior to arrival.  Patient reports that she landed and struck the back of her head. She denies loss of consciousness, she is not on any anticoagulant , she does report feeling dizzy as well as her head hurting her. She also denies feeling weak or dizzy prior to this fall. Patient with hematoma with small overlying abrasion to the posterior parietal aspect of the scalp. She denies any other areas of injury including no noted injury to her upper or lower extremities as well as none noted to her lumbar spine, hip or pelvic region. Patient denies take anything for pain relief. Denying any visual disturbance. No unusual numbness, tingling or paresthesia noted. Symptoms moderate in severity and persistent. Review of Systems:   A complete review of systems was performed and pertinent positives and negatives are stated within HPI, all other systems reviewed and are negative.          --------------------------------------------- PAST HISTORY ---------------------------------------------  Past Medical History:  has a past medical history of Depression, Gastritis, and Hyperlipidemia. Past Surgical History:  has no past surgical history on file. Social History:  reports that she has never smoked. She has never used smokeless tobacco.    Family History: family history includes Diabetes in her father; Heart Disease in her father. The patients home medications have been reviewed.     Allergies: Iodine    -------------------------------------------------- RESULTS -------------------------------------------------  All laboratory and radiology results have been personally reviewed by tyroneelf   LABS:  Results for orders placed or performed during the hospital encounter of 02/06/22   CBC Auto Differential   Result Value Ref Range    WBC 9.6 4.5 - 11.5 E9/L    RBC 4.12 3.50 - 5.50 E12/L    Hemoglobin 12.7 11.5 - 15.5 g/dL    Hematocrit 37.2 34.0 - 48.0 %    MCV 90.3 80.0 - 99.9 fL    MCH 30.8 26.0 - 35.0 pg    MCHC 34.1 32.0 - 34.5 %    RDW 13.2 11.5 - 15.0 fL    Platelets 661 393 - 798 E9/L    MPV 9.0 7.0 - 12.0 fL    Neutrophils % 72.0 43.0 - 80.0 %    Immature Granulocytes % 0.4 0.0 - 5.0 %    Lymphocytes % 15.2 (L) 20.0 - 42.0 %    Monocytes % 8.2 2.0 - 12.0 %    Eosinophils % 3.6 0.0 - 6.0 %    Basophils % 0.6 0.0 - 2.0 %    Neutrophils Absolute 6.92 1.80 - 7.30 E9/L    Immature Granulocytes # 0.04 E9/L    Lymphocytes Absolute 1.46 (L) 1.50 - 4.00 E9/L    Monocytes Absolute 0.79 0.10 - 0.95 E9/L    Eosinophils Absolute 0.35 0.05 - 0.50 E9/L    Basophils Absolute 0.06 0.00 - 0.20 E9/L   Comprehensive Metabolic Panel   Result Value Ref Range    Sodium 140 132 - 146 mmol/L    Potassium 4.0 3.5 - 5.0 mmol/L    Chloride 103 98 - 107 mmol/L    CO2 26 22 - 29 mmol/L    Anion Gap 11 7 - 16 mmol/L    Glucose 118 (H) 74 - 99 mg/dL    BUN 15 6 - 20 mg/dL    CREATININE 1.0 0.5 - 1.0 mg/dL    GFR Non-African American 57 >=60 mL/min/1.73    GFR African American >60     Calcium 9.7 8.6 - 10.2 mg/dL    Total Protein 7.6 6.4 - 8.3 g/dL    Albumin 4.6 3.5 - 5.2 g/dL    Total Bilirubin 0.3 0.0 - 1.2 mg/dL    Alkaline Phosphatase 87 35 - 104 U/L    ALT 31 0 - 32 U/L    AST 22 0 - 31 U/L   Protime-INR   Result Value Ref Range    Protime 11.0 9.3 - 12.4 sec    INR 1.0    APTT   Result Value Ref Range    aPTT 34.4 24.5 - 35.1 sec       RADIOLOGY:  Interpreted by Radiologist.  CT HEAD WO CONTRAST   Final Result   Right anterior and right mid parafalcine subdural hematoma measuring up to 3   mm. Layering blood on the left side of the tentorium and possibly on the right   side. Right left anterior parafalcine subarachnoid hemorrhage. Large parietooccipital midline scalp hematoma.       Findings discussed on 02/06/2022 NP Dr. Lotus Locke at 9:50 p.m.         CT CERVICAL SPINE WO CONTRAST   Final Result   No acute abnormality of the cervical spine.             ------------------------- NURSING NOTES AND VITALS REVIEWED ---------------------------   The nursing notes within the ED encounter and vital signs as below have been reviewed. BP (!) 160/96   Pulse 64   Temp 98 °F (36.7 °C)   Resp 18   Ht 5' 3\" (1.6 m)   Wt 225 lb (102.1 kg)   SpO2 99%   BMI 39.86 kg/m²   Oxygen Saturation Interpretation: Normal      ---------------------------------------------------PHYSICAL EXAM--------------------------------------      Constitutional/General: Alert and oriented x3, moderately uncomfortable  Head: Normocephalic and atraumatic  Eyes: PERRL, EOMI  Mouth: Oropharynx clear, handling secretions, no trismus  Neck: Supple, full ROM,   Pulmonary: Lungs clear to auscultation bilaterally, no wheezes, rales, or rhonchi. Not in respiratory distress  Cardiovascular:  Regular rate and rhythm, no murmurs, gallops, or rubs. 2+ distal pulses  Abdomen: Soft, non tender, non distended,   Extremities: Moves all extremities x 4. Warm and well perfused  Skin: warm and dry without rash, scalp hematoma with overlying abrasion to the posterior occipital region. Neurologic: GCS 15, cranial nerves II through XII grossly intact. No acute neurovascular deficit noted.   Speech clear and coherent strength strong and equal bilaterally  Psych: Normal Affect      ------------------------------ ED COURSE/MEDICAL DECISION MAKING----------------------  Medications   oxyCODONE-acetaminophen (PERCOCET) 5-325 MG per tablet 1 tablet (1 tablet Oral Given 2/6/22 2127)   bacitracin zinc ointment ( Topical Given 2/6/22 2131)   diptheria-tetanus toxoids [facility]) 2-2 LF/0.5ML injection 0.5 mL (0.5 mLs IntraMUSCular Given 2/6/22 2128)   levETIRAcetam (KEPPRA) 1000 mg/100 mL IVPB (0 mg IntraVENous Stopped 2/6/22 2310)   ondansetron (ZOFRAN) injection 4 mg (4 mg IntraVENous Given 2/6/22 6875)         ED COURSE:       Medical Decision Making: Plan will be for imaging will medicate for symptom relief. Patient also provided with tetanus immunization  Will medicate for symptom relief. Will rule out neurovascular injury including subdural hematoma, subarachnoid hemorrhage versus closed head injury/concussion. CT scan of brain resulted showing a right anterior and right mid parafalcine subdural hematoma measuring up to 3 mm. There is layering blood on the left side of the tentorium and possibly on the right side. Right and left anterior parafalcine subarachnoid hemorrhage and also a large occipital midline scalp hematoma. CT cervical spine showing no acute abnormality. Patient was brought back immediately to ED room 12, myself as well as the emergency room attending made patient aware of findings from CAT scan. She does  complain of dizziness as well as nausea, will medicate for symptoms, will obtain labs and consult trauma surgery. PT PTT INR all negative, chemistry panel unremarkable, CBC negative. Patient will be admitted by trauma services. Patient resting comfortably at this time. She did express understanding for admission. Counseling: The emergency provider has spoken with the patient and discussed todays results, in addition to providing specific details for the plan of care and counseling regarding the diagnosis and prognosis. Questions are answered at this time and they are agreeable with the plan.      --------------------------------- IMPRESSION AND DISPOSITION ---------------------------------    IMPRESSION  1. Subarachnoid hemorrhage (HCC)    2. Closed head injury, initial encounter    3. Hematoma of scalp, initial encounter    4. Concussion without loss of consciousness, initial encounter    5. Subdural hematoma (HCC)    6.  Fall due to slipping on ice or snow, initial encounter        DISPOSITION  Disposition: Admission  Patient condition is good      NOTE: This report was transcribed using voice recognition software.  Every effort was made to ensure accuracy; however, inadvertent computerized transcription errors may be present     KYLE Weinberg - MICAH  02/06/22 3256

## 2022-02-08 VITALS
DIASTOLIC BLOOD PRESSURE: 79 MMHG | WEIGHT: 225 LBS | HEART RATE: 80 BPM | TEMPERATURE: 97.4 F | OXYGEN SATURATION: 99 % | HEIGHT: 63 IN | SYSTOLIC BLOOD PRESSURE: 130 MMHG | BODY MASS INDEX: 39.87 KG/M2 | RESPIRATION RATE: 17 BRPM

## 2022-02-08 LAB
ANION GAP SERPL CALCULATED.3IONS-SCNC: 10 MMOL/L (ref 7–16)
BASOPHILS ABSOLUTE: 0.04 E9/L (ref 0–0.2)
BASOPHILS RELATIVE PERCENT: 0.8 % (ref 0–2)
BUN BLDV-MCNC: 10 MG/DL (ref 6–20)
CALCIUM SERPL-MCNC: 8.7 MG/DL (ref 8.6–10.2)
CHLORIDE BLD-SCNC: 106 MMOL/L (ref 98–107)
CO2: 25 MMOL/L (ref 22–29)
CREAT SERPL-MCNC: 0.9 MG/DL (ref 0.5–1)
EOSINOPHILS ABSOLUTE: 0.28 E9/L (ref 0.05–0.5)
EOSINOPHILS RELATIVE PERCENT: 5.3 % (ref 0–6)
GFR AFRICAN AMERICAN: >60
GFR NON-AFRICAN AMERICAN: >60 ML/MIN/1.73
GLUCOSE BLD-MCNC: 133 MG/DL (ref 74–99)
HCT VFR BLD CALC: 35.7 % (ref 34–48)
HEMOGLOBIN: 12 G/DL (ref 11.5–15.5)
IMMATURE GRANULOCYTES #: 0.03 E9/L
IMMATURE GRANULOCYTES %: 0.6 % (ref 0–5)
LYMPHOCYTES ABSOLUTE: 1.29 E9/L (ref 1.5–4)
LYMPHOCYTES RELATIVE PERCENT: 24.2 % (ref 20–42)
MCH RBC QN AUTO: 30.8 PG (ref 26–35)
MCHC RBC AUTO-ENTMCNC: 33.6 % (ref 32–34.5)
MCV RBC AUTO: 91.5 FL (ref 80–99.9)
METER GLUCOSE: 136 MG/DL (ref 74–99)
MONOCYTES ABSOLUTE: 0.55 E9/L (ref 0.1–0.95)
MONOCYTES RELATIVE PERCENT: 10.3 % (ref 2–12)
NEUTROPHILS ABSOLUTE: 3.14 E9/L (ref 1.8–7.3)
NEUTROPHILS RELATIVE PERCENT: 58.8 % (ref 43–80)
PDW BLD-RTO: 13.6 FL (ref 11.5–15)
PLATELET # BLD: 306 E9/L (ref 130–450)
PMV BLD AUTO: 9.2 FL (ref 7–12)
POTASSIUM REFLEX MAGNESIUM: 4.2 MMOL/L (ref 3.5–5)
RBC # BLD: 3.9 E12/L (ref 3.5–5.5)
SODIUM BLD-SCNC: 141 MMOL/L (ref 132–146)
WBC # BLD: 5.3 E9/L (ref 4.5–11.5)

## 2022-02-08 PROCEDURE — 82962 GLUCOSE BLOOD TEST: CPT

## 2022-02-08 PROCEDURE — 80048 BASIC METABOLIC PNL TOTAL CA: CPT

## 2022-02-08 PROCEDURE — 85025 COMPLETE CBC W/AUTO DIFF WBC: CPT

## 2022-02-08 PROCEDURE — 99238 HOSP IP/OBS DSCHRG MGMT 30/<: CPT | Performed by: SURGERY

## 2022-02-08 PROCEDURE — 6370000000 HC RX 637 (ALT 250 FOR IP)

## 2022-02-08 PROCEDURE — 36415 COLL VENOUS BLD VENIPUNCTURE: CPT

## 2022-02-08 RX ORDER — OXYCODONE HYDROCHLORIDE 5 MG/1
5 TABLET ORAL EVERY 6 HOURS PRN
Qty: 12 TABLET | Refills: 0 | Status: SHIPPED | OUTPATIENT
Start: 2022-02-08 | End: 2022-02-11

## 2022-02-08 RX ORDER — LEVETIRACETAM 500 MG/1
500 TABLET ORAL 2 TIMES DAILY
Qty: 11 TABLET | Refills: 0 | Status: SHIPPED | OUTPATIENT
Start: 2022-02-08 | End: 2022-02-22 | Stop reason: ALTCHOICE

## 2022-02-08 RX ADMIN — DESVENLAFAXINE 100 MG: 50 TABLET, EXTENDED RELEASE ORAL at 08:22

## 2022-02-08 RX ADMIN — ACETAMINOPHEN 650 MG: 325 TABLET ORAL at 10:59

## 2022-02-08 RX ADMIN — LEVETIRACETAM 500 MG: 500 TABLET, FILM COATED ORAL at 08:21

## 2022-02-08 RX ADMIN — GABAPENTIN 100 MG: 100 CAPSULE ORAL at 06:41

## 2022-02-08 RX ADMIN — BUSPIRONE HYDROCHLORIDE 5 MG: 5 TABLET ORAL at 08:22

## 2022-02-08 RX ADMIN — PANTOPRAZOLE SODIUM 40 MG: 40 TABLET, DELAYED RELEASE ORAL at 08:21

## 2022-02-08 ASSESSMENT — PAIN SCALES - GENERAL
PAINLEVEL_OUTOF10: 5
PAINLEVEL_OUTOF10: 0

## 2022-02-08 NOTE — PLAN OF CARE
Problem: Falls - Risk of:  Goal: Will remain free from falls  Description: Will remain free from falls  2/8/2022 1113 by Alexis Jean RN  Outcome: Completed  2/8/2022 0256 by Louann Gabriel RN  Outcome: Met This Shift  Goal: Absence of physical injury  Description: Absence of physical injury  2/8/2022 1113 by Alexis Jean RN  Outcome: Completed  2/8/2022 0256 by Louann Gabriel RN  Outcome: Met This Shift     Problem: Discharge Planning:  Goal: Discharged to appropriate level of care  Description: Discharged to appropriate level of care  2/8/2022 1113 by Alexis Jean RN  Outcome: Completed  2/8/2022 0256 by Louann Gabriel RN  Outcome: Ongoing     Problem: Health Maintenance - Impaired:  Goal: Ability to perform activities of daily living will improve  Description: Ability to perform activities of daily living will improve  2/8/2022 1113 by Alexis Jean RN  Outcome: Completed  2/8/2022 0256 by Louann Gabriel RN  Outcome: Met This Shift  Goal: Able to sleep without medication for appropriate length of time  Description: Able to sleep without medication for appropriate length of time  2/8/2022 1113 by Alexis Jean RN  Outcome: Completed  2/8/2022 0256 by Louann Gabriel RN  Outcome: Met This Shift  Goal: Maintenance of adequate nutrition will improve  Description: Maintenance of adequate nutrition will improve  2/8/2022 1113 by Alexis Jean RN  Outcome: Completed  2/8/2022 0256 by Louann Gabriel RN  Outcome: Met This Shift     Problem: Mood - Altered:  Goal: Mood stable  Description: Mood stable  2/8/2022 1113 by Alexis Jean RN  Outcome: Completed  2/8/2022 0256 by Louann Gabriel RN  Outcome: Met This Shift     Problem: Self-Esteem - Low:  Goal: Demonstrates positive self-esteem  Description: Demonstrates positive self-esteem  2/8/2022 1113 by Alexis Jean RN  Outcome: Completed  2/8/2022 0256 by Louann Gabriel RN  Outcome: Met This Shift     Problem: Cerebrospinal Fluid Leakage - Risk Of:  Goal: Demonstration of organized thought processes  Description: Demonstration of organized thought processes  2/8/2022 1113 by Pebbles Still RN  Outcome: Completed  2/8/2022 0256 by Robbi Rao RN  Outcome: Met This Shift     Problem: Violence - Risk of, Self/Other-Directed:  Goal: Knowledge of developmental care interventions  Description: Absence of violence  2/8/2022 1113 by Pebbles Still RN  Outcome: Completed  2/8/2022 0256 by Robbi Rao RN  Outcome: Met This Shift     Problem: Neurological  Goal: Maximum potential motor/sensory/cognitive function  2/8/2022 1113 by Pebbles Still RN  Outcome: Completed  2/8/2022 0256 by Robbi Rao RN  Outcome: Met This Shift

## 2022-02-08 NOTE — PROGRESS NOTES
[facility]  ATTENDING PHYSICIAN PROGRESS NOTE     I have examined the patient and  reviewed the record. I have reviewed all relevant labs and imaging data. The following summarizes my clinical findings and independent assessment. CC: fall on ice     S. Pt feels better. Headache and nausea are much improved. Review of Systems - History obtained from the patient  General ROS: pos for headache  Psychological ROS: negative  Ophthalmic ROS: negative  Allergy and Immunology ROS: negative  Hematological and Lymphatic ROS: negative  Endocrine ROS: negative  Breast ROS: negative  Respiratory ROS: negative  Cardiovascular ROS: negative  Gastrointestinal ROS: positive for - nausea  Genito-Urinary ROS: negative  Musculoskeletal ROS: negative        O.  PHYSICAL EXAM   PSYCH: mood and affect normal, alert and oriented x 3  CONSTITUTIONAL: No apparent distress, comfortable  EYES: Sclera white, pupils equal round and reactive to light  ENMT:  Hearing normal, trachea midline, ears externally intact  RESP: Breath sounds were clear and equal with no rales, wheezes, or rhonchi. Respiratory effort was normal with no retractions or use of accessory muscles. CV: Heart sounds were normal with a regular rate and rhythm. No pedal edema  GI/ Abdomen: The abdomen was soft and non distended. There was no tenderness, guarding, rebound, or rigidity. ASSESSMENT:  Active Problems:    Trauma    SDH (subdural hematoma) (HCC)    SAH (subarachnoid hemorrhage) (HCC)    Subarachnoid hemorrhage (HCC)  Resolved Problems:    * No resolved hospital problems. *       PLAN:  Subdural/subarachnoid hemorrhage-Seen by Dr Leora Sanchez. Follow up in clinic in 4 weeks    Anxiety/depression-continueBuSpar and Elavil    Type 2 diabetes-continue sliding scale    Heachache/ nausea--improved.  Secondary to TBI    PT OT  Discharge home today    DVT Proph: SCDS/         Early MD Ashwin, GABRIELLE  2/8/2022  8:34 AM    NOTE: This report was transcribed using voice recognition software. Every effort was made to ensure accuracy; however, inadvertent computerized transcription errors may be present.

## 2022-02-08 NOTE — CARE COORDINATION
Patient to discharge to home today. Orders noted. No new needs identified. AVS updated appropriately. Pharmacy set to patient's preferred pharmacy.      Deepa Agee RN.  Jennifer Del Rosario  [phone]

## 2022-02-09 ENCOUNTER — TELEPHONE (OUTPATIENT)
Dept: PRIMARY CARE CLINIC | Age: 57
End: 2022-02-09

## 2022-02-09 NOTE — TELEPHONE ENCOUNTER
[facility] Transitions Initial Follow Up Call    Outreach made within 2 business days of discharge: Yes    Patient: Chi Gresham Patient : 1965     Discharge Date: 22       Spoke with: Katy Avila     Discharge department/facility: [facility]     TCM Interactive Patient Contact:  Was patient able to fill all prescriptions: Yes  Was patient instructed to bring all medications to the follow-up visit: Yes  Is patient taking all medications as directed in the discharge summary?  Yes  Does patient understand their discharge instructions: Yes  Does patient have questions or concerns that need addressed prior to 7-14 day follow up office visit: no    Scheduled appointment with PCP within 7-14 days    Follow Up  Future Appointments   Date Time Provider Sofy Fernandez   2022 10:15 AM Lennox Ser, DO [facility]   3/9/2022  1:15 PM MONET Kaur [facility]   2022  8:00 AM Lennox Ser, DO [facility]       Woody Eden

## 2022-02-11 ENCOUNTER — OFFICE VISIT (OUTPATIENT)
Dept: PRIMARY CARE CLINIC | Age: 57
End: 2022-02-11
Payer: COMMERCIAL

## 2022-02-11 VITALS
SYSTOLIC BLOOD PRESSURE: 120 MMHG | TEMPERATURE: 98.9 F | OXYGEN SATURATION: 98 % | DIASTOLIC BLOOD PRESSURE: 70 MMHG | HEART RATE: 73 BPM

## 2022-02-11 DIAGNOSIS — I10 ESSENTIAL HYPERTENSION: ICD-10-CM

## 2022-02-11 DIAGNOSIS — S06.5XAA SDH (SUBDURAL HEMATOMA): ICD-10-CM

## 2022-02-11 DIAGNOSIS — E11.9 TYPE 2 DIABETES MELLITUS WITHOUT COMPLICATION, WITHOUT LONG-TERM CURRENT USE OF INSULIN (HCC): ICD-10-CM

## 2022-02-11 DIAGNOSIS — E78.5 HYPERLIPIDEMIA, UNSPECIFIED HYPERLIPIDEMIA TYPE: ICD-10-CM

## 2022-02-11 DIAGNOSIS — I60.9 SAH (SUBARACHNOID HEMORRHAGE) (HCC): Primary | ICD-10-CM

## 2022-02-11 DIAGNOSIS — S06.5XAA SDH (SUBDURAL HEMATOMA): Primary | ICD-10-CM

## 2022-02-11 PROCEDURE — 99496 TRANSJ CARE MGMT HIGH F2F 7D: CPT | Performed by: FAMILY MEDICINE

## 2022-02-11 NOTE — PROGRESS NOTES
22     Glorious Conchis    : 1965 Sex: female   Age: 61 y.o. Chief Complaint   Patient presents with   Lenice Freeze     on ice last week, hit head and has had some dizziness and headache and feels unsteady    Headache    Other     placed on keppra and wondering if needs to be qd or bid       Prior to Admission medications    Medication Sig Start Date End Date Taking? Authorizing Provider   levETIRAcetam (KEPPRA) 500 MG tablet Take 1 tablet by mouth 2 times daily for 11 doses 22 Yes Kwame Barroso MD   oxyCODONE (ROXICODONE) 5 MG immediate release tablet Take 1 tablet by mouth every 6 hours as needed for Pain for up to 3 days. 22 Yes Kwame Barroso MD   pantoprazole (PROTONIX) 40 MG tablet Take 1 tablet by mouth daily 22  Yes Maxwell Oates DO   rosuvastatin (CRESTOR) 5 MG tablet Take 1 tablet by mouth daily 22  Yes Gabino Salazar DO   amitriptyline (ELAVIL) 25 MG tablet Take 1 tablet by mouth nightly 22  Yes Maxwell Oates DO   busPIRone (BUSPAR) 5 MG tablet Take 1 tablet by mouth 2 times daily 22  Yes Maxwell Oates DO   metFORMIN (GLUCOPHAGE-XR) 500 MG extended release tablet 2 morning  2 dinner 22  Yes Maxwell Oates DO   desvenlafaxine succinate (PRISTIQ) 100 MG TB24 extended release tablet TAKE 1 TABLET DAILY 21  Yes Gabino Salazar DO          HPI: Patient evaluated today subarachnoid hemorrhage hypertension diabetes hyperlipidemia subdural hematoma. Onset of injury was . Evaluated through the emergency room at that time. CT scan completed on  revealed subarachnoid hemorrhage controlled. Patient was discharged home in stable condition and to be followed up outpatient. Injury occurred at home accidentally slipping on ice. Struck the back of her head. Today she remains lethargic but oriented to person place and time. Appropriate with conversation and comprehension.   Neurologically she is grossly intact. Has adequate family assistance at home. Discussed Keppra and recommended on a twice a day basis. Review of Systems   Constitutional: Positive for fatigue. HENT: Negative. Eyes: Negative. Respiratory: Negative. Gastrointestinal: Negative. Endocrine: Negative. Genitourinary: Negative. Musculoskeletal: Negative. Skin: Negative. Allergic/Immunologic: Negative. Neurological: Positive for weakness. Hematological: Negative. Psychiatric/Behavioral: Negative. Current Outpatient Medications:     levETIRAcetam (KEPPRA) 500 MG tablet, Take 1 tablet by mouth 2 times daily for 11 doses, Disp: 11 tablet, Rfl: 0    oxyCODONE (ROXICODONE) 5 MG immediate release tablet, Take 1 tablet by mouth every 6 hours as needed for Pain for up to 3 days. , Disp: 12 tablet, Rfl: 0    pantoprazole (PROTONIX) 40 MG tablet, Take 1 tablet by mouth daily, Disp: 90 tablet, Rfl: 3    rosuvastatin (CRESTOR) 5 MG tablet, Take 1 tablet by mouth daily, Disp: 90 tablet, Rfl: 3    amitriptyline (ELAVIL) 25 MG tablet, Take 1 tablet by mouth nightly, Disp: 90 tablet, Rfl: 3    busPIRone (BUSPAR) 5 MG tablet, Take 1 tablet by mouth 2 times daily, Disp: 180 tablet, Rfl: 1    metFORMIN (GLUCOPHAGE-XR) 500 MG extended release tablet, 2 morning  2 dinner, Disp: 360 tablet, Rfl: 3    desvenlafaxine succinate (PRISTIQ) 100 MG TB24 extended release tablet, TAKE 1 TABLET DAILY, Disp: 90 tablet, Rfl: 3    Allergies   Allergen Reactions    Iodine        Social History     Tobacco Use    Smoking status: Never Smoker    Smokeless tobacco: Never Used   Substance Use Topics    Alcohol use: Not on file    Drug use: Not on file      Past Surgical History:   Procedure Laterality Date    TONSILLECTOMY      at age 9     Family History   Problem Relation Age of Onset    Diabetes Father     Heart Disease Father      Past Medical History:   Diagnosis Date    Depression     [facility] mellitus [facility] [address]     Gastritis     Hyperlipidemia        Vitals:    02/11/22 1030   BP: 120/70   Pulse: 73   Temp: 98.9 °F (37.2 °C)   SpO2: 98%     BP Readings from Last 3 Encounters:   02/11/22 120/70   02/08/22 130/79   01/13/22 120/74        Physical Exam  Vitals and nursing note reviewed. Constitutional:       Appearance: She is well-developed. HENT:      Head: Normocephalic. Right Ear: External ear normal.      Left Ear: External ear normal.      Nose: Nose normal.   Eyes:      Conjunctiva/sclera: Conjunctivae normal.      Pupils: Pupils are equal, round, and reactive to light. Cardiovascular:      Rate and Rhythm: Normal rate. Pulmonary:      Breath sounds: Normal breath sounds. Abdominal:      General: Bowel sounds are normal.      Palpations: Abdomen is soft. Musculoskeletal:         General: Normal range of motion. Cervical back: Normal range of motion and neck supple. Skin:     General: Skin is warm and dry. Neurological:      Mental Status: She is alert and oriented to person, place, and time. Psychiatric:         Behavior: Behavior normal.     Present vitals physical examination stable. Neurologically intact as noted. Heart was regular lungs are clear. I am going to sit tight with current medications care. Follow-up visit with me on the 01NL and certainly sooner if necessary. Reviewed reports from emergency room and appropriate care. Reviewed discharge summary and patient was admitted for observation on 6 February. Discharged home stable condition on seventh. Plan Per Assessment:  Angela Arias was seen today for fall, headache and other.     Diagnoses and all orders for this visit:    SAH (subarachnoid hemorrhage) ([facility]    Essential hypertension    Type 2 diabetes mellitus without complication, without long-term current use of insulin (HCC)    Hyperlipidemia, unspecified hyperlipidemia type    SDH (subdural hematoma) ([facility])            Return in about 1 week (around 2/18/2022). Kaleb Araiza DO    Note was generated with the assistance of voice recognition software. Document was reviewed however may contain grammatical errors.

## 2022-02-21 ENCOUNTER — OFFICE VISIT (OUTPATIENT)
Dept: PRIMARY CARE CLINIC | Age: 57
End: 2022-02-21
Payer: COMMERCIAL

## 2022-02-21 VITALS
BODY MASS INDEX: 39.86 KG/M2 | OXYGEN SATURATION: 96 % | SYSTOLIC BLOOD PRESSURE: 126 MMHG | DIASTOLIC BLOOD PRESSURE: 82 MMHG | TEMPERATURE: 97.4 F | HEART RATE: 75 BPM | HEIGHT: 63 IN

## 2022-02-21 DIAGNOSIS — R53.83 FATIGUE, UNSPECIFIED TYPE: ICD-10-CM

## 2022-02-21 DIAGNOSIS — I10 ESSENTIAL HYPERTENSION: ICD-10-CM

## 2022-02-21 DIAGNOSIS — S06.5XAA SDH (SUBDURAL HEMATOMA): ICD-10-CM

## 2022-02-21 DIAGNOSIS — I60.9 SUBARACHNOID HEMORRHAGE (HCC): Primary | ICD-10-CM

## 2022-02-21 DIAGNOSIS — E11.9 TYPE 2 DIABETES MELLITUS WITHOUT COMPLICATION, WITHOUT LONG-TERM CURRENT USE OF INSULIN (HCC): ICD-10-CM

## 2022-02-21 DIAGNOSIS — R11.0 NAUSEA: ICD-10-CM

## 2022-02-21 PROCEDURE — 99214 OFFICE O/P EST MOD 30 MIN: CPT | Performed by: FAMILY MEDICINE

## 2022-02-21 PROCEDURE — 3051F HG A1C>EQUAL 7.0%<8.0%: CPT | Performed by: FAMILY MEDICINE

## 2022-02-21 NOTE — PROGRESS NOTES
22     Donald Callahan    : 1965 Sex: female   Age: 61 y.o. Chief Complaint   Patient presents with    Fall     1 week f/u from fall       Prior to Admission medications    Medication Sig Start Date End Date Taking? Authorizing Provider   pantoprazole (PROTONIX) 40 MG tablet Take 1 tablet by mouth daily 22  Yes Todd Oates, DO   rosuvastatin (CRESTOR) 5 MG tablet Take 1 tablet by mouth daily 22  Yes Marisela Zhong, DO   amitriptyline (ELAVIL) 25 MG tablet Take 1 tablet by mouth nightly 22  Yes Todd Oates, DO   busPIRone (BUSPAR) 5 MG tablet Take 1 tablet by mouth 2 times daily 22  Yes Todd Oates, DO   metFORMIN (GLUCOPHAGE-XR) 500 MG extended release tablet 2 morning  2 dinner 22  Yes Todd Oates, DO   desvenlafaxine succinate (PRISTIQ) 100 MG TB24 extended release tablet TAKE 1 TABLET DAILY 21  Yes Todd Oates, DO   levETIRAcetam (KEPPRA) 500 MG tablet Take 1 tablet by mouth 2 times daily for 11 doses 22  Ingrid Mcneil MD          HPI: Evaluated today subarachnoid hemorrhage subdural hematoma follow-up. Overall neurologically she has remained stable. Still complaining some intermittent headaches and some mild visual change right eye. Follows up with trauma care tomorrow and  early March and will have a repeat CT study at that time. Medications have been reviewed maintain as prescribed. No seizure activity. Review of Systems   Constitutional: Negative. HENT: Negative. Eyes: Negative. Respiratory: Negative. Gastrointestinal: Negative. Endocrine: Negative. Genitourinary: Negative. Musculoskeletal: Negative. Skin: Negative. Allergic/Immunologic: Negative. Neurological: Negative. Hematological: Negative. Psychiatric/Behavioral: Negative. Today systems review is overall stable neurologically intact.         Current Outpatient Medications:     pantoprazole (PROTONIX) 40 MG tablet, Take 1 tablet by mouth daily, Disp: 90 tablet, Rfl: 3    rosuvastatin (CRESTOR) 5 MG tablet, Take 1 tablet by mouth daily, Disp: 90 tablet, Rfl: 3    amitriptyline (ELAVIL) 25 MG tablet, Take 1 tablet by mouth nightly, Disp: 90 tablet, Rfl: 3    busPIRone (BUSPAR) 5 MG tablet, Take 1 tablet by mouth 2 times daily, Disp: 180 tablet, Rfl: 1    metFORMIN (GLUCOPHAGE-XR) 500 MG extended release tablet, 2 morning  2 dinner, Disp: 360 tablet, Rfl: 3    desvenlafaxine succinate (PRISTIQ) 100 MG TB24 extended release tablet, TAKE 1 TABLET DAILY, Disp: 90 tablet, Rfl: 3    levETIRAcetam (KEPPRA) 500 MG tablet, Take 1 tablet by mouth 2 times daily for 11 doses, Disp: 11 tablet, Rfl: 0    Allergies   Allergen Reactions    Iodine        Social History     Tobacco Use    Smoking status: Never Smoker    Smokeless tobacco: Never Used   Substance Use Topics    Alcohol use: Not on file    Drug use: Not on file      Past Surgical History:   Procedure Laterality Date    TONSILLECTOMY      at age 9     Family History   Problem Relation Age of Onset    Diabetes Father     Heart Disease Father      Past Medical History:   Diagnosis Date    Depression     Diabetes mellitus ([facility] [address]     Gastritis     Hyperlipidemia        Vitals:    02/21/22 1402   BP: 126/82   Pulse: 75   Temp: 97.4 °F (36.3 °C)   SpO2: 96%   Height: 5' 3\" (1.6 m)     BP Readings from Last 3 Encounters:   02/21/22 126/82   02/11/22 120/70   02/08/22 130/79        Physical Exam  Vitals and nursing note reviewed. Constitutional:       Appearance: She is well-developed. HENT:      Head: Normocephalic. Right Ear: External ear normal.      Left Ear: External ear normal.      Nose: Nose normal.   Eyes:      Conjunctiva/sclera: Conjunctivae normal.      Pupils: Pupils are equal, round, and reactive to light. Cardiovascular:      Rate and Rhythm: Normal rate. Pulmonary:      Breath sounds: Normal breath sounds.    Abdominal:      General: Bowel sounds are normal.      Palpations: Abdomen is soft. Musculoskeletal:         General: Normal range of motion. Cervical back: Normal range of motion and neck supple. Skin:     General: Skin is warm and dry. Neurological:      Mental Status: She is alert and oriented to person, place, and time. Psychiatric:         Behavior: Behavior normal.     Today's vitals physical examination stable. I will maintain present meds and care. Reassessment 2 to 3 weeks and sooner if problems. Plan Per Assessment:  Collin Mckinney was seen today for fall. Diagnoses and all orders for this visit:    Subarachnoid hemorrhage (HCC)    SDH (subdural hematoma) ([facility] [address]    Type 2 diabetes mellitus without complication, without long-term current use of insulin (HCC)    Essential hypertension    Fatigue, unspecified type    Nausea            Return in about 17 days (around 3/10/2022). Zackary Jordan,     Note was generated with the assistance of voice recognition software. Document was reviewed however may contain grammatical errors.

## 2022-02-22 ENCOUNTER — OFFICE VISIT (OUTPATIENT)
Dept: SURGERY | Age: 57
End: 2022-02-22
Payer: COMMERCIAL

## 2022-02-22 VITALS
HEART RATE: 86 BPM | TEMPERATURE: 97.7 F | SYSTOLIC BLOOD PRESSURE: 133 MMHG | DIASTOLIC BLOOD PRESSURE: 82 MMHG | BODY MASS INDEX: 39.87 KG/M2 | HEIGHT: 63 IN | RESPIRATION RATE: 16 BRPM | OXYGEN SATURATION: 96 % | WEIGHT: 225 LBS

## 2022-02-22 DIAGNOSIS — S06.5XAA SDH (SUBDURAL HEMATOMA): ICD-10-CM
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
## 2022-02-22 DIAGNOSIS — I60.9 SAH (SUBARACHNOID HEMORRHAGE) (HCC): Primary | ICD-10-CM

## 2022-02-22 PROCEDURE — 99213 OFFICE O/P EST LOW 20 MIN: CPT | Performed by: NURSE PRACTITIONER

## 2022-02-22 PROCEDURE — 99212 OFFICE O/P EST SF 10 MIN: CPT | Performed by: NURSE PRACTITIONER

## 2022-02-22 RX ORDER — ACETAMINOPHEN 325 MG/1
650 TABLET ORAL EVERY 6 HOURS PRN
COMMUNITY

## 2022-02-22 RX ORDER — PHENOL 1.4 %
AEROSOL, SPRAY (ML) MUCOUS MEMBRANE
COMMUNITY

## 2022-02-22 NOTE — PROGRESS NOTES
Trauma Clinic Progress Note   2/22/2022     Date of injury:  2/6/22    MYLES/Injuries:   Patient Active Problem List   Diagnosis Code    Chest pain R07.9    Gastroesophageal reflux disease with esophagitis K21.00    Positive depression screening Z13.31    Essential hypertension I10    Hyperlipidemia E78.5    Depression F32. A    Anxiety F41.9    Impaired fasting glucose R73.01    Primary osteoarthritis of right knee M17.11    Type 2 diabetes mellitus without complication, without long-term current use of insulin (HCC) E11.9    Trauma T14.90XA    SDH [facility]subdural hematoma) ([facility]) S06.5X9A    SAH (subarachnoid hemorrhage) ([facility]) I60.9    Subarachnoid hemorrhage ([facility]) I60.9    Fatigue R53.83    Nausea R11.0       Surgeries:   Past Surgical History:   Procedure Laterality Date    TONSILLECTOMY      at age 9       Vital signs:    /82   Pulse 86   Temp 97.7 °F (36.5 °C) (Infrared)   Resp 16   Ht 5' 3\" (1.6 m)   Wt 225 lb (102.1 kg)   SpO2 96%   BMI 39.86 kg/m²     Medications:    Prior to Admission medications    Medication Sig Start Date End Date Taking?  Authorizing Provider   acetaminophen (TYLENOL) 325 MG tablet Take 650 mg by mouth every 6 hours as needed for Pain   Yes Historical Provider, MD   Melatonin 10 MG TABS Take by mouth   Yes Historical Provider, MD   pantoprazole (PROTONIX) 40 MG tablet Take 1 tablet by mouth daily 1/13/22  Yes Don Oates DO   rosuvastatin (CRESTOR) 5 MG tablet Take 1 tablet by mouth daily 1/13/22  Yes Don Oates DO   amitriptyline (ELAVIL) 25 MG tablet Take 1 tablet by mouth nightly 1/13/22  Yes Don Oates DO   busPIRone (BUSPAR) 5 MG tablet Take 1 tablet by mouth 2 times daily 1/13/22  Yes Don Oates DO   metFORMIN (GLUCOPHAGE-XR) 500 MG extended release tablet 2 morning  2 dinner 1/13/22  Yes Don Oates DO   desvenlafaxine succinate (PRISTIQ) 100 MG TB24 extended release tablet TAKE 1 TABLET DAILY 8/2/21  Yes Don Rahman DO Иван          CC:  Trauma follow up    Carlos Alberto Martinez presents to trauma clinic for follow up of injury sustained from a mechanical fall on black ice. She incurred a right-sided parafalcine subarachnoid hemorrhage and a subdural hemorrhage. She states that it took her a week before she could get up and move around after she got out of the hospital.  She has a headache most days of the week. She describes the headaches as mild for 2 days and then on the third day it is very severe. She states that she is using Tylenol on the days where her headaches are mild. And then on the days where her headaches are not mild she will use oxycodone. She states her appetite is poor and she is not sleeping. She is concerned about work and has brought family papers with her. She is scheduled for a CT scan Head and to follow-up with Dr. Ary Simpson on 3/9. Denies any associated symptoms with her headaches. Mrs. Sesay asked about returning to work and I defer that decision to Dr. Ary Simpson. Physical Exam  Physical Exam  Vitals and nursing note reviewed. Constitutional:       Appearance: She is ill-appearing. HENT:      Head: Normocephalic. Cardiovascular:      Rate and Rhythm: Normal rate and regular rhythm. Pulmonary:      Effort: Pulmonary effort is normal.      Breath sounds: Normal breath sounds. Musculoskeletal:         General: Normal range of motion. Skin:     General: Skin is warm and dry. Neurological:      General: No focal deficit present. Mental Status: She is alert and oriented to person, place, and time. Mental status is at baseline. Psychiatric:         Mood and Affect: Mood normal.         Behavior: Behavior normal.         Thought Content: Thought content normal.         Judgment: Judgment normal.           Controlled substances monitoring: possible medication side effects, risk of tolerance and/or dependence, and alternative treatments discussed and not applicable.     Education  Instructed on mulitmodal analgesia. Encouraged decreasing use of opioid medications with use of NSAIDS and Acetaminophen. Reinforced follow up with Dr. Emily Mc. Assessment  Patient Active Problem List   Diagnosis Code    Chest pain R07.9    Gastroesophageal reflux disease with esophagitis K21.00    Positive depression screening Z13.31    Essential hypertension I10    Hyperlipidemia E78.5    Depression F32. A    Anxiety F41.9    Impaired fasting glucose R73.01    Primary osteoarthritis of right knee M17.11    Type 2 diabetes mellitus without complication, without long-term current use of insulin (HCC) E11.9    Trauma T14.90XA    SDH (subdural hematoma) (HCC) S06.5X9A    SAH (subarachnoid hemorrhage) (HCC) I60.9    Subarachnoid hemorrhage (HCC) I60.9    Fatigue R53.83    Nausea R11.0       Plan  Return to clinic as needed      Frankie Haro DNP, APRN- CNP  2/22/2022  10:17 AM

## 2022-02-23 RX ORDER — DESVENLAFAXINE 100 MG/1
TABLET, EXTENDED RELEASE ORAL
Qty: 14 TABLET | Refills: 0 | Status: SHIPPED
Start: 2022-02-23 | End: 2022-08-03

## 2022-03-09 ENCOUNTER — OFFICE VISIT (OUTPATIENT)
Dept: NEUROSURGERY | Age: 57
End: 2022-03-09
Payer: COMMERCIAL

## 2022-03-09 ENCOUNTER — HOSPITAL ENCOUNTER (OUTPATIENT)
Dept: CT IMAGING | Age: 57
Discharge: HOME | End: 2022-03-11
Payer: COMMERCIAL

## 2022-03-09 VITALS
DIASTOLIC BLOOD PRESSURE: 84 MMHG | OXYGEN SATURATION: 94 % | BODY MASS INDEX: 39.87 KG/M2 | HEIGHT: 63 IN | RESPIRATION RATE: 20 BRPM | WEIGHT: 225 LBS | TEMPERATURE: 98.2 F | HEART RATE: 76 BPM | SYSTOLIC BLOOD PRESSURE: 146 MMHG

## 2022-03-09 DIAGNOSIS — S06.5XAA SDH (SUBDURAL HEMATOMA): Primary | ICD-10-CM

## 2022-03-09 DIAGNOSIS — S06.5XAA SDH (SUBDURAL HEMATOMA): ICD-10-CM

## 2022-03-09 PROCEDURE — 70450 CT HEAD/BRAIN W/O DYE: CPT

## 2022-03-09 PROCEDURE — 99213 OFFICE O/P EST LOW 20 MIN: CPT | Performed by: PHYSICIAN ASSISTANT

## 2022-03-09 NOTE — PROGRESS NOTES
Hospital Follow-up     Subjective: Chi Gresham is a 61 y.o.  female who initially presented to the hospital 2/7/22 after falling on ice. She was found to have suffered a small parafalcine and tentorial subdural hematoma. Pt presents to the office today for a 1 month follow up. Pt states she has been doing better. Headaches have decreased to every other day. She does still have some blurry vision in her right eye, ringing in her ears, and motion sickness that has progressively improved. Head CT scan reviewed. Physical Exam:              WDWN, no apparent distress              Non-labored breathing               Vitals Stable              Alert and oriented x3              CN 3-12 intact              PERRL              EOMI              ALVARADO well              Motor strength symmetric              Sensation to LT intact bilaterally                  Imaging: 3/9/22 head CT scan: previous hemorrhage resolved. No acute abnormalities noted. Final report pending. Assessment: This is a 61 y.o.  female presenting for a 1 month follow-up for SDH. Plan:  -Hemorrhage resolved. Pt continues to have symptoms of a concussion. Discussed expectations. Symptoms may continue but will continue to progressively improve.   -Okay for AC/AP or NSAIDs if needed  -Recommend progressively going back to work  -OARRS report reviewed   -Follow-up in neurosurgery clinic PRN  -Call or return to neurosurgery office sooner if symptoms worsen or if new issues arise in the interim.     Electronically signed by Jordan Ny PA-C on 3/9/2022 at 10:05 AM

## 2022-03-11 ENCOUNTER — OFFICE VISIT (OUTPATIENT)
Dept: SURGERY | Age: 57
End: 2022-03-11
Payer: COMMERCIAL

## 2022-03-11 VITALS
HEART RATE: 81 BPM | TEMPERATURE: 97.9 F | OXYGEN SATURATION: 99 % | SYSTOLIC BLOOD PRESSURE: 146 MMHG | RESPIRATION RATE: 16 BRPM | DIASTOLIC BLOOD PRESSURE: 82 MMHG | HEIGHT: 63 IN | WEIGHT: 222 LBS | BODY MASS INDEX: 39.34 KG/M2

## 2022-03-11 DIAGNOSIS — S06.5XAA SDH (SUBDURAL HEMATOMA): Primary | ICD-10-CM

## 2022-03-11 PROCEDURE — 99213 OFFICE O/P EST LOW 20 MIN: CPT | Performed by: CLINICAL NURSE SPECIALIST

## 2022-03-11 PROCEDURE — 99212 OFFICE O/P EST SF 10 MIN: CPT

## 2022-03-11 NOTE — PROGRESS NOTES
Trauma Clinic Progress Note   3/11/2022     Date of injury:  2/6        MYLES/Injuries:   Patient Active Problem List   Diagnosis Code    Chest pain R07.9    Gastroesophageal reflux disease with esophagitis K21.00    Positive depression screening Z13.31    Essential hypertension I10    Hyperlipidemia E78.5    Depression F32. A    Anxiety F41.9    Impaired fasting glucose R73.01    Primary osteoarthritis of right knee M17.11    Type 2 diabetes mellitus without complication, without long-term current use of insulin (HCC) E11.9    Trauma T14.90XA    SDH (subdural hematoma) ([facility]) S06.5X9A    SAH (subarachnoid hemorrhage) ([facility]) I60.9    Subarachnoid hemorrhage (HCC) I60.9    Fatigue R53.83    Nausea R11.0       Surgeries:   Past Surgical History:   Procedure Laterality Date    TONSILLECTOMY      at age 9       Vital signs:    BP (!) 146/82 (Site: Left Upper Arm, Position: Sitting, Cuff Size: Small Adult)   Pulse 81   Temp 97.9 °F (36.6 °C)   Resp 16   Ht 5' 3\" (1.6 m)   Wt 222 lb (100.7 kg)   SpO2 99%   BMI 39.33 kg/m²     Medications:    Prior to Admission medications    Medication Sig Start Date End Date Taking?  Authorizing Provider   desvenlafaxine succinate (PRISTIQ) 100 MG TB24 extended release tablet TAKE 1 TABLET DAILY 2/23/22  Yes Kecia Oates DO   acetaminophen (TYLENOL) 325 MG tablet Take 650 mg by mouth every 6 hours as needed for Pain   Yes Historical Provider, MD   Melatonin 10 MG TABS Take by mouth   Yes Historical Provider, MD   pantoprazole (PROTONIX) 40 MG tablet Take 1 tablet by mouth daily 1/13/22  Yes Kecia Oates DO   rosuvastatin (CRESTOR) 5 MG tablet Take 1 tablet by mouth daily 1/13/22  Yes Kecia Oates DO   amitriptyline (ELAVIL) 25 MG tablet Take 1 tablet by mouth nightly 1/13/22  Yes Kecia Oates DO   busPIRone (BUSPAR) 5 MG tablet Take 1 tablet by mouth 2 times daily 1/13/22  Yes Kecia Oates DO   metFORMIN (GLUCOPHAGE-XR) 500 MG extended release tablet 2 morning  2 dinner 1/13/22  Yes Radha Ann DO          CC:  Trauma follow up    Patient presents to the clinic today for follow up of injury sustained from a mechanical fall on black ice. She incurred a right-sided parafalcine subarachnoid hemorrhage and a subdural hemorrhage. She states she is still having headaches but can tell difference between headache from sinuses and bleed. She states she is still having nausea but is not enough to need medication. She is still having difficulty sleeping at night, finding herself with many nights of insomnia. She is taking melatonin and amitriptyline. She has seen [facility], has been released by them and is asking to go back to work part-time for 4 hours a day. She works in an office setting. She has rides to and from works set up. She states she is still having dizziness with head movements, vision changes which seems worse as she gets more tired and tinnitus that has good and bad days. Patient has seen Augusto in follow up. All progress notes have been reviewed. Physical Exam   Physical Exam  Constitutional:       Appearance: Normal appearance. HENT:      Head: Normocephalic. Mouth/Throat:      Mouth: Mucous membranes are moist.   Eyes:      Pupils: Pupils are equal, round, and reactive to light. Cardiovascular:      Rate and Rhythm: Normal rate. Pulmonary:      Effort: Pulmonary effort is normal.   Abdominal:      Palpations: Abdomen is soft. Musculoskeletal:         General: Normal range of motion. Cervical back: Normal range of motion. Skin:     General: Skin is warm and dry. Capillary Refill: Capillary refill takes less than 2 seconds. Neurological:      General: No focal deficit present. Mental Status: She is alert and oriented to person, place, and time. Psychiatric:         Mood and Affect: Mood normal.         Behavior: Behavior normal.         Thought Content:  Thought content normal. Judgment: Judgment normal.             Controlled substances monitoring: possible medication side effects, risk of tolerance and/or dependence, and alternative treatments discussed and OARRS report reviewed today- activity consistent with treatment plan. Education   Instructed to increase activity. Instructed on concussion:  causes, definition, s&s, treatment, course of concussion. Assessment  Patient Active Problem List   Diagnosis Code    Chest pain R07.9    Gastroesophageal reflux disease with esophagitis K21.00    Positive depression screening Z13.31    Essential hypertension I10    Hyperlipidemia E78.5    Depression F32. A    Anxiety F41.9    Impaired fasting glucose R73.01    Primary osteoarthritis of right knee M17.11    Type 2 diabetes mellitus without complication, without long-term current use of insulin (HCC) E11.9    Trauma T14.90XA    SD[facility]oma) [facility]) S06.5X9A    SAH (subarachnoid hemorrhage) ([facility]) I60.9    Subarachnoid hemorrhage (HCC) I60.9    Fatigue R53.83    Nausea R11.0       Plan  RTC 2 weeks  Medications as ordered: ibuprofen, tylenol, melatonin    Total time spent face-to-face with the patient, reviewing records and documentation was 20 minutes.      KYLE Ann - NP

## 2022-03-25 ENCOUNTER — OFFICE VISIT (OUTPATIENT)
Dept: SURGERY | Age: 57
End: 2022-03-25
Payer: COMMERCIAL

## 2022-03-25 VITALS
HEART RATE: 74 BPM | HEIGHT: 63 IN | DIASTOLIC BLOOD PRESSURE: 82 MMHG | BODY MASS INDEX: 39.34 KG/M2 | OXYGEN SATURATION: 96 % | SYSTOLIC BLOOD PRESSURE: 147 MMHG | WEIGHT: 222 LBS | RESPIRATION RATE: 16 BRPM

## 2022-03-25 DIAGNOSIS — I60.9 SAH (SUBARACHNOID HEMORRHAGE) (HCC): ICD-10-CM

## 2022-03-25 PROCEDURE — 99212 OFFICE O/P EST SF 10 MIN: CPT | Performed by: CLINICAL NURSE SPECIALIST

## 2022-03-25 NOTE — PROGRESS NOTES
Trauma Clinic Progress Note   3/25/2022     Date of injury: 2/6         MYLES/Injuries:   Patient Active Problem List   Diagnosis Code    Chest pain R07.9    Gastroesophageal reflux disease with esophagitis K21.00    Positive depression screening Z13.31    Essential hypertension I10    Hyperlipidemia E78.5    Depression F32. A    Anxiety F41.9    Impaired fasting glucose R73.01    Primary osteoarthritis of right knee M17.11    Type 2 diabetes mellitus without complication, without long-term current use of insulin ([facility]) E11.9    Trauma T14.90XA    SDH (subdural hematoma) ([facility]) S06.5X9A    SAH (subarachnoid hemorrhage) ([facility]) I60.9    Subarachnoid hemorrhage ([facility]) I60.9    Fatigue R53.83    Nausea R11.0       Surgeries:   Past Surgical History:   Procedure Laterality Date    TONSILLECTOMY      at age 9       Vital signs:    BP (!) 147/82 (Site: Left Upper Arm, Position: Sitting, Cuff Size: Small Adult)   Pulse 74   Resp 16   Ht 5' 3\" (1.6 m)   Wt 222 lb (100.7 kg)   SpO2 96%   BMI 39.33 kg/m²     Medications:    Prior to Admission medications    Medication Sig Start Date End Date Taking?  Authorizing Provider   desvenlafaxine succinate (PRISTIQ) 100 MG TB24 extended release tablet TAKE 1 TABLET DAILY 2/23/22  Yes Verlinda Earing Tayloroff, DO   acetaminophen (TYLENOL) 325 MG tablet Take 650 mg by mouth every 6 hours as needed for Pain   Yes Historical Provider, MD   Melatonin 10 MG TABS Take by mouth   Yes Historical Provider, MD   pantoprazole (PROTONIX) 40 MG tablet Take 1 tablet by mouth daily 1/13/22  Yes Verlinda Earing Traikoff, DO   rosuvastatin (CRESTOR) 5 MG tablet Take 1 tablet by mouth daily 1/13/22  Yes Verlinda Earing Traikoff, DO   amitriptyline (ELAVIL) 25 MG tablet Take 1 tablet by mouth nightly 1/13/22  Yes Verlinda Earing Traikoff, DO   busPIRone (BUSPAR) 5 MG tablet Take 1 tablet by mouth 2 times daily 1/13/22  Yes Verlinda Earing Traikoff, DO   metFORMIN (GLUCOPHAGE-XR) 500 MG extended release tablet 2 morning  2 dinner 1/13/22  Yes Kyle CrawfordDO          CC:  Trauma follow up    Patient presents to the clinic today for follow up of injury sustained from a mechanical fall on black ice.  She incurred a right-sided parafalcine subarachnoid hemorrhage and a subdural hemorrhage. She states she has been working part time for the past two weeks, has been feeling good and would like to go back full time on Monday. She states she is still getting intermittent headaches, but they have improved; and she is still having vision changes. She does have an appointment for an eye doctor in May. She has no other complaints at the present time. All progress notes have been reviewed. Physical Exam   Physical Exam  Constitutional:       Appearance: Normal appearance. HENT:      Mouth/Throat:      Mouth: Mucous membranes are moist.   Eyes:      Pupils: Pupils are equal, round, and reactive to light. Cardiovascular:      Rate and Rhythm: Normal rate and regular rhythm. Pulses: Normal pulses. Heart sounds: Normal heart sounds. Pulmonary:      Effort: Pulmonary effort is normal.      Breath sounds: Normal breath sounds. Abdominal:      Palpations: Abdomen is soft. Musculoskeletal:         General: Normal range of motion. Cervical back: Normal range of motion. Skin:     General: Skin is warm and dry. Capillary Refill: Capillary refill takes less than 2 seconds. Neurological:      General: No focal deficit present. Mental Status: She is alert and oriented to person, place, and time. Psychiatric:         Mood and Affect: Mood normal.         Behavior: Behavior normal.         Thought Content: Thought content normal.         Judgment: Judgment normal.             Controlled substances monitoring: possible medication side effects, risk of tolerance and/or dependence, and alternative treatments discussed and OARRS report reviewed today- activity consistent with treatment plan.     Education Instructed to increase activity. Instructed on concussion:  causes, definition, s&s, treatment, course of concussion. Assessment  Patient Active Problem List   Diagnosis Code    Chest pain R07.9    Gastroesophageal reflux disease with esophagitis K21.00    Positive depression screening Z13.31    Essential hypertension I10    Hyperlipidemia E78.5    Depression F32. A    Anxiety F41.9    Impaired fasting glucose R73.01    Primary osteoarthritis of right knee M17.11    Type 2 diabetes mellitus without complication, without long-term current use of insulin ([facility]) E11.9    Trauma T14.90XA    SDH (subdural hematoma) ([facility]) S06.5X9A    SAH (subarachnoid hemorrhage) ([facility]) I60.9    Subarachnoid hemorrhage ([facility]) I60.9    Fatigue R53.83    Nausea R11.0       Plan  RTC PRN  Return to work full time    Medications as ordered: buprofen, tylenol,     Total time spent face-to-face with the patient, reviewing records and documentation was 15 minutes.      KYLE Ruiz - NP

## 2022-03-25 NOTE — LETTER
[address]   [address] [address]  Phone: [phone]    Evert Capone, KYLE - NP        March 25, 2022     Patient: Rosalina Reza   YOB: 1965   Date of Visit: 3/25/2022       To Whom It May Concern: It is my medical opinion that Rosalina Reza may return to work 03/28/2022  with no restrictions. If you have any questions or concerns, please don't hesitate to call.     Sincerely,      Bulmaro Barnett, MASON, EMT  On Behalf of;  Evert Capone, KYLE - NP

## 2022-04-12 DIAGNOSIS — E11.9 TYPE 2 DIABETES MELLITUS WITHOUT COMPLICATION, WITHOUT LONG-TERM CURRENT USE OF INSULIN (HCC): ICD-10-CM

## 2022-04-12 DIAGNOSIS — I10 ESSENTIAL HYPERTENSION: ICD-10-CM

## 2022-04-12 DIAGNOSIS — R73.01 IMPAIRED FASTING GLUCOSE: ICD-10-CM

## 2022-04-12 DIAGNOSIS — E78.5 HYPERLIPIDEMIA, UNSPECIFIED HYPERLIPIDEMIA TYPE: ICD-10-CM

## 2022-04-12 LAB
ALBUMIN SERPL-MCNC: 4.6 G/DL (ref 3.5–5.2)
ALP BLD-CCNC: 93 U/L (ref 35–104)
ALT SERPL-CCNC: 40 U/L (ref 0–32)
ANION GAP SERPL CALCULATED.3IONS-SCNC: 12 MMOL/L (ref 7–16)
AST SERPL-CCNC: 24 U/L (ref 0–31)
BILIRUB SERPL-MCNC: 0.6 MG/DL (ref 0–1.2)
BUN BLDV-MCNC: 18 MG/DL (ref 6–20)
CALCIUM SERPL-MCNC: 9.4 MG/DL (ref 8.6–10.2)
CHLORIDE BLD-SCNC: 100 MMOL/L (ref 98–107)
CHOLESTEROL, TOTAL: 158 MG/DL (ref 0–199)
CO2: 26 MMOL/L (ref 22–29)
CREAT SERPL-MCNC: 1 MG/DL (ref 0.5–1)
GFR AFRICAN AMERICAN: >60
GFR NON-AFRICAN AMERICAN: 57 ML/MIN/1.73
GLUCOSE BLD-MCNC: 142 MG/DL (ref 74–99)
HBA1C MFR BLD: 6.3 % (ref 4–5.6)
HDLC SERPL-MCNC: 33 MG/DL
LDL CHOLESTEROL CALCULATED: 90 MG/DL (ref 0–99)
POTASSIUM SERPL-SCNC: 4.6 MMOL/L (ref 3.5–5)
SODIUM BLD-SCNC: 138 MMOL/L (ref 132–146)
TOTAL PROTEIN: 7.4 G/DL (ref 6.4–8.3)
TRIGL SERPL-MCNC: 173 MG/DL (ref 0–149)
VLDLC SERPL CALC-MCNC: 35 MG/DL

## 2022-04-13 ENCOUNTER — OFFICE VISIT (OUTPATIENT)
Dept: PRIMARY CARE CLINIC | Age: 57
End: 2022-04-13
Payer: COMMERCIAL

## 2022-04-13 VITALS
WEIGHT: 224 LBS | SYSTOLIC BLOOD PRESSURE: 122 MMHG | HEIGHT: 63 IN | OXYGEN SATURATION: 97 % | DIASTOLIC BLOOD PRESSURE: 84 MMHG | TEMPERATURE: 97.9 F | HEART RATE: 73 BPM | BODY MASS INDEX: 39.69 KG/M2

## 2022-04-13 DIAGNOSIS — E11.9 TYPE 2 DIABETES MELLITUS WITHOUT COMPLICATION, WITHOUT LONG-TERM CURRENT USE OF INSULIN (HCC): ICD-10-CM

## 2022-04-13 DIAGNOSIS — F41.9 ANXIETY: ICD-10-CM

## 2022-04-13 DIAGNOSIS — I60.9 SUBARACHNOID HEMORRHAGE (HCC): Primary | ICD-10-CM

## 2022-04-13 DIAGNOSIS — E78.5 HYPERLIPIDEMIA, UNSPECIFIED HYPERLIPIDEMIA TYPE: ICD-10-CM

## 2022-04-13 DIAGNOSIS — F32.A DEPRESSION, UNSPECIFIED DEPRESSION TYPE: ICD-10-CM

## 2022-04-13 DIAGNOSIS — K21.00 GASTROESOPHAGEAL REFLUX DISEASE WITH ESOPHAGITIS WITHOUT HEMORRHAGE: ICD-10-CM

## 2022-04-13 DIAGNOSIS — I10 ESSENTIAL HYPERTENSION: ICD-10-CM

## 2022-04-13 DIAGNOSIS — S06.5XAA SDH (SUBDURAL HEMATOMA): ICD-10-CM

## 2022-04-13 PROCEDURE — 3044F HG A1C LEVEL LT 7.0%: CPT | Performed by: FAMILY MEDICINE

## 2022-04-13 PROCEDURE — 99214 OFFICE O/P EST MOD 30 MIN: CPT | Performed by: FAMILY MEDICINE

## 2022-04-13 ASSESSMENT — ENCOUNTER SYMPTOMS
ALLERGIC/IMMUNOLOGIC NEGATIVE: 1
GASTROINTESTINAL NEGATIVE: 1
EYES NEGATIVE: 1
RESPIRATORY NEGATIVE: 1

## 2022-04-13 NOTE — PROGRESS NOTES
22     Adriane Hamilton    : 1965 Sex: female   Age: 61 y.o. Chief Complaint   [address] [facility] with   [address]       Prior to Admission medications    Medication Sig Start Date End Date Taking? Authorizing Provider   desvenlafaxine succinate (PRISTIQ) 100 MG TB24 extended release tablet TAKE 1 TABLET DAILY 22  Yes Alex Oates DO   acetaminophen (TYLENOL) 325 MG tablet Take 650 mg by mouth every 6 hours as needed for Pain   Yes Historical Provider, MD   Melatonin 10 MG TABS Take by mouth   Yes Historical Provider, MD   pantoprazole (PROTONIX) 40 MG tablet Take 1 tablet by mouth daily 22  Yes Alex Oates DO   rosuvastatin (CRESTOR) 5 MG tablet Take 1 tablet by mouth daily 22  Yes Alex Oates DO   amitriptyline (ELAVIL) 25 MG tablet Take 1 tablet by mouth nightly 22  Yes Alex Oates DO   busPIRone (BUSPAR) 5 MG tablet Take 1 tablet by mouth 2 times daily 22  Yes Alex Oates DO   metFORMIN (GLUCOPHAGE-XR) 500 MG extended release tablet 2 morning  2 dinner 22  Yes Ligia Carpenter, DO          HPI: Michealarline Fleischer seen this morning subarachnoid hemorrhage subdural hematoma and continues to recover. She is having trouble with a full-time hours at work. Backing her off to just 4 hours a day which she was tolerating well. We will maintain for the next 2 weeks and then reassess. Slow advancement from there. Neurologically she is stable at this time cautioned her with driving. Diabetes hyperlipidemia reflux disease all stable. Review of Systems   Constitutional: Negative. HENT: Negative. Eyes: Negative. Respiratory: Negative. Gastrointestinal: Negative. Endocrine: Negative. Genitourinary: Negative. Musculoskeletal: Negative. Skin: Negative. Allergic/Immunologic: Negative. Neurological: Negative. Hematological: Negative. Psychiatric/Behavioral: Negative.        Today systems review overall stable medications as prescribed. Current Outpatient Medications:     desvenlafaxine succinate (PRISTIQ) 100 MG TB24 extended release tablet, TAKE 1 TABLET DAILY, Disp: 14 tablet, Rfl: 0    acetaminophen (TYLENOL) 325 MG tablet, Take 650 mg by mouth every 6 hours as needed for Pain, Disp: , Rfl:     Melatonin 10 MG TABS, Take by mouth, Disp: , Rfl:     pantoprazole (PROTONIX) 40 MG tablet, Take 1 tablet by mouth daily, Disp: 90 tablet, Rfl: 3    rosuvastatin (CRESTOR) 5 MG tablet, Take 1 tablet by mouth daily, Disp: 90 tablet, Rfl: 3    amitriptyline (ELAVIL) 25 MG tablet, Take 1 tablet by mouth nightly, Disp: 90 tablet, Rfl: 3    busPIRone (BUSPAR) 5 MG tablet, Take 1 tablet by mouth 2 times daily, Disp: 180 tablet, Rfl: 1    metFORMIN (GLUCOPHAGE-XR) 500 MG extended release tablet, 2 morning  2 dinner, Disp: 360 tablet, Rfl: 3    Allergies   Allergen Reactions    Methylphenidate Derivatives        Social History     Tobacco Use    Smoking status: Never Smoker    Smokeless tobacco: Never Used   Vaping Use    Vaping Use: Never used   Substance Use Topics    Alcohol use: Not Currently    Drug use: Not Currently      Past Surgical History:   Procedure Laterality Date    TONSILLECTOMY      at age 9     Family History   Problem Relation Age of Onset    Diabetes Father     Heart Disease Father      Past Medical History:   Diagnosis Date    Depression     Diabetes mellitus ([facility] [address]     Gastritis     Hyperlipidemia        Vitals:    04/13/22 0814   BP: 122/84   Pulse: 73   Temp: 97.9 °F (36.6 °C)   SpO2: 97%   Weight: 224 lb (101.6 kg)   Height: 5' 3\" (1.6 m)     BP Readings from Last 3 Encounters:   04/13/22 122/84   03/25/22 (!) 147/82   03/11/22 (!) 146/82        Physical Exam  Vitals and nursing note reviewed. Constitutional:       Appearance: She is well-developed. HENT:      Head: Normocephalic.       Right Ear: External ear normal.      Left Ear: External ear normal.      Nose: Nose normal. Eyes:      Conjunctiva/sclera: Conjunctivae normal.      Pupils: Pupils are equal, round, and reactive to light. Cardiovascular:      Rate and Rhythm: Normal rate. Pulmonary:      Breath sounds: Normal breath sounds. Abdominal:      General: Bowel sounds are normal.      Palpations: Abdomen is soft. Musculoskeletal:         General: Normal range of motion. Cervical back: Normal range of motion and neck supple. Skin:     General: Skin is warm and dry. Neurological:      Mental Status: She is alert and oriented to person, place, and time. Psychiatric:         Behavior: Behavior normal.     Current vitals physical examination stable. Neurologically she is intact. Slow progression post cerebral bleed concussion injury. We will allow for more gradual return to work and activity. Reassessment in 2 weeks and then further recommendations. Blood sugars have been doing very well A1c at 6.3 reviewed with her today.   Lab Results   Component Value Date    TSH 1.510 11/30/2021    TSH 1.440 07/19/2021    K0QEVWE 94.2 09/20/2013    Q5TALUY 5.6 11/30/2021    N3ORFOJ 6.7 07/19/2021     Lab Results   Component Value Date    CHOL 158 04/12/2022    CHOL 178 11/30/2021     Lab Results   Component Value Date    TRIG 173 (H) 04/12/2022    TRIG 166 (H) 11/30/2021     Lab Results   Component Value Date    HDL 33 04/12/2022    HDL 35 11/30/2021     No results found for: [facility]  Lab Results   Component Value Date    LABVLDL 35 04/12/2022    LABVLDL 33 11/30/2021     Lab Results   Component Value Date    CHOLHDLRATIO 5 01/22/2021    CHOLHDLRATIO 5 06/08/2020     Lab Results   Component Value Date    WBC 5.3 02/08/2022    HGB 12.0 02/08/2022    HCT 35.7 02/08/2022    MCV 91.5 02/08/2022     02/08/2022    LYMPHOPCT 24.2 02/08/2022    RBC 3.90 02/08/2022    MCH 30.8 02/08/2022    MCHC 33.6 02/08/2022    RDW 13.6 02/08/2022     Lab Results   Component Value Date     04/12/2022    K 4.6 04/12/2022    CL 100 04/12/2022    CO2 26 04/12/2022    BUN 18 04/12/2022    CREATININE 1.0 04/12/2022    GLUCOSE 142 (H) 04/12/2022    CALCIUM 9.4 04/12/2022    PROT 7.4 04/12/2022    LABALBU 4.6 04/12/2022    BILITOT 0.6 04/12/2022    ALKPHOS 93 04/12/2022    AST 24 04/12/2022    ALT 40 (H) 04/12/2022    LABGLOM 57 04/12/2022    GFRAA >60 04/12/2022      No results found for: PSA, PSADIA   Lab Results   Component Value Date    LABA1C 6.3 (H) 04/12/2022    LABA1C 7.9 (H) 01/11/2022    LABA1C 9.5 (H) 11/30/2021     No results found for: EAG           Plan Per Assessment:  Radha Lew was seen today for discuss labs. Diagnoses and all orders for this visit:    Subarachnoid hemorrhage (HCC)    SDH (subdural hematoma) (HCC)    Essential hypertension    Type 2 diabetes mellitus without complication, without long-term current use of insulin (HCC)    Hyperlipidemia, unspecified hyperlipidemia type    Depression, unspecified depression type    Anxiety    Gastroesophageal reflux disease with esophagitis without hemorrhage            Return in about 2 weeks (around 4/27/2022). Gabrielle Mccullough DO    Note was generated with the assistance of voice recognition software. Document was reviewed however may contain grammatical errors.

## 2022-04-14 ENCOUNTER — TELEPHONE (OUTPATIENT)
Dept: SURGERY | Age: 57
End: 2022-04-14

## 2022-04-14 NOTE — TELEPHONE ENCOUNTER
MA received a call from patient who states she returned to work, worked a full week last week. Ptaient states Saturday and Sunday she felt like she hit a wall and experienced debilitating fatigue and pain in the back of her head. Patient states she went to work Monday and then Tuesday she went home early complaining of \"The back of my head feels really heavy, dull aches and headache comes later in the day. It is hard to hold my head up, feels like I am swimming to think, dizziness, off balance, nausea, trouble concentrating\". Patient states she is taking tylenol, using ice and even tried Excedrin for the pain. MA explained I would forward the message to Trauma APN's for further advisement. Patient understood and can be reached at [phone].   Electronically signed by Sandra Edwards MA on 4/14/22 at 3:29 PM EDT

## 2022-04-14 NOTE — TELEPHONE ENCOUNTER
Ma informed patient per Jeannine Agee \"Patient has seen Dr. Anni Ramon and has told her to work only 4 hours a day and re evaluate in 2 weeks. There is not anything for us to do. \"  Patient understood.   Electronically signed by Galilea Manley MA on 4/14/22 at 3:49 PM EDT

## 2022-04-14 NOTE — TELEPHONE ENCOUNTER
Patient has seen Dr. Anila Mcmullen and has told her to work only 4 hours a day and re evaluate in 2 weeks. There is not anything for us to do.

## 2022-04-22 ENCOUNTER — OFFICE VISIT (OUTPATIENT)
Dept: PRIMARY CARE CLINIC | Age: 57
End: 2022-04-22
Payer: COMMERCIAL

## 2022-04-22 VITALS
DIASTOLIC BLOOD PRESSURE: 82 MMHG | HEART RATE: 76 BPM | OXYGEN SATURATION: 98 % | SYSTOLIC BLOOD PRESSURE: 120 MMHG | WEIGHT: 227 LBS | BODY MASS INDEX: 40.21 KG/M2 | TEMPERATURE: 98.3 F

## 2022-04-22 DIAGNOSIS — E11.9 TYPE 2 DIABETES MELLITUS WITHOUT COMPLICATION, WITHOUT LONG-TERM CURRENT USE OF INSULIN (HCC): ICD-10-CM

## 2022-04-22 DIAGNOSIS — E78.5 HYPERLIPIDEMIA, UNSPECIFIED HYPERLIPIDEMIA TYPE: ICD-10-CM

## 2022-04-22 DIAGNOSIS — I10 ESSENTIAL HYPERTENSION: Primary | ICD-10-CM

## 2022-04-22 DIAGNOSIS — S06.5XAA SDH (SUBDURAL HEMATOMA): ICD-10-CM

## 2022-04-22 DIAGNOSIS — I60.9 SAH (SUBARACHNOID HEMORRHAGE) (HCC): ICD-10-CM

## 2022-04-22 DIAGNOSIS — I60.9 SUBARACHNOID HEMORRHAGE (HCC): ICD-10-CM

## 2022-04-22 PROCEDURE — 99214 OFFICE O/P EST MOD 30 MIN: CPT | Performed by: FAMILY MEDICINE

## 2022-04-22 PROCEDURE — 3044F HG A1C LEVEL LT 7.0%: CPT | Performed by: FAMILY MEDICINE

## 2022-04-22 ASSESSMENT — ENCOUNTER SYMPTOMS
RESPIRATORY NEGATIVE: 1
GASTROINTESTINAL NEGATIVE: 1
EYES NEGATIVE: 1
ALLERGIC/IMMUNOLOGIC NEGATIVE: 1

## 2022-04-22 NOTE — LETTER
[address] [place]  [place] [employer] [address]  Phone: [phone]  Fax: [address], DO        April 22, 2022     Patient: Saroj Melendrez   YOB: 1965   Date of Visit: 4/22/2022       To Whom It May Concern: It is my medical opinion that Saroj Melendrez beginning April 13 may work 4 hours per day. Starting 4-22 patient may work 6-8 hours per day. If you have any questions or concerns please contact my office.          Bird Brooks,

## 2022-04-22 NOTE — PROGRESS NOTES
physical therapy occupational therapy and if desired we will try to arrange. Her overall progress has been stable and do not feel strong need for therapy at this time. Review of Systems   Constitutional: Negative. HENT: Negative. Eyes: Negative. Respiratory: Negative. Gastrointestinal: Negative. Endocrine: Negative. Genitourinary: Negative. Musculoskeletal: Negative. Skin: Negative. Allergic/Immunologic: Negative. Neurological: Negative. Hematological: Negative. Psychiatric/Behavioral: Negative. Systems review is overall stable. Neurologically improving.         Current Outpatient Medications:     desvenlafaxine succinate (PRISTIQ) 100 MG TB24 extended release tablet, TAKE 1 TABLET DAILY, Disp: 14 tablet, Rfl: 0    acetaminophen (TYLENOL) 325 MG tablet, Take 650 mg by mouth every 6 hours as needed for Pain, Disp: , Rfl:     Melatonin 10 MG TABS, Take by mouth, Disp: , Rfl:     pantoprazole (PROTONIX) 40 MG tablet, Take 1 tablet by mouth daily, Disp: 90 tablet, Rfl: 3    rosuvastatin (CRESTOR) 5 MG tablet, Take 1 tablet by mouth daily, Disp: 90 tablet, Rfl: 3    amitriptyline (ELAVIL) 25 MG tablet, Take 1 tablet by mouth nightly, Disp: 90 tablet, Rfl: 3    busPIRone (BUSPAR) 5 MG tablet, Take 1 tablet by mouth 2 times daily, Disp: 180 tablet, Rfl: 1    metFORMIN (GLUCOPHAGE-XR) 500 MG extended release tablet, 2 morning  2 dinner, Disp: 360 tablet, Rfl: 3    Allergies   Allergen Reactions    Methylphenidate Derivatives        Social History     Tobacco Use    Smoking status: Never Smoker    Smokeless tobacco: Never Used   Vaping Use    Vaping Use: Never used   Substance Use Topics    Alcohol use: Not Currently    Drug use: Not Currently      Past Surgical History:   Procedure Laterality Date    TONSILLECTOMY      at age 9     Family History   Problem Relation Age of Onset    Diabetes Father     Heart Disease Father      Past Medical History: hematoma) (HCC)    Subarachnoid hemorrhage (HCC)    Hyperlipidemia, unspecified hyperlipidemia type    [facility]            Return in about 2 weeks (around 5/6/2022). Luna Washington DO    Note was generated with the assistance of voice recognition software. Document was reviewed however may contain grammatical errors.

## 2022-05-10 ENCOUNTER — OFFICE VISIT (OUTPATIENT)
Dept: PRIMARY CARE CLINIC | Age: 57
End: 2022-05-10
Payer: COMMERCIAL

## 2022-05-10 VITALS
OXYGEN SATURATION: 96 % | HEIGHT: 63 IN | SYSTOLIC BLOOD PRESSURE: 138 MMHG | BODY MASS INDEX: 40.21 KG/M2 | DIASTOLIC BLOOD PRESSURE: 80 MMHG | TEMPERATURE: 98.2 F | HEART RATE: 92 BPM

## 2022-05-10 DIAGNOSIS — F07.81 POST CONCUSSION SYNDROME: ICD-10-CM

## 2022-05-10 DIAGNOSIS — I60.9 SUBARACHNOID HEMORRHAGE (HCC): ICD-10-CM

## 2022-05-10 DIAGNOSIS — S06.5XAA SDH (SUBDURAL HEMATOMA): ICD-10-CM

## 2022-05-10 DIAGNOSIS — E11.9 TYPE 2 DIABETES MELLITUS WITHOUT COMPLICATION, WITHOUT LONG-TERM CURRENT USE OF INSULIN (HCC): ICD-10-CM

## 2022-05-10 DIAGNOSIS — I10 ESSENTIAL HYPERTENSION: Primary | ICD-10-CM

## 2022-05-10 DIAGNOSIS — F41.9 ANXIETY: ICD-10-CM

## 2022-05-10 PROCEDURE — 3044F HG A1C LEVEL LT 7.0%: CPT | Performed by: FAMILY MEDICINE

## 2022-05-10 PROCEDURE — 99214 OFFICE O/P EST MOD 30 MIN: CPT | Performed by: FAMILY MEDICINE

## 2022-05-10 RX ORDER — LANCETS 30 GAUGE
1 EACH MISCELLANEOUS DAILY
Qty: 300 EACH | Refills: 1 | Status: SHIPPED | OUTPATIENT
Start: 2022-05-10

## 2022-05-10 RX ORDER — GLUCOSAMINE HCL/CHONDROITIN SU 500-400 MG
CAPSULE ORAL
Qty: 400 STRIP | Refills: 1 | Status: SHIPPED | OUTPATIENT
Start: 2022-05-10 | End: 2022-05-10 | Stop reason: SDUPTHER

## 2022-05-10 RX ORDER — GLUCOSAMINE HCL/CHONDROITIN SU 500-400 MG
CAPSULE ORAL
Qty: 400 STRIP | Refills: 1 | Status: SHIPPED | OUTPATIENT
Start: 2022-05-10

## 2022-05-10 ASSESSMENT — ENCOUNTER SYMPTOMS
GASTROINTESTINAL NEGATIVE: 1
EYES NEGATIVE: 1
ALLERGIC/IMMUNOLOGIC NEGATIVE: 1
RESPIRATORY NEGATIVE: 1

## 2022-05-10 NOTE — PROGRESS NOTES
5/10/22     Adriane Hamilton    : 1965 Sex: female   Age: 61 y.o. Chief Complaint   Patient presents with    Dizziness     still having dizziness, not feeling better       Prior to Admission medications    Medication Sig Start Date End Date Taking?  Authorizing Provider   NONFORMULARY Neurotrophin PMG   Yes Historical Provider, MD   Omega-3 Fatty Acids (OMEGA-3 FISH OIL PO) Take by mouth   Yes Historical Provider, MD   TURMERIC PO Take by mouth   Yes Historical Provider, MD   NONFORMULARY Cataplex G   Yes Historical Provider, MD   NONFORMULARY Asura Brain   Yes Historical Provider, MD   blood glucose monitor strips Test one times a day  Freestyle Lizemores Lyte 5/10/22  Yes Carry Skeans, DO   Lancets MISC 1 each by Does not apply route daily 5/10/22  Yes Alex Oates,    desvenlafaxine succinate (PRISTIQ) 100 MG TB24 extended release tablet TAKE 1 TABLET DAILY 22  Yes Alex Oates DO   acetaminophen (TYLENOL) 325 MG tablet Take 650 mg by mouth every 6 hours as needed for Pain   Yes Historical Provider, MD   Melatonin 10 MG TABS Take by mouth   Yes Historical Provider, MD   pantoprazole (PROTONIX) 40 MG tablet Take 1 tablet by mouth daily 22  Yes Alex Oates DO   rosuvastatin (CRESTOR) 5 MG tablet Take 1 tablet by mouth daily 22  Yes Alex Oates DO   amitriptyline (ELAVIL) 25 MG tablet Take 1 tablet by mouth nightly 22  Yes Alex Oates DO   busPIRone (BUSPAR) 5 MG tablet Take 1 tablet by mouth 2 times daily 22  Yes Alex Oates DO   metFORMIN (GLUCOPHAGE-XR) 500 MG extended release tablet 2 morning  2 dinner 22  Yes Carry Skeans, DO          HPI:           Review of Systems           Current Outpatient Medications:     NONFORMULARY, Neurotrophin PMG, Disp: , Rfl:     Omega-3 Fatty Acids (OMEGA-3 FISH OIL PO), Take by mouth, Disp: , Rfl:     TURMERIC PO, Take by mouth, Disp: , Rfl:     NONFORMULARY, Cataplex G, Disp: , Rfl:    NONFORMULARY, Asura Brain, Disp: , Rfl:     blood glucose monitor strips, Test one times a day Freestyle Freedom Lyte, Disp: 400 strip, Rfl: 1    Lancets MISC, 1 each by Does not apply route daily, Disp: 300 each, Rfl: 1    desvenlafaxine succinate (PRISTIQ) 100 MG TB24 extended release tablet, TAKE 1 TABLET DAILY, Disp: 14 tablet, Rfl: 0    acetaminophen (TYLENOL) 325 MG tablet, Take 650 mg by mouth every 6 hours as needed for Pain, Disp: , Rfl:     Melatonin 10 MG TABS, Take by mouth, Disp: , Rfl:     pantoprazole (PROTONIX) 40 MG tablet, Take 1 tablet by mouth daily, Disp: 90 tablet, Rfl: 3    rosuvastatin (CRESTOR) 5 MG tablet, Take 1 tablet by mouth daily, Disp: 90 tablet, Rfl: 3    amitriptyline (ELAVIL) 25 MG tablet, Take 1 tablet by mouth nightly, Disp: 90 tablet, Rfl: 3    busPIRone (BUSPAR) 5 MG tablet, Take 1 tablet by mouth 2 times daily, Disp: 180 tablet, Rfl: 1    metFORMIN (GLUCOPHAGE-XR) 500 MG extended release tablet, 2 morning  2 dinner, Disp: 360 tablet, Rfl: 3    Allergies   Allergen Reactions    Methylphenidate Derivatives        Social History     Tobacco Use    Smoking status: Never Smoker    Smokeless tobacco: Never Used   Vaping Use    Vaping Use: Never used   Substance Use Topics    Alcohol use: Not Currently    Drug use: Not Currently      Past Surgical History:   Procedure Laterality Date    TONSILLECTOMY      at age 9     Family History   Problem Relation Age of Onset    Diabetes Father     Heart Disease Father      Past Medical History:   Diagnosis Date    Depression     Diabetes mellitus ([facility] [address]     Gastritis     Hyperlipidemia        Vitals:    05/10/22 1602   BP: 138/80   Pulse: 92   Temp: 98.2 °F (36.8 °C)   SpO2: 96%   Height: 5' 3\" (1.6 m)     BP Readings from Last 3 Encounters:   05/10/22 138/80   04/22/22 120/82   04/13/22 122/84        Physical Exam         Lab Results   Component Value Date    TSH 1.510 11/30/2021    TSH 1.440 07/19/2021    H5VMYAO 94.2 09/20/2013    O7RXFHC 5.6 11/30/2021    S6OUTOO 6.7 07/19/2021     Lab Results   Component Value Date    CHOL 158 04/12/2022    CHOL 178 11/30/2021     Lab Results   Component Value Date    TRIG 173 (H) 04/12/2022    TRIG 166 (H) 11/30/2021     Lab Results   Component Value Date    HDL 33 04/12/2022    HDL 35 11/30/2021     No results found for: Mónica Galloway  Lab Results   Component Value Date    LABVLDL 35 04/12/2022    LABVLDL 33 11/30/2021     Lab Results   Component Value Date    CHOLHDLRATIO 5 01/22/2021    CHOLHDLRATIO 5 06/08/2020     Lab Results   Component Value Date    WBC 5.3 02/08/2022    HGB 12.0 02/08/2022    HCT 35.7 02/08/2022    MCV 91.5 02/08/2022     02/08/2022    LYMPHOPCT 24.2 02/08/2022    RBC 3.90 02/08/2022    MCH 30.8 02/08/2022    MCHC 33.6 02/08/2022    RDW 13.6 02/08/2022     Lab Results   Component Value Date     04/12/2022    K 4.6 04/12/2022     04/12/2022    CO2 26 04/12/2022    BUN 18 04/12/2022    CREATININE 1.0 04/12/2022    GLUCOSE 142 (H) 04/12/2022    CALCIUM 9.4 04/12/2022    PROT 7.4 04/12/2022    LABALBU 4.6 04/12/2022    BILITOT 0.6 04/12/2022    ALKPHOS 93 04/12/2022    AST 24 04/12/2022    ALT 40 (H) 04/12/2022    LABGLOM 57 04/12/2022    GFRAA >60 04/12/2022      No results found for: PSA, PSADIA   Lab Results   Component Value Date    LABA1C 6.3 (H) 04/12/2022    LABA1C 7.9 (H) 01/11/2022    LABA1C 9.5 (H) 11/30/2021     No results found for: EAG     Plan Per Assessment:  There are no diagnoses linked to this encounter. No follow-ups on file. Ashby Severance, DO    Note was generated with the assistance of voice recognition software. Document was reviewed however may contain grammatical errors.

## 2022-05-13 ENCOUNTER — TELEPHONE (OUTPATIENT)
Dept: ADMINISTRATIVE | Age: 57
End: 2022-05-13

## 2022-05-13 NOTE — TELEPHONE ENCOUNTER
A neurology referral to Dr. Hansel Gordon was received on this patient from Dr. Ivy Childress. Pt was referred for Subarachnoid hemorrhage, SDH (subdural hematoma), and Post concussion syndrome. Per Dr. Hansel Gordon, pt should be referred to neurosurgery for these diagnoses. Please refer pt to neurosurgery.

## 2022-06-07 ENCOUNTER — OFFICE VISIT (OUTPATIENT)
Dept: PRIMARY CARE CLINIC | Age: 57
End: 2022-06-07
Payer: COMMERCIAL

## 2022-06-07 VITALS
BODY MASS INDEX: 40.4 KG/M2 | HEART RATE: 75 BPM | WEIGHT: 228 LBS | SYSTOLIC BLOOD PRESSURE: 124 MMHG | OXYGEN SATURATION: 97 % | HEIGHT: 63 IN | DIASTOLIC BLOOD PRESSURE: 80 MMHG | TEMPERATURE: 97.3 F

## 2022-06-07 DIAGNOSIS — I60.9 SUBARACHNOID HEMORRHAGE (HCC): ICD-10-CM

## 2022-06-07 DIAGNOSIS — I10 ESSENTIAL HYPERTENSION: Primary | ICD-10-CM

## 2022-06-07 DIAGNOSIS — F32.A DEPRESSION, UNSPECIFIED DEPRESSION TYPE: ICD-10-CM

## 2022-06-07 DIAGNOSIS — S06.5XAA SDH (SUBDURAL HEMATOMA): ICD-10-CM

## 2022-06-07 DIAGNOSIS — F07.81 POST CONCUSSION SYNDROME: ICD-10-CM

## 2022-06-07 DIAGNOSIS — F41.9 ANXIETY: ICD-10-CM

## 2022-06-07 PROCEDURE — 99214 OFFICE O/P EST MOD 30 MIN: CPT | Performed by: FAMILY MEDICINE

## 2022-06-07 SDOH — ECONOMIC STABILITY: FOOD INSECURITY: WITHIN THE PAST 12 MONTHS, YOU WORRIED THAT YOUR FOOD WOULD RUN OUT BEFORE YOU GOT MONEY TO BUY MORE.: NEVER TRUE

## 2022-06-07 SDOH — ECONOMIC STABILITY: FOOD INSECURITY: WITHIN THE PAST 12 MONTHS, THE FOOD YOU BOUGHT JUST DIDN'T LAST AND YOU DIDN'T HAVE MONEY TO GET MORE.: NEVER TRUE

## 2022-06-07 ASSESSMENT — ENCOUNTER SYMPTOMS
EYES NEGATIVE: 1
RESPIRATORY NEGATIVE: 1
ALLERGIC/IMMUNOLOGIC NEGATIVE: 1
GASTROINTESTINAL NEGATIVE: 1

## 2022-06-07 ASSESSMENT — SOCIAL DETERMINANTS OF HEALTH (SDOH): HOW HARD IS IT FOR YOU TO PAY FOR THE VERY BASICS LIKE FOOD, HOUSING, MEDICAL CARE, AND HEATING?: NOT HARD AT ALL

## 2022-06-07 NOTE — PROGRESS NOTES
22     Neville Cardozo    : 1965 Sex: female   Age: 61 y.o. Chief Complaint   Patient presents with    Hypertension     1 month f/u    Referral - General     needs referral for neurosurgery per Dr Letitia Knight office       Prior to Admission medications    Medication Sig Start Date End Date Taking? Authorizing Provider   NONFORMULARY Neurotrophin PMG   Yes Historical Provider, MD   Omega-3 Fatty Acids (OMEGA-3 FISH OIL PO) Take by mouth   Yes Historical Provider, MD   TURMERIC PO Take by mouth   Yes Historical Provider, MD   NONFORMULARY Cataplex G   Yes Historical Provider, MD   NONFORMULARY Asura Brain   Yes Historical Provider, MD   Lancets MISC 1 each by Does not apply route daily 5/10/22  Yes Laya Oates DO   blood glucose monitor strips Test one times a day Freestyle Bardolph Lyte 5/10/22  Yes Laya Oates DO   desvenlafaxine succinate (PRISTIQ) 100 MG TB24 extended release tablet TAKE 1 TABLET DAILY 22  Yes Laya Oates DO   acetaminophen (TYLENOL) 325 MG tablet Take 650 mg by mouth every 6 hours as needed for Pain   Yes Historical Provider, MD   Melatonin 10 MG TABS Take by mouth   Yes Historical Provider, MD   pantoprazole (PROTONIX) 40 MG tablet Take 1 tablet by mouth daily 22  Yes Laya Oates DO   rosuvastatin (CRESTOR) 5 MG tablet Take 1 tablet by mouth daily 22  Yes Laya Oates DO   amitriptyline (ELAVIL) 25 MG tablet Take 1 tablet by mouth nightly 22  Yes Laya Oates DO   busPIRone (BUSPAR) 5 MG tablet Take 1 tablet by mouth 2 times daily 22  Yes Laya Oates DO   metFORMIN (GLUCOPHAGE-XR) 500 MG extended release tablet 2 morning  2 dinner 22  Yes Ashby Severance, DO          HPI: ref Dr Prachi Abreu development team     TBI  Traumatic brain injury. Courtney Malagon presents today medical follow-up on hypertension anxiety depression postconcussion syndrome related to subarachnoid hemorrhage subdural hematoma. Medically overall she continues to manage fairly well and has returned to some level of work activity. She remains quite fatigued and continues to complain of some visual difficulties. She is seeing ophthalmology and plans are for evaluation with vision developmental team as noted and Dr. Winter Lang as noted. Additional referral made today to Dr. Argenis Mcbride for possible balance and strength training. Review of Systems   Constitutional: Negative. HENT: Negative. Eyes: Negative. Respiratory: Negative. Gastrointestinal: Negative. Endocrine: Negative. Genitourinary: Negative. Musculoskeletal: Negative. Skin: Negative. Allergic/Immunologic: Negative. Neurological: Negative. Hematological: Negative. Psychiatric/Behavioral: Negative. This seems to be stable aside from HPI complaints related to traumatic brain injury.           Current Outpatient Medications:     NONFORMULARY, Neurotrophin PMG, Disp: , Rfl:     Omega-3 Fatty Acids (OMEGA-3 FISH OIL PO), Take by mouth, Disp: , Rfl:     TURMERIC PO, Take by mouth, Disp: , Rfl:     NONFORMULARY, Cataplex G, Disp: , Rfl:     NONFORMULARY, Asura Brain, Disp: , Rfl:     Lancets MISC, 1 each by Does not apply route daily, Disp: 300 each, Rfl: 1    blood glucose monitor strips, Test one times a day Freestyle Freedom Lyte, Disp: 400 strip, Rfl: 1    desvenlafaxine succinate (PRISTIQ) 100 MG TB24 extended release tablet, TAKE 1 TABLET DAILY, Disp: 14 tablet, Rfl: 0    acetaminophen (TYLENOL) 325 MG tablet, Take 650 mg by mouth every 6 hours as needed for Pain, Disp: , Rfl:     Melatonin 10 MG TABS, Take by mouth, Disp: , Rfl:     pantoprazole (PROTONIX) 40 MG tablet, Take 1 tablet by mouth daily, Disp: 90 tablet, Rfl: 3    rosuvastatin (CRESTOR) 5 MG tablet, Take 1 tablet by mouth daily, Disp: 90 tablet, Rfl: 3    amitriptyline (ELAVIL) 25 MG tablet, Take 1 tablet by mouth nightly, Disp: 90 tablet, Rfl: 3   busPIRone (BUSPAR) 5 MG tablet, Take 1 tablet by mouth 2 times daily, Disp: 180 tablet, Rfl: 1    metFORMIN (GLUCOPHAGE-XR) 500 MG extended release tablet, 2 morning  2 dinner, Disp: 360 tablet, Rfl: 3    Allergies   Allergen Reactions    Methylphenidate Derivatives        Social History     Tobacco Use    Smoking status: Never Smoker    Smokeless tobacco: Never Used   Vaping Use    Vaping Use: Never used   Substance Use Topics    Alcohol use: Not Currently    Drug use: Not Currently      Past Surgical History:   Procedure Laterality Date    TONSILLECTOMY      at age 9     Family History   Problem Relation Age of Onset    Diabetes Father     Heart Disease Father      Past Medical History:   Diagnosis Date    Depression     Diabetes mellitus ([facility] [address]     Gastritis     Hyperlipidemia        Vitals:    06/07/22 1614   BP: 124/80   Pulse: 75   Temp: 97.3 °F (36.3 °C)   SpO2: 97%   Weight: 228 lb (103.4 kg)   Height: 5' 3\" (1.6 m)     BP Readings from Last 3 Encounters:   06/07/22 124/80   05/10/22 138/80   04/22/22 120/82        Physical Exam  Vitals and nursing note reviewed. Constitutional:       Appearance: She is well-developed. HENT:      Head: Normocephalic. Right Ear: External ear normal.      Left Ear: External ear normal.      Nose: Nose normal.   Eyes:      Conjunctiva/sclera: Conjunctivae normal.      Pupils: Pupils are equal, round, and reactive to light. Cardiovascular:      Rate and Rhythm: Normal rate. Pulmonary:      Breath sounds: Normal breath sounds. Abdominal:      General: Bowel sounds are normal.      Palpations: Abdomen is soft. Musculoskeletal:         General: Normal range of motion. Cervical back: Normal range of motion and neck supple. Skin:     General: Skin is warm and dry. Neurological:      Mental Status: She is alert and oriented to person, place, and time.    Psychiatric:         Behavior: Behavior normal.     Today's vitals physical examination stable. Pressure is controlled. Heart is controlled lungs are clear. Medications reviewed as prescribed. Reassessment 4 weeks and referrals made today. Plan Per Assessment:  Scott Gonzalez was seen today for hypertension and referral - general.    Diagnoses and all orders for this visit:    Essential hypertension    SDH (subdural hematoma) ([facility] [address]  -     External Referral To Physical Therapy    Post concussion syndrome  -     External Referral To Physical Therapy    Depression, unspecified depression type    Anxiety    Subarachnoid hemorrhage ([facility] [address]            Return in about 1 month (around 7/7/2022). Zeroman Dress, DO    Note was generated with the assistance of voice recognition software. Document was reviewed however may contain grammatical errors.

## 2022-07-28 ENCOUNTER — OFFICE VISIT (OUTPATIENT)
Dept: PRIMARY CARE CLINIC | Age: 57
End: 2022-07-28
Payer: COMMERCIAL

## 2022-07-28 VITALS
WEIGHT: 225 LBS | HEART RATE: 74 BPM | HEIGHT: 63 IN | TEMPERATURE: 97.4 F | BODY MASS INDEX: 39.87 KG/M2 | DIASTOLIC BLOOD PRESSURE: 86 MMHG | SYSTOLIC BLOOD PRESSURE: 128 MMHG | OXYGEN SATURATION: 97 %

## 2022-07-28 DIAGNOSIS — I60.9 SAH (SUBARACHNOID HEMORRHAGE) (HCC): ICD-10-CM

## 2022-07-28 DIAGNOSIS — F41.9 ANXIETY: ICD-10-CM

## 2022-07-28 DIAGNOSIS — I10 ESSENTIAL HYPERTENSION: ICD-10-CM

## 2022-07-28 DIAGNOSIS — E11.9 TYPE 2 DIABETES MELLITUS WITHOUT COMPLICATION, WITHOUT LONG-TERM CURRENT USE OF INSULIN (HCC): Primary | ICD-10-CM

## 2022-07-28 DIAGNOSIS — F32.A DEPRESSION, UNSPECIFIED DEPRESSION TYPE: ICD-10-CM

## 2022-07-28 DIAGNOSIS — S06.5XAA SDH (SUBDURAL HEMATOMA): ICD-10-CM

## 2022-07-28 PROCEDURE — 99214 OFFICE O/P EST MOD 30 MIN: CPT | Performed by: FAMILY MEDICINE

## 2022-07-28 PROCEDURE — 3044F HG A1C LEVEL LT 7.0%: CPT | Performed by: FAMILY MEDICINE

## 2022-07-28 NOTE — PROGRESS NOTES
22     Homa Miller    : 1965 Sex: female   Age: 61 y.o. Chief Complaint   Patient presents with    Hypertension     1 mo f/u         Prior to Admission medications    Medication Sig Start Date End Date Taking? Authorizing Provider   NONFORMULARY Neurotrophin PMG   Yes Historical Provider, MD   Omega-3 Fatty Acids (OMEGA-3 FISH OIL PO) Take by mouth   Yes Historical Provider, MD   TURMERIC PO Take by mouth   Yes Historical Provider, MD   NONFORMULARY Cataplex G   Yes Historical Provider, MD   NONFORMULARY Asura Brain   Yes Historical Provider, MD   Lancets MISC 1 each by Does not apply route daily 5/10/22  Yes Deepali Oates DO   blood glucose monitor strips Test one times a day Freestyle [place] Lyte 5/10/22  Yes Deepali Oates DO   desvenlafaxine succinate (PRISTIQ) 100 MG TB24 extended release tablet TAKE 1 TABLET DAILY 22  Yes Deepali Oates DO   acetaminophen (TYLENOL) 325 MG tablet Take 650 mg by mouth every 6 hours as needed for Pain   Yes Historical Provider, MD   Melatonin 10 MG TABS Take by mouth   Yes Historical Provider, MD   pantoprazole (PROTONIX) 40 MG tablet Take 1 tablet by mouth daily 22  Yes Deepali Oates DO   rosuvastatin (CRESTOR) 5 MG tablet Take 1 tablet by mouth daily 22  Yes Deepali Oates DO   amitriptyline (ELAVIL) 25 MG tablet Take 1 tablet by mouth nightly 22  Yes Deepali Oates DO   busPIRone (BUSPAR) 5 MG tablet Take 1 tablet by mouth 2 times daily 22  Yes Deepali Oates DO   metFORMIN (GLUCOPHAGE-XR) 500 MG extended release tablet 2 morning  2 dinner 22  Yes Kristy Gutierrez DO          HPI: He presents today with diabetes hypertension history of brain injury and continues to progress well with therapy and current treatment. Referred by  to Dr. Sade Pantoja and we are awaiting full report. As reviewed today as prescribed. Systems review stable. Review of Systems   Constitutional: Negative. HENT: Negative. Eyes: Negative. Respiratory: Negative. Gastrointestinal: Negative. Endocrine: Negative. Genitourinary: Negative. Musculoskeletal: Negative. Skin: Negative. Allergic/Immunologic: Negative. Neurological: Negative. Hematological: Negative. Psychiatric/Behavioral: Negative.               Current Outpatient Medications:     NONFORMULARY, Neurotrophin PMG, Disp: , Rfl:     Omega-3 Fatty Acids (OMEGA-3 FISH OIL PO), Take by mouth, Disp: , Rfl:     TURMERIC PO, Take by mouth, Disp: , Rfl:     NONFORMULARY, Cataplex G, Disp: , Rfl:     NONFORMULARY, Asura Brain, Disp: , Rfl:     Lancets MISC, 1 each by Does not apply route daily, Disp: 300 each, Rfl: 1    blood glucose monitor strips, Test one times a day Freestyle Freedom Lyte, Disp: 400 strip, Rfl: 1    desvenlafaxine succinate (PRISTIQ) 100 MG TB24 extended release tablet, TAKE 1 TABLET DAILY, Disp: 14 tablet, Rfl: 0    acetaminophen (TYLENOL) 325 MG tablet, Take 650 mg by mouth every 6 hours as needed for Pain, Disp: , Rfl:     Melatonin 10 MG TABS, Take by mouth, Disp: , Rfl:     pantoprazole (PROTONIX) 40 MG tablet, Take 1 tablet by mouth daily, Disp: 90 tablet, Rfl: 3    rosuvastatin (CRESTOR) 5 MG tablet, Take 1 tablet by mouth daily, Disp: 90 tablet, Rfl: 3    amitriptyline (ELAVIL) 25 MG tablet, Take 1 tablet by mouth nightly, Disp: 90 tablet, Rfl: 3    busPIRone (BUSPAR) 5 MG tablet, Take 1 tablet by mouth 2 times daily, Disp: 180 tablet, Rfl: 1    metFORMIN (GLUCOPHAGE-XR) 500 MG extended release tablet, 2 morning  2 dinner, Disp: 360 tablet, Rfl: 3    No Known Allergies    Social History     Tobacco Use    Smoking status: Never    Smokeless tobacco: Never   Vaping Use    Vaping Use: Never used   Substance Use Topics    Alcohol use: Not Currently    Drug use: Not Currently      Past Surgical History:   Procedure Laterality Date    TONSILLECTOMY      at age 9     Family History   Problem Relation Age of Onset Diabetes Father     Heart Disease Father      Past Medical History:   Diagnosis Date    Depression     Diabetes mellitus ([facility] [address]     Gastritis     Hyperlipidemia        Vitals:    07/28/22 0956   BP: 128/86   Pulse: 74   Temp: 97.4 °F (36.3 °C)   SpO2: 97%   Weight: 225 lb (102.1 kg)   Height: 5' 3\" (1.6 m)     BP Readings from Last 3 Encounters:   07/28/22 128/86   06/07/22 124/80   05/10/22 138/80        Physical Exam  Vitals and nursing note reviewed. Constitutional:       Appearance: She is well-developed. HENT:      Head: Normocephalic. Right Ear: External ear normal.      Left Ear: External ear normal.      Nose: Nose normal.   Eyes:      Conjunctiva/sclera: Conjunctivae normal.      Pupils: Pupils are equal, round, and reactive to light. Cardiovascular:      Rate and Rhythm: Normal rate. Pulmonary:      Breath sounds: Normal breath sounds. Abdominal:      General: Bowel sounds are normal.      Palpations: Abdomen is soft. Musculoskeletal:         General: Normal range of motion. Cervical back: Normal range of motion and neck supple. Skin:     General: Skin is warm and dry. Neurological:      Mental Status: She is alert and oriented to person, place, and time. Psychiatric:         Behavior: Behavior normal.      Today's vitals physical exam stable. Heart is controlled lungs are clear. Medications reviewed as prescribed. Follow-up visit with me next month sooner if problems. Blood work prior. Plan Per Assessment:  Mckenna Alanis was seen today for hypertension. Diagnoses and all orders for this visit:    Type 2 diabetes mellitus without complication, without long-term current use of insulin ([facility])  -     Microalbumin / creatinine urine ratio; Future    Essential hypertension  -     Microalbumin / creatinine urine ratio;  Future    SAH (subarachnoid hemorrhage) ([facility])    SDH (subdural hematoma) ([facility])    Anxiety    Depression, unspecified depression type          No follow-ups on file.      Clintfie Rising, DO    Note was generated with the assistance of voice recognition software. Document was reviewed however may contain grammatical errors.

## 2022-08-03 RX ORDER — DESVENLAFAXINE 100 MG/1
TABLET, EXTENDED RELEASE ORAL
Qty: 90 TABLET | Refills: 3 | Status: SHIPPED
Start: 2022-08-03 | End: 2022-08-18 | Stop reason: SDUPTHER

## 2022-08-11 RX ORDER — BUSPIRONE HYDROCHLORIDE 5 MG/1
TABLET ORAL
Qty: 180 TABLET | Refills: 3 | Status: SHIPPED | OUTPATIENT
Start: 2022-08-11

## 2022-08-18 RX ORDER — DESVENLAFAXINE 100 MG/1
100 TABLET, EXTENDED RELEASE ORAL DAILY
Qty: 90 TABLET | Refills: 1 | Status: SHIPPED | OUTPATIENT
Start: 2022-08-18

## 2022-08-30 ENCOUNTER — OFFICE VISIT (OUTPATIENT)
Dept: PRIMARY CARE CLINIC | Age: 57
End: 2022-08-30
Payer: COMMERCIAL

## 2022-08-30 VITALS
BODY MASS INDEX: 39.68 KG/M2 | HEART RATE: 71 BPM | WEIGHT: 224 LBS | SYSTOLIC BLOOD PRESSURE: 130 MMHG | TEMPERATURE: 98.3 F | OXYGEN SATURATION: 97 % | DIASTOLIC BLOOD PRESSURE: 82 MMHG

## 2022-08-30 DIAGNOSIS — E78.5 HYPERLIPIDEMIA, UNSPECIFIED HYPERLIPIDEMIA TYPE: ICD-10-CM

## 2022-08-30 DIAGNOSIS — F07.81 POST CONCUSSION SYNDROME: ICD-10-CM

## 2022-08-30 DIAGNOSIS — I10 ESSENTIAL HYPERTENSION: Primary | ICD-10-CM

## 2022-08-30 DIAGNOSIS — E11.9 TYPE 2 DIABETES MELLITUS WITHOUT COMPLICATION, WITHOUT LONG-TERM CURRENT USE OF INSULIN (HCC): ICD-10-CM

## 2022-08-30 PROCEDURE — 3044F HG A1C LEVEL LT 7.0%: CPT | Performed by: FAMILY MEDICINE

## 2022-08-30 PROCEDURE — 99214 OFFICE O/P EST MOD 30 MIN: CPT | Performed by: FAMILY MEDICINE

## 2022-08-30 ASSESSMENT — ENCOUNTER SYMPTOMS
ALLERGIC/IMMUNOLOGIC NEGATIVE: 1
RESPIRATORY NEGATIVE: 1
GASTROINTESTINAL NEGATIVE: 1
EYES NEGATIVE: 1

## 2022-08-30 NOTE — PROGRESS NOTES
22     Amara Bañuelos    : 1965 Sex: female   Age: 61 y.o. Chief Complaint   Patient presents with    Memory Loss     Worried about speech     Diabetes    Diarrhea     Periodically gets it when she is too tired        Prior to Admission medications    Medication Sig Start Date End Date Taking? Authorizing Provider   desvenlafaxine succinate (PRISTIQ) 100 MG TB24 extended release tablet Take 1 tablet by mouth daily 22  Yes Hannah Oates DO   busPIRone (BUSPAR) 5 MG tablet TAKE 1 TABLET TWICE A DAY 22  Yes Tavon Boswell DO   NONFORMULARY Neurotrophin PMG   Yes Historical Provider, MD   Omega-3 Fatty Acids (OMEGA-3 FISH OIL PO) Take by mouth   Yes Historical Provider, MD   TURMERIC PO Take by mouth   Yes Historical Provider, MD   NONFORMULARY Cataplex G   Yes Historical Provider, MD   NONFORMULARY Asura Brain   Yes Historical Provider, MD   Lancets MISC 1 each by Does not apply route daily 5/10/22  Yes Hannah Oates DO   blood glucose monitor strips Test one times a day Freestyle [place] Lyte 5/10/22  Yes Hannah Oates DO   acetaminophen (TYLENOL) 325 MG tablet Take 650 mg by mouth every 6 hours as needed for Pain   Yes Historical Provider, MD   Melatonin 10 MG TABS Take by mouth   Yes Historical Provider, MD   pantoprazole (PROTONIX) 40 MG tablet Take 1 tablet by mouth daily 22  Yes Hannah Oates DO   rosuvastatin (CRESTOR) 5 MG tablet Take 1 tablet by mouth daily 22  Yes Hannah Oates DO   amitriptyline (ELAVIL) 25 MG tablet Take 1 tablet by mouth nightly 22  Yes Hannah Oates DO   metFORMIN (GLUCOPHAGE-XR) 500 MG extended release tablet 2 morning  2 dinner 22  Yes Tavon Boswell DO          HPI: Patient evaluated today with hypertension diabetes postconcussion syndrome hyperlipidemia . Continued complaints of memory difficulties.   She was evaluated by [facility] and recommendations for speech therapy cognitive behavioral therapy were suggested. Referral made to speech therapy today and she will also be following up with Dr. Ant Jaquez          Review of Systems   Constitutional: Negative. HENT: Negative. Eyes: Negative. Respiratory: Negative. Gastrointestinal: Negative. Endocrine: Negative. Genitourinary: Negative. Musculoskeletal: Negative. Skin: Negative. Allergic/Immunologic: Negative. Neurological: Negative. Hematological: Negative. Psychiatric/Behavioral: Negative. Today systems review stable medications as prescribed.         Current Outpatient Medications:     desvenlafaxine succinate (PRISTIQ) 100 MG TB24 extended release tablet, Take 1 tablet by mouth daily, Disp: 90 tablet, Rfl: 1    busPIRone (BUSPAR) 5 MG tablet, TAKE 1 TABLET TWICE A DAY, Disp: 180 tablet, Rfl: 3    NONFORMULARY, Neurotrophin PMG, Disp: , Rfl:     Omega-3 Fatty Acids (OMEGA-3 FISH OIL PO), Take by mouth, Disp: , Rfl:     TURMERIC PO, Take by mouth, Disp: , Rfl:     NONFORMULARY, Cataplex G, Disp: , Rfl:     NONFORMULARY, Asura Brain, Disp: , Rfl:     Lancets MISC, 1 each by Does not apply route daily, Disp: 300 each, Rfl: 1    blood glucose monitor strips, Test one times a day Freestyle [place] Lyte, Disp: 400 strip, Rfl: 1    acetaminophen (TYLENOL) 325 MG tablet, Take 650 mg by mouth every 6 hours as needed for Pain, Disp: , Rfl:     Melatonin 10 MG TABS, Take by mouth, Disp: , Rfl:     pantoprazole (PROTONIX) 40 MG tablet, Take 1 tablet by mouth daily, Disp: 90 tablet, Rfl: 3    rosuvastatin (CRESTOR) 5 MG tablet, Take 1 tablet by mouth daily, Disp: 90 tablet, Rfl: 3    amitriptyline (ELAVIL) 25 MG tablet, Take 1 tablet by mouth nightly, Disp: 90 tablet, Rfl: 3    metFORMIN (GLUCOPHAGE-XR) 500 MG extended release tablet, 2 morning  2 dinner, Disp: 360 tablet, Rfl: 3    No Known Allergies    Social History     Tobacco Use    Smoking status: Never    Smokeless tobacco: Never   Vaping Use    Vaping Use: Never used Substance Use Topics    Alcohol use: Not Currently    Drug use: Not Currently      Past Surgical History:   Procedure Laterality Date    TONSILLECTOMY      at age 9     Family History   Problem Relation Age of Onset    Diabetes Father     Heart Disease Father      Past Medical History:   Diagnosis Date    Depression     Diabetes mellitus ([address]     Gastritis     Hyperlipidemia        Vitals:    08/30/22 1531   BP: 130/82   Pulse: 71   Temp: 98.3 °F (36.8 °C)   SpO2: 97%   Weight: 224 lb (101.6 kg)     BP Readings from Last 3 Encounters:   08/30/22 130/82   07/28/22 128/86   06/07/22 124/80        Physical Exam  Vitals and nursing note reviewed. Constitutional:       Appearance: She is well-developed. HENT:      Head: Normocephalic. Right Ear: External ear normal.      Left Ear: External ear normal.      Nose: Nose normal.   Eyes:      Conjunctiva/sclera: Conjunctivae normal.      Pupils: Pupils are equal, round, and reactive to light. Cardiovascular:      Rate and Rhythm: Normal rate. Pulmonary:      Breath sounds: Normal breath sounds. Abdominal:      General: Bowel sounds are normal.      Palpations: Abdomen is soft. Musculoskeletal:         General: Normal range of motion. Cervical back: Normal range of motion and neck supple. Skin:     General: Skin is warm and dry. Neurological:      Mental Status: She is alert and oriented to person, place, and time. Psychiatric:         Behavior: Behavior normal.   Today's vitals physical examination stable. I will maintain current meds and care. Encourage continuing with home therapy strengthening and proper rest.  Recommended following through with speech therapy evaluation. Follow-up with me next month. Plan Per Assessment:  Laure Trujillo was seen today for memory loss, diabetes and diarrhea.     Diagnoses and all orders for this visit:    Essential hypertension    Type 2 diabetes mellitus without complication, without long-term current use of insulin ([facility]    Post concussion syndrome  -     [facility] - [facility], [place], [place]/Wellness    Hyperlipidemia, unspecified hyperlipidemia type          Return in about 1 month (around 9/30/2022). Vaibhav Seth DO    Note was generated with the assistance of voice recognition software. Document was reviewed however may contain grammatical errors.

## 2022-10-11 ENCOUNTER — HOSPITAL ENCOUNTER (OUTPATIENT)
Dept: SPEECH THERAPY | Age: 57
Setting detail: THERAPIES SERIES
Discharge: HOME | End: 2022-10-11
Payer: COMMERCIAL

## 2022-10-11 PROCEDURE — 96125 COGNITIVE TEST BY HC PRO: CPT

## 2022-10-11 NOTE — PROGRESS NOTES
[facility]  Outpatient Speech Therapy  Phone: [phone] Fax: [phone]     SPEECH-LANGUAGE PATHOLOGY  COGNITIVE EVALUATION and PLAN OF CARE    PATIENT NAME:  Meenakshi Choe  (female)     MRN:  [id number]    :  1965  (61 y.o.)  STATUS:  Outpatient clinic   TODAY'S DATE:  10/11/2022  REFERRING PROVIDER:  Dr. Viridiana Oates  NPI: [id number]       SPECIFIC PROVIDER ORDER: [facility]-Speech Therapy  Date of order:  22  EVALUATING THERAPIST: PIYUSH Larkin    CERTIFICATION/RECERTIFICATION PERIOD: 10/11/2022 to 1/3/23  INSURANCE PROVIDER:  Payor: [address] [place] [address] / Plan: [address] [place] [address] / Product Type: *No Product type* /    INSURANCE ID:  [id number] - (Commercial)   SECONDARY INSURANCE (if applicable):        CPT Codes  EVALUATION: 14004  standardized cognitive performance testing (per hour)    TREATMENT:  Requesting treatment authorization for  12 visits over 12 weeks focusing on the following CPT codes:      53236  therapeutic interventions that focus on cognitive function , initial  15 min  94250  therapeutic interventions that focus on cognitive function, each additional 15 min  38596  speech/language tx     REFERRING/TREATMENT DIAGNOSIS: Postconcussional syndrome [F07.81]          SPEECH THERAPY  PLAN OF CARE   The speech therapy  POC is established based on physician order, speech pathology diagnosis and results of clinical assessment     SPEECH PATHOLOGY DIAGNOSIS:      Mild cognitive impairment    Outpatient Speech Pathology intervention is recommended 1 time  per week for the above certification period. Conditions Requiring Skilled Therapeutic Intervention for speech, language and/or cognition    Cognitive linguistic impairment  Decreased short term memory  Decreased problem solving skills     Specific Speech Therapy Interventions to Include:    Therapeutic tasks for Cognition    Specific instructions for next treatment: To initiate POC    SHORT/LONG TERM GOALS:  1. Patient will improve recent memory including temporal and spatial orientation to a modified independent level of assistance level with greater than 90% accuracy with the use of a memory log/calendar aid   2. Patient will complete the Assessment for Language Related Functional Activities   3. Patient will identify and spontaneously use compensatory techniques to assist with memory with greater than 90% accuracy  4. Patient will improve problem solving for functional activities such as financial, medication, and schedule management to a modified independent level of assistance level with greater than 90% accuracy and the use of compensatory aids/strategies. Patient goals: Patient/family involved in developing goals and treatment plan:   Treatment goals discussed with Patient    The Patient understand(s) the diagnosis, prognosis and plan of care   The patient agreed with above     This plan may be re-evaluated and revised as warranted. Rehabilitation Potential/Prognosis: good                           SIGNIFICANT INFORMATION:  Caren Anaya was referred by Dr. Yamilex Faust. Иван to [address] [place] [address]'s Outpatient Speech Pathology Department for cognitive evaluation and treatment due to a diagnosis of post concussion syndrome (F07.81). Patient was unaccompanied to the session and provided her own medical history. Patient reported that in February of 2022, she was taking out the garbage and slipped on the ice. She fell and hit the back of her head. She was taken to the ER. Patient reported that she had a brain bleed. She reported that post injury, she gets overwhelmed with too much activity or noise, gets fatigued after driving longer distances, can not mow the grass within a reasonable time due to exhaustion (took her three days to mow her lot), gets headaches more frequently, and has difficulty processing.   She also had vision changes and was seen in [facility] for vision therapy. Patient reported that she completed high school and 2 years of college at [employer]. She works at [employer]. 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 Patient lives with her mother. She has 4 adult children and 3 grandchildren. She manages her own finances, medications, and schedule. She uses compensations for cognitive deficits such as a calendar aid and headphones to cancel out noise in the environment. Patient is in counseling. She reported a history of depression and anxiety. She is also diabetic. COGNITIVE EVALUATION:    At the time of initial evaluation on 10/11/22, Ms. Sesay was evaluated using the Segment (RIPA-2). Results of the assessment will be indicated below:        Subtest Raw Score Percentile Standard Score Severity          Immediate Memory   28 95 15 Mild-WFL   Recent Memory   29 91 14 Mild-WFL   Temporal Orientation  (Recent Memory) 30 91 14 Mild-WFL   Temporal Orientation  (Remote Memory) 28 75 12 moderate   Spatial Orientation   30 91 14 Mild-WFL   Orientation to Environment 29 63 11 moderate   Recall of General Information 30 95 15 Mild-WFL   Problem Solving & Abstract Reasoning 27 84 13 moderate   Organization   30 >99 18 Mild-WFL   Auditory Processing & Retention 30 91 14 Mild-WFL       VARIANT OBSERVATIONS     Present Absent   Error (e) x    Perseveration (p)  x   Repeat Instructions/Stimulus (r) x    Denial/Refusal (d)  x   Delayed Response (dl) x    Confabulation (c)  x   Partially Correct (pc) x    Irrelevant (i)  x   Tangential (t)  x   Self-corrected (sc) x                                   EDUCATION:   The Speech Language Pathologist (SLP) completed education regarding results of evaluation and that intervention is warranted at this time.   Learner: Patient  Education: Reviewed results and recommendations of this evaluation  Evaluation of Education: Verbalizes understanding    This plan may be re-evaluated and revised as warranted. Treatment goals discussed with Patient   The Patient understand(s) the diagnosis, prognosis and plan of care     Evaluation Time includes thorough review of current medical information, gathering information on past medical history/social history and prior level of function, completion of standardized testing/informal observation of tasks, assessment of data and education on plan of care and goals. The admitting diagnosis and active problem list, as listed below have been reviewed prior to initiation of this evaluation. ACTIVE PROBLEM LIST:   Patient Active Problem List   Diagnosis    Chest pain    Gastroesophageal reflux disease with esophagitis    Positive depression screening    Essential hypertension    Hyperlipidemia    Depression    Anxiety    Impaired fasting glucose    Primary osteoarthritis of right knee    Type 2 diabetes mellitus without complication, without long-term current use of insulin (HCC)    Trauma    SDH (subdural hematoma)    SAH (subarachnoid hemorrhage) (HCC)    Subarachnoid hemorrhage (HCC)    Fatigue    Nausea    Post concussion syndrome       Shila Almaraz M.S., CCC-SLP/L  Speech-Language Pathologist      [facility]     Phone: [phone]     If you have any questions or concerns, please don't hesitate to call. Thank you for your referral.    Physician/Provider Signature:________________________________Date:__________________  By signing above, the therapists plan is approved by the physician/provider. All patients under [employer] must be signed by physician/provider.

## 2022-10-19 ENCOUNTER — HOSPITAL ENCOUNTER (OUTPATIENT)
Dept: SPEECH THERAPY | Age: 57
Setting detail: THERAPIES SERIES
Discharge: HOME | End: 2022-10-19
Payer: COMMERCIAL

## 2022-10-19 PROCEDURE — 97129 THER IVNTJ 1ST 15 MIN: CPT

## 2022-10-19 PROCEDURE — 97130 THER IVNTJ EA ADDL 15 MIN: CPT

## 2022-10-19 NOTE — PROGRESS NOTES
was seen for cognitive therapy. Student SLP provided treatment under the supervising SLP. Formalized assessment of cognition was completed via the Assessment of Language-Related Functional Activities (RUBINA). This assessment measures the patient's ability to complete functional cognitive-language activities of daily living. An independent functioning rating of 1 indicates a high probability of independent functioning on that task. A score of 2 indicates the need for some level of assistance on that task. A score of 3 indicates a high probability that the patient is not able to function independently on that task assessed. Results of the assessment will be indicated below. SUBTEST PERCENT CORRECT INDEPENDENT FUNCTIONING RATING   1. Telling Time 80 1   2. Counting Money 100 1   3. Addressing an Envelope 100 1   4. Daily Math Problems 100 1   5. Writing a Check       Balancing a Checkbook 80 1   6. Understanding Medicine Labels 100 1   7. Understanding a Calendar 90 1   8. Reading Instructions DNT DNT   9. Using a Telephone DNT DNT   10.  Writing Phone Messages DNT DNT      The RUBINA assessment will be completed during the next session. Ms. Latrell Armstrong reported that she experienced increased difficulty with her memory and other cognitive functioning on the day previous to the session (10/18/22). She also forgot her purse in the treatment room at the end of the session. The clinician gave her purse to her before she left the facility.      Osmar Allan  Student Speech-Language Pathologist    CPT CODE:       54832  therapeutic interventions that focus on cognitive function , initial  15 min  78928  therapeutic interventions that focus on cognitive function, each additional 15 min

## 2022-10-25 DIAGNOSIS — F32.A DEPRESSION, UNSPECIFIED DEPRESSION TYPE: ICD-10-CM

## 2022-10-25 DIAGNOSIS — I10 ESSENTIAL HYPERTENSION: ICD-10-CM

## 2022-10-25 DIAGNOSIS — E11.9 TYPE 2 DIABETES MELLITUS WITHOUT COMPLICATION, WITHOUT LONG-TERM CURRENT USE OF INSULIN (HCC): ICD-10-CM

## 2022-10-25 LAB
ALBUMIN SERPL-MCNC: 4.5 G/DL (ref 3.5–5.2)
ALP BLD-CCNC: 87 U/L (ref 35–104)
ALT SERPL-CCNC: 55 U/L (ref 0–32)
ANION GAP SERPL CALCULATED.3IONS-SCNC: 13 MMOL/L (ref 7–16)
AST SERPL-CCNC: 36 U/L (ref 0–31)
BASOPHILS ABSOLUTE: 0.06 E9/L (ref 0–0.2)
BASOPHILS RELATIVE PERCENT: 0.9 % (ref 0–2)
BILIRUB SERPL-MCNC: 0.4 MG/DL (ref 0–1.2)
BUN BLDV-MCNC: 13 MG/DL (ref 6–20)
CALCIUM SERPL-MCNC: 9.3 MG/DL (ref 8.6–10.2)
CHLORIDE BLD-SCNC: 103 MMOL/L (ref 98–107)
CHOLESTEROL, TOTAL: 151 MG/DL (ref 0–199)
CO2: 25 MMOL/L (ref 22–29)
CREAT SERPL-MCNC: 1 MG/DL (ref 0.5–1)
EOSINOPHILS ABSOLUTE: 0.41 E9/L (ref 0.05–0.5)
EOSINOPHILS RELATIVE PERCENT: 6.2 % (ref 0–6)
GFR SERPL CREATININE-BSD FRML MDRD: >60 ML/MIN/1.73
GLUCOSE BLD-MCNC: 146 MG/DL (ref 74–99)
HBA1C MFR BLD: 5.9 % (ref 4–5.6)
HCT VFR BLD CALC: 39.7 % (ref 34–48)
HDLC SERPL-MCNC: 37 MG/DL
HEMOGLOBIN: 13.4 G/DL (ref 11.5–15.5)
IMMATURE GRANULOCYTES #: 0.03 E9/L
IMMATURE GRANULOCYTES %: 0.5 % (ref 0–5)
LDL CHOLESTEROL CALCULATED: 80 MG/DL (ref 0–99)
LYMPHOCYTES ABSOLUTE: 1.35 E9/L (ref 1.5–4)
LYMPHOCYTES RELATIVE PERCENT: 20.4 % (ref 20–42)
MCH RBC QN AUTO: 31.5 PG (ref 26–35)
MCHC RBC AUTO-ENTMCNC: 33.8 % (ref 32–34.5)
MCV RBC AUTO: 93.2 FL (ref 80–99.9)
MONOCYTES ABSOLUTE: 0.58 E9/L (ref 0.1–0.95)
MONOCYTES RELATIVE PERCENT: 8.8 % (ref 2–12)
NEUTROPHILS ABSOLUTE: 4.19 E9/L (ref 1.8–7.3)
NEUTROPHILS RELATIVE PERCENT: 63.2 % (ref 43–80)
PDW BLD-RTO: 13.5 FL (ref 11.5–15)
PLATELET # BLD: 332 E9/L (ref 130–450)
PMV BLD AUTO: 9.4 FL (ref 7–12)
POTASSIUM SERPL-SCNC: 4.9 MMOL/L (ref 3.5–5)
RBC # BLD: 4.26 E12/L (ref 3.5–5.5)
SODIUM BLD-SCNC: 141 MMOL/L (ref 132–146)
T4 TOTAL: 6.5 MCG/DL (ref 4.5–11.7)
TOTAL PROTEIN: 7.4 G/DL (ref 6.4–8.3)
TRIGL SERPL-MCNC: 170 MG/DL (ref 0–149)
TSH SERPL DL<=0.05 MIU/L-ACNC: 1.45 UIU/ML (ref 0.27–4.2)
VLDLC SERPL CALC-MCNC: 34 MG/DL
WBC # BLD: 6.6 E9/L (ref 4.5–11.5)

## 2022-10-26 ENCOUNTER — APPOINTMENT (OUTPATIENT)
Dept: SPEECH THERAPY | Age: 57
End: 2022-10-26
Payer: COMMERCIAL

## 2022-10-27 ENCOUNTER — OFFICE VISIT (OUTPATIENT)
Dept: PRIMARY CARE CLINIC | Age: 57
End: 2022-10-27
Payer: COMMERCIAL

## 2022-10-27 VITALS
HEART RATE: 80 BPM | HEIGHT: 63 IN | OXYGEN SATURATION: 98 % | DIASTOLIC BLOOD PRESSURE: 82 MMHG | SYSTOLIC BLOOD PRESSURE: 124 MMHG | BODY MASS INDEX: 40.22 KG/M2 | TEMPERATURE: 98 F | WEIGHT: 227 LBS

## 2022-10-27 DIAGNOSIS — I10 ESSENTIAL HYPERTENSION: Primary | ICD-10-CM

## 2022-10-27 DIAGNOSIS — S06.5XAA SDH (SUBDURAL HEMATOMA): ICD-10-CM

## 2022-10-27 DIAGNOSIS — F41.9 ANXIETY: ICD-10-CM

## 2022-10-27 DIAGNOSIS — F32.A DEPRESSION, UNSPECIFIED DEPRESSION TYPE: ICD-10-CM

## 2022-10-27 DIAGNOSIS — E11.9 TYPE 2 DIABETES MELLITUS WITHOUT COMPLICATION, WITHOUT LONG-TERM CURRENT USE OF INSULIN (HCC): ICD-10-CM

## 2022-10-27 DIAGNOSIS — I60.9 SAH (SUBARACHNOID HEMORRHAGE) (HCC): ICD-10-CM

## 2022-10-27 DIAGNOSIS — F07.81 POST CONCUSSION SYNDROME: ICD-10-CM

## 2022-10-27 PROCEDURE — 3044F HG A1C LEVEL LT 7.0%: CPT | Performed by: FAMILY MEDICINE

## 2022-10-27 PROCEDURE — 99214 OFFICE O/P EST MOD 30 MIN: CPT | Performed by: FAMILY MEDICINE

## 2022-10-27 PROCEDURE — 3074F SYST BP LT 130 MM HG: CPT | Performed by: FAMILY MEDICINE

## 2022-10-27 PROCEDURE — 3078F DIAST BP <80 MM HG: CPT | Performed by: FAMILY MEDICINE

## 2022-10-27 ASSESSMENT — ENCOUNTER SYMPTOMS
GASTROINTESTINAL NEGATIVE: 1
RESPIRATORY NEGATIVE: 1
ALLERGIC/IMMUNOLOGIC NEGATIVE: 1
EYES NEGATIVE: 1

## 2022-10-27 NOTE — PROGRESS NOTES
[place]          Review of Systems   Constitutional:  Positive for fatigue. HENT: Negative. Eyes: Negative. Respiratory: Negative. Gastrointestinal: Negative. Endocrine: Negative. Genitourinary: Negative. Musculoskeletal: Negative. Skin: Negative. Allergic/Immunologic: Negative. Neurological: Negative. Hematological: Negative. Psychiatric/Behavioral: Negative.               Current Outpatient Medications:     desvenlafaxine succinate (PRISTIQ) 100 MG TB24 extended release tablet, Take 1 tablet by mouth daily, Disp: 90 tablet, Rfl: 1    busPIRone (BUSPAR) 5 MG tablet, TAKE 1 TABLET TWICE A DAY, Disp: 180 tablet, Rfl: 3    NONFORMULARY, Neurotrophin PMG, Disp: , Rfl:     Omega-3 Fatty Acids (OMEGA-3 FISH OIL PO), Take by mouth, Disp: , Rfl:     TURMERIC PO, Take by mouth, Disp: , Rfl:     NONFORMULARY, Cataplex G, Disp: , Rfl:     NONFORMULARY, Asura Brain, Disp: , Rfl:     Lancets MISC, 1 each by Does not apply route daily, Disp: 300 each, Rfl: 1    blood glucose monitor strips, Test one times a day Freestyle [place] Lyte, Disp: 400 strip, Rfl: 1    acetaminophen (TYLENOL) 325 MG tablet, Take 650 mg by mouth every 6 hours as needed for Pain, Disp: , Rfl:     Melatonin 10 MG TABS, Take by mouth, Disp: , Rfl:     pantoprazole (PROTONIX) 40 MG tablet, Take 1 tablet by mouth daily, Disp: 90 tablet, Rfl: 3    rosuvastatin (CRESTOR) 5 MG tablet, Take 1 tablet by mouth daily, Disp: 90 tablet, Rfl: 3    amitriptyline (ELAVIL) 25 MG tablet, Take 1 tablet by mouth nightly, Disp: 90 tablet, Rfl: 3    metFORMIN (GLUCOPHAGE-XR) 500 MG extended release tablet, 2 morning  2 dinner, Disp: 360 tablet, Rfl: 3    No Known Allergies    Social History     Tobacco Use    Smoking status: Never    Smokeless tobacco: Never   Vaping Use    Vaping Use: Never used   Substance Use Topics    Alcohol use: Not Currently    Drug use: Not Currently      Past Surgical History:   Procedure Laterality Date TONSILLECTOMY      at age 9     Family History   Problem Relation Age of Onset    Diabetes Father     Heart Disease Father      Past Medical History:   Diagnosis Date    Depression     Diabetes mellitus ([address]     Gastritis     Hyperlipidemia        Vitals:    10/27/22 1143   BP: 124/82   Pulse: 80   Temp: 98 °F (36.7 °C)   SpO2: 98%   Weight: 227 lb (103 kg)   Height: 5' 3\" (1.6 m)     BP Readings from Last 3 Encounters:   10/27/22 124/82   08/30/22 130/82   07/28/22 128/86        Physical Exam  Vitals and nursing note reviewed. Constitutional:       Appearance: She is well-developed. HENT:      Head: Normocephalic. Right Ear: External ear normal.      Left Ear: External ear normal.      Nose: Nose normal.   Eyes:      Conjunctiva/sclera: Conjunctivae normal.      Pupils: Pupils are equal, round, and reactive to light. Cardiovascular:      Rate and Rhythm: Normal rate. Pulmonary:      Breath sounds: Normal breath sounds. Abdominal:      General: Bowel sounds are normal.      Palpations: Abdomen is soft. Musculoskeletal:         General: Normal range of motion. Cervical back: Normal range of motion and neck supple. Skin:     General: Skin is warm and dry. Neurological:      Mental Status: She is alert and oriented to person, place, and time. Psychiatric:         Behavior: Behavior normal.   Today's vitals physical examination is overall stable. Medications to continue as prescribed. Reassessment with me again in 6 weeks sooner if problems. Blood work again in 3 months. Current labs reviewed and stable.   Lab Results   Component Value Date    TSH 1.450 10/25/2022    TSH 1.510 11/30/2021    Z4FYKGM 94.2 09/20/2013    H3KYKXZ 6.5 10/25/2022    C9MXNGH 5.6 11/30/2021     Lab Results   Component Value Date    CHOL 151 10/25/2022    CHOL 158 04/12/2022     Lab Results   Component Value Date    TRIG 170 (H) 10/25/2022    TRIG 173 (H) 04/12/2022     Lab Results   Component Value Date    HDL 37 10/25/2022    HDL 33 04/12/2022     No results found for: [facility]  Lab Results   Component Value Date    LABVLDL 34 10/25/2022    LABVLDL 35 04/12/2022     Lab Results   Component Value Date    CHOLHDLRATIO 5 01/22/2021    CHOLHDLRATIO 5 06/08/2020     Lab Results   Component Value Date    WBC 6.6 10/25/2022    HGB 13.4 10/25/2022    HCT 39.7 10/25/2022    MCV 93.2 10/25/2022     10/25/2022    LYMPHOPCT 20.4 10/25/2022    RBC 4.26 10/25/2022    MCH 31.5 10/25/2022    MCHC 33.8 10/25/2022    RDW 13.5 10/25/2022     Lab Results   Component Value Date     10/25/2022    K 4.9 10/25/2022     10/25/2022    CO2 25 10/25/2022    BUN 13 10/25/2022    CREATININE 1.0 10/25/2022    GLUCOSE 146 (H) 10/25/2022    CALCIUM 9.3 10/25/2022    PROT 7.4 10/25/2022    LABALBU 4.5 10/25/2022    BILITOT 0.4 10/25/2022    ALKPHOS 87 10/25/2022    AST 36 (H) 10/25/2022    ALT 55 (H) 10/25/2022    LABGLOM >60 10/25/2022    GFRAA >60 04/12/2022      No results found for: PSA, PSADIA   Lab Results   Component Value Date    LABA1C 5.9 (H) 10/25/2022    LABA1C 6.3 (H) 04/12/2022    LABA1C 7.9 (H) 01/11/2022     No results found for: EAG            Plan Per Assessment:  Radha Salamanca was seen today for discuss labs, diabetes, hypertension, speech problem and ear problem. Diagnoses and all orders for this visit:    Essential hypertension    Type 2 diabetes mellitus without complication, without long-term current use of insulin (HCC)    Post concussion syndrome    SAH (subarachnoid hemorrhage) (HCC)    SDH (subdural hematoma)    Depression, unspecified depression type    Anxiety          No follow-ups on file. Lucetta Bosworth, DO    Note was generated with the assistance of voice recognition software. Document was reviewed however may contain grammatical errors.

## 2022-11-02 ENCOUNTER — HOSPITAL ENCOUNTER (OUTPATIENT)
Dept: SPEECH THERAPY | Age: 57
Setting detail: THERAPIES SERIES
Discharge: HOME | End: 2022-11-02
Payer: COMMERCIAL

## 2022-11-02 PROCEDURE — 97130 THER IVNTJ EA ADDL 15 MIN: CPT

## 2022-11-02 PROCEDURE — 97129 THER IVNTJ 1ST 15 MIN: CPT

## 2022-11-02 NOTE — PROGRESS NOTES
was seen for cognitive therapy. Student SLP provided treatment under the supervising SLP. Formalized assessment of cognition was completed via the Assessment of Language-Related Functional Activities (RUBINA). This assessment measures the patient's ability to complete functional cognitive-language activities of daily living. An independent functioning rating of 1 indicates a high probability of independent functioning on that task. A score of 2 indicates the need for some level of assistance on that task. A score of 3 indicates a high probability that the patient is not able to function independently on that task assessed. Results of the assessment will be indicated below. SUBTEST PERCENT CORRECT INDEPENDENT FUNCTIONING RATING   1. Telling Time 80 1   2. Counting Money 100 1   3. Addressing an Envelope 100 1   4. Daily Math Problems 100 1   5. Writing a Check       Balancing a Checkbook 80 1   6. Understanding Medicine Labels 100 1   7. Understanding a Calendar 90 1   8. Reading Instructions 90 1   9. Using a Telephone 100 1   10. Writing Phone Messages 95 1      Ms. Sesay's RUBINA scores indicate that she demonstrates the ability to independently function on daily tasks. However, she reports difficulty during her work day and completing multiple tasks at once. Ms. Jaleel Alonso reported that she experienced increased difficulty with her memory and other cognitive functioning during her work day when she encounters multiple stimulus at one time. She stated that she can become overwhelmed by multiple tasks.      Saranya Walker  Student Speech-Language Pathologist    CPT CODE:       95250  therapeutic interventions that focus on cognitive function , initial  15 min  10681  therapeutic interventions that focus on cognitive function, each additional 15 min

## 2022-11-09 ENCOUNTER — HOSPITAL ENCOUNTER (OUTPATIENT)
Dept: SPEECH THERAPY | Age: 57
Setting detail: THERAPIES SERIES
Discharge: HOME | End: 2022-11-09
Payer: COMMERCIAL

## 2022-11-09 PROCEDURE — 97130 THER IVNTJ EA ADDL 15 MIN: CPT

## 2022-11-09 PROCEDURE — 97129 THER IVNTJ 1ST 15 MIN: CPT

## 2022-11-09 NOTE — PROGRESS NOTES
was seen for cognitive therapy. Student SLP provided treatment under the supervising SLP. Verbal rehearsal was targeted to provide Ms. Sesay with a strategy to aid in her memory recall during her activities of daily life. She verbally rehearsed an image/picture that was placed in front of her and then was asked questions regarding the image after it was removed.  correctly answered the questions with 93% accuracy in 14:15 opportunities while receiving minimal support. She was encouraged to verbally rehearse her actions throughout her day to aid in her recall of rote tasks.     Ladi Marmolejo  Student Speech-Language Pathologist    CPT CODE:       10207  therapeutic interventions that focus on cognitive function , initial  15 min  22996  therapeutic interventions that focus on cognitive function, each additional 15 min

## 2022-11-16 ENCOUNTER — HOSPITAL ENCOUNTER (OUTPATIENT)
Dept: SPEECH THERAPY | Age: 57
Setting detail: THERAPIES SERIES
Discharge: HOME | End: 2022-11-16
Payer: COMMERCIAL

## 2022-11-16 PROCEDURE — 97129 THER IVNTJ 1ST 15 MIN: CPT

## 2022-11-16 PROCEDURE — 97130 THER IVNTJ EA ADDL 15 MIN: CPT

## 2022-11-16 NOTE — PROGRESS NOTES
was seen for cognitive therapy. Student SLP provided treatment under the supervising SLP. Selective attention ( the ability to complete one task with no external stimulus present) was targeted.  completed cancellation tasks with 100% accuracy. Selective attention (the ability to complete a task with the presence of an external stimulus) was then targeted. Ms. Emerald Del Toro completed trial making exercises with 85% accuracy and graphomotor patterns with 75% accuracy while a news program was played loudly in the room. She was provided with strategies to increase her attention to task such as repeating a mantra to herself.        Luke Fajardo  Student Speech-Language Pathologist    CPT CODE:       54039  therapeutic interventions that focus on cognitive function , initial  15 min  41634  therapeutic interventions that focus on cognitive function, each additional 15 min

## 2022-11-23 ENCOUNTER — HOSPITAL ENCOUNTER (OUTPATIENT)
Dept: SPEECH THERAPY | Age: 57
Setting detail: THERAPIES SERIES
Discharge: HOME | End: 2022-11-23
Payer: COMMERCIAL

## 2022-11-23 PROCEDURE — 97129 THER IVNTJ 1ST 15 MIN: CPT

## 2022-11-23 PROCEDURE — 97130 THER IVNTJ EA ADDL 15 MIN: CPT

## 2022-11-23 NOTE — PROGRESS NOTES
was seen for cognitive therapy. Student SLP provided treatment under the supervising SLP. Selective attention (the ability to complete a task with the presence of an external stimulus) was then targeted. Ms. Pedro Shields completed document sorting exercises with 85% accuracy and a competing stimulus played loudly in the room. She was provided with strategies to increase her attention to task such as repeating a mantra to herself. She also demonstrated the ability to complete alternating attention tasks by switching between conversation and document sorting with 90% accuracy.       Jaylon Raya  Student Speech-Language Pathologist    CPT CODE:       11965  therapeutic interventions that focus on cognitive function , initial  15 min  46595  therapeutic interventions that focus on cognitive function, each additional 15 min

## 2022-11-30 ENCOUNTER — HOSPITAL ENCOUNTER (OUTPATIENT)
Dept: SPEECH THERAPY | Age: 57
Setting detail: THERAPIES SERIES
Discharge: HOME | End: 2022-11-30
Payer: COMMERCIAL

## 2022-11-30 PROCEDURE — 97130 THER IVNTJ EA ADDL 15 MIN: CPT

## 2022-11-30 PROCEDURE — 97129 THER IVNTJ 1ST 15 MIN: CPT

## 2022-11-30 NOTE — PROGRESS NOTES
was seen for cognitive therapy. Student SLP provided treatment under the supervising SLP. Divided attention (the ability to complete multiple tasks accurately) was targeted. Ms. Hassan Rater completed document sorting exercises with 95% accuracy while holding a conversation. She successfully sorted documents into categories based on correctness or errors made. After she completed the task she was asked to recall information from the conversation which she did with 100% accuracy. She also completed temporal orientation exercises. She demonstrated 93% accuracy during this exercise.        Jordana Chino  Student Speech-Language Pathologist    CPT CODE:       63645  therapeutic interventions that focus on cognitive function , initial  15 min  52985  therapeutic interventions that focus on cognitive function, each additional 15 min

## 2022-12-02 ENCOUNTER — OFFICE VISIT (OUTPATIENT)
Dept: FAMILY MEDICINE CLINIC | Age: 57
End: 2022-12-02
Payer: COMMERCIAL

## 2022-12-02 VITALS
TEMPERATURE: 98.8 F | SYSTOLIC BLOOD PRESSURE: 142 MMHG | HEART RATE: 96 BPM | BODY MASS INDEX: 39.86 KG/M2 | OXYGEN SATURATION: 99 % | DIASTOLIC BLOOD PRESSURE: 86 MMHG | WEIGHT: 225 LBS

## 2022-12-02 DIAGNOSIS — J06.9 ACUTE UPPER RESPIRATORY INFECTION, UNSPECIFIED: Primary | ICD-10-CM

## 2022-12-02 DIAGNOSIS — J01.90 ACUTE NON-RECURRENT SINUSITIS, UNSPECIFIED LOCATION: ICD-10-CM

## 2022-12-02 DIAGNOSIS — R05.9 COUGH, UNSPECIFIED TYPE: ICD-10-CM

## 2022-12-02 LAB
INFLUENZA A ANTIBODY: NEGATIVE
INFLUENZA B ANTIBODY: NEGATIVE
Lab: NORMAL
PERFORMING INSTRUMENT: NORMAL
QC PASS/FAIL: NORMAL
SARS-COV-2, POC: NORMAL

## 2022-12-02 PROCEDURE — 87804 INFLUENZA ASSAY W/OPTIC: CPT | Performed by: PHYSICIAN ASSISTANT

## 2022-12-02 PROCEDURE — 87426 SARSCOV CORONAVIRUS AG IA: CPT | Performed by: PHYSICIAN ASSISTANT

## 2022-12-02 PROCEDURE — 99213 OFFICE O/P EST LOW 20 MIN: CPT | Performed by: PHYSICIAN ASSISTANT

## 2022-12-02 PROCEDURE — 3078F DIAST BP <80 MM HG: CPT | Performed by: PHYSICIAN ASSISTANT

## 2022-12-02 PROCEDURE — 3074F SYST BP LT 130 MM HG: CPT | Performed by: PHYSICIAN ASSISTANT

## 2022-12-02 RX ORDER — BENZONATATE 100 MG/1
100 CAPSULE ORAL 3 TIMES DAILY PRN
Qty: 21 CAPSULE | Refills: 0 | Status: SHIPPED | OUTPATIENT
Start: 2022-12-02 | End: 2022-12-09

## 2022-12-02 RX ORDER — DOXYCYCLINE HYCLATE 100 MG
100 TABLET ORAL 2 TIMES DAILY
Qty: 20 TABLET | Refills: 0 | Status: SHIPPED | OUTPATIENT
Start: 2022-12-02 | End: 2022-12-12

## 2022-12-02 NOTE — PROGRESS NOTES
22  Jenny Valdes : 1965 Sex: female  Age 61 y.o. Subjective:  Chief Complaint   Patient presents with    Cough     Started yesterday    Headache         HPI:   Jenny Valdes , 61 y.o. female presents to express care for evaluation of cough, headache     HPI  58-year-old female presents to express care for evaluation of cough, congestion, headache. The patient has had the symptoms ongoing for the last couple of days. The patient states that today she feels that she just hit a wall. The patient has had some increased congestion, drainage. No fever, chills. No chest pain, shortness of breath. Patient not currently on any antibiotics. ROS:   Unless otherwise stated in this report the patient's positive and negative responses for review of systems for constitutional, eyes, ENT, cardiovascular, respiratory, gastrointestinal, neurological, , musculoskeletal, and integument systems and related systems to the presenting problem are either stated in the history of present illness or were not pertinent or were negative for the symptoms and/or complaints related to the presenting medical problem. Positives and pertinent negatives as per HPI. All others reviewed and are negative.       PMH:     Past Medical History:   Diagnosis Date    Depression     Diabetes mellitus ([address]     Gastritis     Hyperlipidemia        Past Surgical History:   Procedure Laterality Date    TONSILLECTOMY      at age 9       Family History   Problem Relation Age of Onset    Diabetes Father     Heart Disease Father        Medications:     Current Outpatient Medications:     doxycycline hyclate (VIBRA-TABS) 100 MG tablet, Take 1 tablet by mouth 2 times daily for 10 days, Disp: 20 tablet, Rfl: 0    benzonatate (TESSALON) 100 MG capsule, Take 1 capsule by mouth 3 times daily as needed for Cough, Disp: 21 capsule, Rfl: 0    desvenlafaxine succinate (PRISTIQ) 100 MG TB24 extended release tablet, Take 1 tablet by mouth daily, Disp: 90 tablet, Rfl: 1    busPIRone (BUSPAR) 5 MG tablet, TAKE 1 TABLET TWICE A DAY, Disp: 180 tablet, Rfl: 3    NONFORMULARY, Neurotrophin PMG, Disp: , Rfl:     Omega-3 Fatty Acids (OMEGA-3 FISH OIL PO), Take by mouth, Disp: , Rfl:     TURMERIC PO, Take by mouth, Disp: , Rfl:     NONFORMULARY, Cataplex G, Disp: , Rfl:     NONFORMULARY, Asura Brain, Disp: , Rfl:     Lancets MISC, 1 each by Does not apply route daily, Disp: 300 each, Rfl: 1    blood glucose monitor strips, Test one times a day Freestyle Freedom Lyte, Disp: 400 strip, Rfl: 1    acetaminophen (TYLENOL) 325 MG tablet, Take 650 mg by mouth every 6 hours as needed for Pain, Disp: , Rfl:     Melatonin 10 MG TABS, Take by mouth, Disp: , Rfl:     pantoprazole (PROTONIX) 40 MG tablet, Take 1 tablet by mouth daily, Disp: 90 tablet, Rfl: 3    rosuvastatin (CRESTOR) 5 MG tablet, Take 1 tablet by mouth daily, Disp: 90 tablet, Rfl: 3    amitriptyline (ELAVIL) 25 MG tablet, Take 1 tablet by mouth nightly, Disp: 90 tablet, Rfl: 3    metFORMIN (GLUCOPHAGE-XR) 500 MG extended release tablet, 2 morning  2 dinner, Disp: 360 tablet, Rfl: 3    Allergies:   No Known Allergies    Social History:     Social History     Tobacco Use    Smoking status: Never    Smokeless tobacco: Never   Vaping Use    Vaping Use: Never used   Substance Use Topics    Alcohol use: Not Currently    Drug use: Not Currently       Patient lives at home. Physical Exam:     Vitals:    12/02/22 1356   BP: (!) 142/86   Site: Right Upper Arm   Position: Sitting   Pulse: 96   Temp: 98.8 °F (37.1 °C)   TempSrc: Temporal   SpO2: 99%   Weight: 225 lb (102.1 kg)       Exam:  Physical Exam  Nurse's notes and vital signs reviewed. The patient is not hypoxic. ? General: Alert, no acute distress, patient resting comfortably Patient is not toxic or lethargic. Skin: Warm, intact, no pallor noted. There is no evidence of rash at this time.   Head: Normocephalic, atraumatic  Eye: Normal conjunctiva  Ears, Nose, Throat: Right tympanic membrane clear, left tympanic membrane clear. No drainage or discharge noted. No pre- or post-auricular tenderness, erythema, or swelling noted. Nasal congestion, rhinorrhea  Posterior oropharynx shows no erythema, tonsillar hypertrophy, or exudate. the uvula is midline. No trismus or drooling is noted. Moist mucous membranes. Neck: No anterior/posterior lymphadenopathy noted. No erythema, no masses, no fluctuance or induration noted. No meningeal signs. Cardiovascular: Regular Rate and Rhythm  Respiratory: No acute distress, no rhonchi, wheezing or crackles noted. No stridor or retractions are noted. Neurological: A&O x4, normal speech  Psychiatric: Cooperative       Testing:     Results for orders placed or performed in visit on 12/02/22   POCT COVID-19, Antigen   Result Value Ref Range    SARS-COV-2, POC Not-Detected Not Detected    Lot Number [id number]     QC Pass/Fail pass     Performing Instrument BD Veritor    POCT Influenza A/B   Result Value Ref Range    Influenza A Ab negative     Influenza B Ab negative            Medical Decision Making:     Vital signs reviewed    Past medical history reviewed. Allergies reviewed. Medications reviewed. Patient on arrival does not appear to be in any apparent distress or discomfort. The patient has been seen and evaluated. The patient does not appear to be toxic or lethargic. The patient has a negative COVID and negative influenza. The patient will be treated with doxycycline, Tessalon Perles. The patient was educated on the proper dosage of motrin and tylenol and the appropriate intervals of each. The patient is to increase fluid intake over the next several days. The patient is to use OTC decongestant as needed. The patient is to return to express care or go directly to the emergency department should any of the signs or symptoms worsen.  The patient is to followup with primary care physician in 2-3 days for repeat evaluation. The patient has no other questions or concerns at this time the patient will be discharged home. Clinical Impression:   [facility] was seen today for cough and headache. Diagnoses and all orders for this visit:    Acute upper respiratory infection, unspecified    Cough, unspecified type  -     POCT COVID-19, Antigen  -     POCT Influenza A/B    Acute non-recurrent sinusitis, unspecified location    Other orders  -     doxycycline hyclate (VIBRA-TABS) 100 MG tablet; Take 1 tablet by mouth 2 times daily for 10 days  -     benzonatate (TESSALON) 100 MG capsule; Take 1 capsule by mouth 3 times daily as needed for Cough      The patient is to call for any concerns or return if any of the signs or symptoms worsen. The patient is to follow-up with PCP in the next 2-3 days for repeat evaluation repeat assessment or go directly to the emergency department.      SIGNATURE: Jessika Haines III, PA-C

## 2022-12-03 RX ORDER — PREDNISONE 10 MG/1
TABLET ORAL
Qty: 18 TABLET | Refills: 0 | Status: SHIPPED | OUTPATIENT
Start: 2022-12-03

## 2022-12-07 ENCOUNTER — HOSPITAL ENCOUNTER (OUTPATIENT)
Dept: SPEECH THERAPY | Age: 57
Setting detail: THERAPIES SERIES
Discharge: HOME | End: 2022-12-07
Payer: COMMERCIAL

## 2022-12-07 PROCEDURE — 97129 THER IVNTJ 1ST 15 MIN: CPT

## 2022-12-07 PROCEDURE — 97130 THER IVNTJ EA ADDL 15 MIN: CPT

## 2022-12-07 NOTE — PROGRESS NOTES
was seen for cognitive therapy. Student SLP provided treatment under the supervising SLP. Divided attention (the ability to complete multiple tasks accurately) was targeted. Ms. Bryce Vazquez read a passage for comprehension while simultaneously cancelling specific words. Cancellation demonstrated 71% accuracy. She successfully answered comprehension questions regarding the passage with 100% accuracy. Verbal rehearsal was targeted as a memory strategy. Ms. Bryce Vazquez verbally described a picture while a competing a stimulus was present. She was then asked to recall information about the image. She demonstrated 100% accuracy with this task.         Betsy Elizabeth  Student Speech-Language Pathologist    CPT CODE:       70524  therapeutic interventions that focus on cognitive function , initial  15 min  10753  therapeutic interventions that focus on cognitive function, each additional 15 min

## 2022-12-08 ENCOUNTER — OFFICE VISIT (OUTPATIENT)
Dept: PRIMARY CARE CLINIC | Age: 57
End: 2022-12-08
Payer: COMMERCIAL

## 2022-12-08 VITALS
DIASTOLIC BLOOD PRESSURE: 84 MMHG | TEMPERATURE: 97.6 F | OXYGEN SATURATION: 99 % | WEIGHT: 224 LBS | SYSTOLIC BLOOD PRESSURE: 122 MMHG | HEIGHT: 63 IN | BODY MASS INDEX: 39.69 KG/M2 | HEART RATE: 66 BPM

## 2022-12-08 DIAGNOSIS — E78.5 HYPERLIPIDEMIA, UNSPECIFIED HYPERLIPIDEMIA TYPE: ICD-10-CM

## 2022-12-08 DIAGNOSIS — E11.9 TYPE 2 DIABETES MELLITUS WITHOUT COMPLICATION, WITHOUT LONG-TERM CURRENT USE OF INSULIN (HCC): Primary | ICD-10-CM

## 2022-12-08 DIAGNOSIS — F41.9 ANXIETY: ICD-10-CM

## 2022-12-08 DIAGNOSIS — I10 ESSENTIAL HYPERTENSION: ICD-10-CM

## 2022-12-08 DIAGNOSIS — R05.1 ACUTE COUGH: ICD-10-CM

## 2022-12-08 PROCEDURE — 3074F SYST BP LT 130 MM HG: CPT | Performed by: FAMILY MEDICINE

## 2022-12-08 PROCEDURE — 3078F DIAST BP <80 MM HG: CPT | Performed by: FAMILY MEDICINE

## 2022-12-08 PROCEDURE — 99214 OFFICE O/P EST MOD 30 MIN: CPT | Performed by: FAMILY MEDICINE

## 2022-12-08 PROCEDURE — 3044F HG A1C LEVEL LT 7.0%: CPT | Performed by: FAMILY MEDICINE

## 2022-12-08 RX ORDER — BENZONATATE 100 MG/1
100 CAPSULE ORAL 3 TIMES DAILY PRN
Qty: 30 CAPSULE | Refills: 1 | Status: SHIPPED | OUTPATIENT
Start: 2022-12-08 | End: 2022-12-28

## 2022-12-08 RX ORDER — METFORMIN HYDROCHLORIDE 500 MG/1
TABLET, EXTENDED RELEASE ORAL
Qty: 360 TABLET | Refills: 3 | Status: SHIPPED | OUTPATIENT
Start: 2022-12-08

## 2022-12-08 RX ORDER — PANTOPRAZOLE SODIUM 40 MG/1
40 TABLET, DELAYED RELEASE ORAL DAILY
Qty: 90 TABLET | Refills: 3 | Status: SHIPPED | OUTPATIENT
Start: 2022-12-08

## 2022-12-08 RX ORDER — ROSUVASTATIN CALCIUM 5 MG/1
5 TABLET, COATED ORAL DAILY
Qty: 90 TABLET | Refills: 3 | Status: SHIPPED | OUTPATIENT
Start: 2022-12-08

## 2022-12-08 RX ORDER — AMITRIPTYLINE HYDROCHLORIDE 25 MG/1
25 TABLET, FILM COATED ORAL NIGHTLY
Qty: 90 TABLET | Refills: 3 | Status: SHIPPED | OUTPATIENT
Start: 2022-12-08

## 2022-12-08 ASSESSMENT — ENCOUNTER SYMPTOMS
GASTROINTESTINAL NEGATIVE: 1
COUGH: 1
EYES NEGATIVE: 1
SINUS PAIN: 1
ALLERGIC/IMMUNOLOGIC NEGATIVE: 1
SINUS PRESSURE: 1

## 2022-12-08 NOTE — PROGRESS NOTES
22     Magdi Almonte    : 1965 Sex: female   Age: 61 y.o. Chief Complaint   Patient presents with    Cough     Saw express care for cough/congestion, feeling better and on abx       Prior to Admission medications    Medication Sig Start Date End Date Taking?  Authorizing Provider   rosuvastatin (CRESTOR) 5 MG tablet Take 1 tablet by mouth daily 22  Yes Skylar Oates DO   pantoprazole (PROTONIX) 40 MG tablet Take 1 tablet by mouth daily 22  Yes Skylar Oates DO   amitriptyline (ELAVIL) 25 MG tablet Take 1 tablet by mouth nightly 22  Yes Skylar Oates DO   metFORMIN (GLUCOPHAGE-XR) 500 MG extended release tablet 2 morning  2 dinner 22  Yes Skylar Oates DO   benzonatate (TESSALON) 100 MG capsule Take 1 capsule by mouth 3 times daily as needed for Cough 22 Yes Yunior Oates DO   predniSONE (DELTASONE) 10 MG tablet 3 tabs once daily for 3 days, 2 tabs once daily for 3 days, 1 tab once daily for 3 days 12/3/22  Yes Katha Collet III, PA   doxycycline hyclate (VIBRA-TABS) 100 MG tablet Take 1 tablet by mouth 2 times daily for 10 days 22 Yes Katha Collet III, PA   desvenlafaxine succinate (PRISTIQ) 100 MG TB24 extended release tablet Take 1 tablet by mouth daily 22  Yes Skylar Oates DO   busPIRone (BUSPAR) 5 MG tablet TAKE 1 TABLET TWICE A DAY 22  Yes Janette Patel,    NONFORMULARY Neurotrophin PMG   Yes Historical Provider, MD   Omega-3 Fatty Acids (OMEGA-3 FISH OIL PO) Take by mouth   Yes Historical Provider, MD   TURMERIC PO Take by mouth   Yes Historical Provider, MD   NONFORMULARY Cataplex G   Yes Historical Provider, MD   NONFORMULARY Asura Brain   Yes Historical Provider, MD   Lancets MISC 1 each by Does not apply route daily 5/10/22  Yes Skylar Oates DO   blood glucose monitor strips Test one times a day Freestyle [place] Lyte 5/10/22  Yes Skylar Hemp Traikoff, DO   acetaminophen (TYLENOL) 325 MG tablet Take 650 mg by mouth every 6 hours as needed for Pain   Yes Historical Provider, MD   Melatonin 10 MG TABS Take by mouth   Yes Historical Provider, MD          HPI: Evaluated today with diabetes hypertension hyperlipidemia anxiety and currently with some cough congestion. She went off prednisone because of sugar elevation and will maintain the doxycycline only. Did encourage Tessalon Perles on a as needed basis. Refill provided today. Medications otherwise as prescribed. Physical plan for January. Review of Systems   Constitutional: Negative. HENT:  Positive for congestion, sinus pressure and sinus pain. Eyes: Negative. Respiratory:  Positive for cough. Gastrointestinal: Negative. Endocrine: Negative. Genitourinary: Negative. Musculoskeletal: Negative. Skin: Negative. Allergic/Immunologic: Negative. Neurological: Negative. Hematological: Negative. Psychiatric/Behavioral: Negative.               Current Outpatient Medications:     rosuvastatin (CRESTOR) 5 MG tablet, Take 1 tablet by mouth daily, Disp: 90 tablet, Rfl: 3    pantoprazole (PROTONIX) 40 MG tablet, Take 1 tablet by mouth daily, Disp: 90 tablet, Rfl: 3    amitriptyline (ELAVIL) 25 MG tablet, Take 1 tablet by mouth nightly, Disp: 90 tablet, Rfl: 3    metFORMIN (GLUCOPHAGE-XR) 500 MG extended release tablet, 2 morning  2 dinner, Disp: 360 tablet, Rfl: 3    benzonatate (TESSALON) 100 MG capsule, Take 1 capsule by mouth 3 times daily as needed for Cough, Disp: 30 capsule, Rfl: 1    predniSONE (DELTASONE) 10 MG tablet, 3 tabs once daily for 3 days, 2 tabs once daily for 3 days, 1 tab once daily for 3 days, Disp: 18 tablet, Rfl: 0    doxycycline hyclate (VIBRA-TABS) 100 MG tablet, Take 1 tablet by mouth 2 times daily for 10 days, Disp: 20 tablet, Rfl: 0    desvenlafaxine succinate (PRISTIQ) 100 MG TB24 extended release tablet, Take 1 tablet by mouth daily, Disp: 90 tablet, Rfl: 1    busPIRone (BUSPAR) 5 MG tablet, TAKE 1 TABLET TWICE A DAY, Disp: 180 tablet, Rfl: 3    NONFORMULARY, Neurotrophin PMG, Disp: , Rfl:     Omega-3 Fatty Acids (OMEGA-3 FISH OIL PO), Take by mouth, Disp: , Rfl:     TURMERIC PO, Take by mouth, Disp: , Rfl:     NONFORMULARY, Cataplex G, Disp: , Rfl:     NONFORMULARY, Asura Brain, Disp: , Rfl:     Lancets MISC, 1 each by Does not apply route daily, Disp: 300 each, Rfl: 1    blood glucose monitor strips, Test one times a day Freestyle Freedom Lyte, Disp: 400 strip, Rfl: 1    acetaminophen (TYLENOL) 325 MG tablet, Take 650 mg by mouth every 6 hours as needed for Pain, Disp: , Rfl:     Melatonin 10 MG TABS, Take by mouth, Disp: , Rfl:     No Known Allergies    Social History     Tobacco Use    Smoking status: Never    Smokeless tobacco: Never   Vaping Use    Vaping Use: Never used   Substance Use Topics    Alcohol use: Not Currently    Drug use: Not Currently      Past Surgical History:   Procedure Laterality Date    TONSILLECTOMY      at age 9     Family History   Problem Relation Age of Onset    Diabetes Father     Heart Disease Father      Past Medical History:   Diagnosis Date    Depression     Diabetes mellitus ([facility] [address]     Gastritis     Hyperlipidemia        Vitals:    12/08/22 0838   BP: 122/84   Pulse: 66   Temp: 97.6 °F (36.4 °C)   SpO2: 99%   Weight: 224 lb (101.6 kg)   Height: 5' 3\" (1.6 m)     BP Readings from Last 3 Encounters:   12/08/22 122/84   12/02/22 (!) 142/86   10/27/22 124/82        Physical Exam  Vitals and nursing note reviewed. Constitutional:       Appearance: She is well-developed. HENT:      Head: Normocephalic. Right Ear: External ear normal.      Left Ear: External ear normal.      Nose: Nose normal.   Eyes:      Conjunctiva/sclera: Conjunctivae normal.      Pupils: Pupils are equal, round, and reactive to light. Cardiovascular:      Rate and Rhythm: Normal rate. Pulmonary:      Breath sounds: Normal breath sounds.    Abdominal:      General: Bowel sounds are normal.      Palpations: Abdomen is soft. Musculoskeletal:         General: Normal range of motion. Cervical back: Normal range of motion and neck supple. Skin:     General: Skin is warm and dry. Neurological:      Mental Status: She is alert and oriented to person, place, and time. Psychiatric:         Behavior: Behavior normal.      Exam findings consistent with URI cough bronchitis. Treatment as noted follows up with me in January. Postconcussion syndrome remained stable. She is back to fairly regular activities and doing well. Diabetes  Hypertension hyperlipidemia currently stable. Meds as prescribed. Plan Per Assessment:  Izzy Del Valle was seen today for cough. Diagnoses and all orders for this visit:    Type 2 diabetes mellitus without complication, without long-term current use of insulin (HCC)  -     Microalbumin / creatinine urine ratio; Future  -     CBC with Auto Differential; Future  -     Comprehensive Metabolic Panel; Future  -     Lipid Panel; Future  -     Hemoglobin A1C; Future  -     T4; Future  -     TSH; Future    Essential hypertension  -     CBC with Auto Differential; Future  -     Comprehensive Metabolic Panel; Future  -     Lipid Panel; Future  -     Hemoglobin A1C; Future  -     T4; Future  -     TSH; Future    Hyperlipidemia, unspecified hyperlipidemia type  -     CBC with Auto Differential; Future  -     Comprehensive Metabolic Panel; Future  -     Lipid Panel; Future  -     Hemoglobin A1C; Future  -     T4; Future  -     TSH; Future    Anxiety    Acute cough    Other orders  -     rosuvastatin (CRESTOR) 5 MG tablet; Take 1 tablet by mouth daily  -     pantoprazole (PROTONIX) 40 MG tablet; Take 1 tablet by mouth daily  -     amitriptyline (ELAVIL) 25 MG tablet; Take 1 tablet by mouth nightly  -     metFORMIN (GLUCOPHAGE-XR) 500 MG extended release tablet; 2 morning  2 dinner  -     benzonatate (TESSALON) 100 MG capsule;  Take 1 capsule by mouth 3 times daily as needed for Cough          Return in about 6 weeks (around 1/19/2023) for physEli Horton, DO    Note was generated with the assistance of voice recognition software. Document was reviewed however may contain grammatical errors.

## 2022-12-14 ENCOUNTER — HOSPITAL ENCOUNTER (OUTPATIENT)
Dept: SPEECH THERAPY | Age: 57
Setting detail: THERAPIES SERIES
Discharge: HOME | End: 2022-12-14
Payer: COMMERCIAL

## 2022-12-14 NOTE — PROGRESS NOTES
Jason Miranda did not show for her scheduled speech appointment. No notification was received. Therapy is expected to resume on the patient's next scheduled visit.

## 2022-12-21 ENCOUNTER — HOSPITAL ENCOUNTER (OUTPATIENT)
Dept: SPEECH THERAPY | Age: 57
Setting detail: THERAPIES SERIES
Discharge: HOME | End: 2022-12-21
Payer: COMMERCIAL

## 2022-12-21 PROCEDURE — 97129 THER IVNTJ 1ST 15 MIN: CPT

## 2022-12-21 PROCEDURE — 92507 TX SP LANG VOICE COMM INDIV: CPT

## 2022-12-21 PROCEDURE — 97130 THER IVNTJ EA ADDL 15 MIN: CPT

## 2022-12-21 NOTE — PROGRESS NOTES
was seen for cognitive therapy. Therapy targeted the following:    Patient was read a list of 4 words. Immediate recall demonstrated 100% accuracy. She then used association to pair the words. She demonstrated 100% accuracy. Delayed recall of the word pairs when given one of the words demonstrated 95% recall. Patient was presented with 6 unrelated objects. Patient was asked to group or pair the objects using association. Patient verbalized how the items were associated. The objects were then removed and the patient recalled the objects with 100% accuracy. After a delay, the SLP provided the associative cue. Patient demonstrated recall of the associated items with 100% accuracy. VY TuttleS., CCC-SLP/L  Speech-Language Pathologist    CPT CODE:       23872  therapeutic interventions that focus on cognitive function , initial  15 min  69887  therapeutic interventions that focus on cognitive function, each additional 15 min

## 2022-12-28 ENCOUNTER — APPOINTMENT (OUTPATIENT)
Dept: SPEECH THERAPY | Age: 57
End: 2022-12-28
Payer: COMMERCIAL

## 2023-01-03 NOTE — PROGRESS NOTES
[address] [place] [address]  Outpatient Speech Therapy  Phone: [phone] Fax: [phone]     SPEECH-LANGUAGE PATHOLOGY  UPDATED PLAN OF CARE    PATIENT NAME:  Paul Taylor  (female)     MRN:  [id number]    :  1965  (61 y.o.)  STATUS:  Outpatient clinic   TODAY'S DATE:  1/3/2023  REFERRING PROVIDER:  Dr. Raul Oates  NPI: [id number]       SPECIFIC PROVIDER ORDER: [facility]-Speech Therapy  Date of order:  22  EVALUATING THERAPIST: Gwenn Dubin, SLP    CERTIFICATION/RECERTIFICATION PERIOD: 1/3/2023 to 3/28/23  INSURANCE PROVIDER:  Payor: [address] [place] [address] / Plan: [address] [place] [address] / Product Type: *No Product type* /    INSURANCE ID:  [id number] - (Commercial)   SECONDARY INSURANCE (if applicable):        CPT Codes  EVALUATION: [id number]  standardized cognitive performance testing (per hour)    TREATMENT:  Requesting treatment authorization for additional 12 visits over 12 weeks focusing on the following CPT codes:      62724  therapeutic interventions that focus on cognitive function , initial  15 min  76628  therapeutic interventions that focus on cognitive function, each additional 15 min  89959  speech/language tx     Patient completed 9:12 recommended visits. An additional 12 visits is requested for this quarter for a total of 21 visits. REFERRING/TREATMENT DIAGNOSIS: Postconcussional syndrome [F07.81]          SPEECH THERAPY  PLAN OF CARE   The speech therapy  POC is established based on physician order, speech pathology diagnosis and results of clinical assessment     SPEECH PATHOLOGY DIAGNOSIS:     Admission:   Mild cognitive impairment    Current:  Mild-WFL cognitive impairment    Outpatient Speech Pathology intervention is recommended 1 time  per week for the above certification period.     Conditions Requiring Skilled Therapeutic Intervention for speech, language and/or cognition    Cognitive linguistic impairment  Decreased short term memory  Decreased problem solving skills     Specific Speech Therapy Interventions to Include: Therapeutic tasks for Cognition      SHORT/LONG TERM GOALS:  Progress in therapy has been recorded using the following key:  NC= no change; IMP= improved; ACH= achieved; NEI= not enough information      1. Patient will improve recent memory including temporal and spatial orientation to a modified independent level of assistance level with greater than 90% accuracy with the use of a memory log/calendar aid -IMP  2. Patient will complete the Assessment for Language Related Functional Activities -[address] [place] [address]  3. Patient will identify and spontaneously use compensatory techniques to assist with memory with greater than 90% accuracy-IMP  4. Patient will improve problem solving for functional activities such as financial, medication, and schedule management to a modified independent level of assistance level with greater than 90% accuracy and the use of compensatory aids/strategies. -[facility]    UPDATED PLAN OF CARE:  1. Patient will improve recent memory including temporal and spatial orientation to a modified independent level of assistance level with greater than 90% accuracy with the use of a memory log/calendar aid   2. Patient will identify and spontaneously use compensatory techniques to assist with memory with greater than 90% accuracy  3. Patient will improve problem solving for functional activities such as financial, medication, and schedule management to a modified independent level of assistance level with greater than 90% accuracy and the use of compensatory aids/strategies. Patient goals: Patient/family involved in developing goals and treatment plan:   Treatment goals discussed with Patient    The Patient understand(s) the diagnosis, prognosis and plan of care   The patient agreed with above     This plan may be re-evaluated and revised as warranted.         Rehabilitation Potential/Prognosis: good SIGNIFICANT INFORMATION:  Morgan Larson was referred by Dr. Galina Ozuna. Иван to [address]'s Outpatient Speech Pathology Department for cognitive evaluation and treatment due to a diagnosis of post concussion syndrome (F07.81). Patient was unaccompanied to the session and provided her own medical history. Patient reported that in February of 2022, she was taking out the garbage and slipped on the ice. She fell and hit the back of her head. She was taken to the ER. Patient reported that she had a brain bleed. She reported that post injury, she gets overwhelmed with too much activity or noise, gets fatigued after driving longer distances, can not mow the grass within a reasonable time due to exhaustion (took her three days to mow her lot), gets headaches more frequently, and has difficulty processing. She also had vision changes and was seen in [facility] for vision therapy. Patient reported that she completed high school and 2 years of college at [employer]. She works at [employer]. 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 Patient lives with her mother. She has 4 adult children and 3 grandchildren. She manages her own finances, medications, and schedule. She uses compensations for cognitive deficits such as a calendar aid and headphones to cancel out noise in the environment. Patient is in counseling. She reported a history of depression and anxiety. She is also diabetic. COGNITIVE EVALUATION:  At the time of initial evaluation on 10/11/22, Ms. Sesay was evaluated using the [facility] (RIPA-2).   Results of the assessment will be indicated below:        Subtest Raw Score Percentile Standard Score Severity          Immediate Memory   28 95 15 Mild-WFL   Recent Memory   29 91 14 Mild-WFL   Temporal Orientation  (Recent Memory) 30 91 14 Mild-WFL   Temporal Orientation  (Remote Memory) 28 75 12 moderate Spatial Orientation   30 91 14 Mild-WFL   Orientation to Environment 29 63 11 moderate   Recall of General Information 30 95 15 Mild-WFL   Problem Solving & Abstract Reasoning 27 84 13 moderate   Organization   30 >99 18 Mild-WFL   Auditory Processing & Retention 30 91 14 Mild-WFL       VARIANT OBSERVATIONS     Present Absent   Error (e) x    Perseveration (p)  x   Repeat Instructions/Stimulus (r) x    Denial/Refusal (d)  x   Delayed Response (dl) x    Confabulation (c)  x   Partially Correct (pc) x    Irrelevant (i)  x   Tangential (t)  x   Self-corrected (sc) x      Formalized assessment of cognition was continued/completed via the Assessment of Language-Related Functional Activities (RUBINA). This assessment measures the patient's ability to complete functional cognitive-language activities of daily living. An independent functioning rating of 1 indicates a high probability of independent functioning on that task. A score of 2 indicates the need for some level of assistance on that task. A score of 3 indicates a high probability that the patient is not able to function independently on that task assessed. Results of the assessment will be indicated below. SUBTEST PERCENT CORRECT INDEPENDENT FUNCTIONING RATING   1. Telling Time 80 1   2. Counting Money 100 1   3. Addressing an Envelope 100 1   4. Daily Math Problems 100 1   5. Writing a Check       Balancing a Checkbook 80 1   6. Understanding Medicine Labels 100 1   7. Understanding a Calendar 90 1   8. Reading Instructions 90 1   9. Using a Telephone 100 1   10. Writing Phone Messages 95 1      Ms. Sesay's RUBINA scores indicate that she demonstrates the ability to independently function on daily tasks. However, she reports difficulty during her work day and completing multiple tasks at once.  Ms. Clarissa Samaniego reported that she experienced increased difficulty with her memory and other cognitive functioning during her work day when she encounters multiple stimulus at one time. She stated that she can become overwhelmed by multiple tasks. SUMMARY:  Ghassan Huerta has demonstrated improvement in response to speech therapy intervention. Continued therapy is recommended in order to address the above mentioned needs. Prognosis for continued  improvement is good. Shila Steve M.S., CCC-SLP/L  Speech-Language Pathologist      [facility].     Phone: [phone]     If you have any questions or concerns, please don't hesitate to call. Thank you for your referral.    Physician/Provider Signature:________________________________Date:__________________  By signing above, the therapists plan is approved by the physician/provider. All patients under [employer] must be signed by physician/provider.

## 2023-01-04 ENCOUNTER — HOSPITAL ENCOUNTER (OUTPATIENT)
Dept: SPEECH THERAPY | Age: 58
Setting detail: THERAPIES SERIES
Discharge: HOME | End: 2023-01-04
Payer: COMMERCIAL

## 2023-01-04 PROCEDURE — 97129 THER IVNTJ 1ST 15 MIN: CPT

## 2023-01-04 PROCEDURE — 97130 THER IVNTJ EA ADDL 15 MIN: CPT

## 2023-01-04 NOTE — PROGRESS NOTES
was seen for cognitive therapy. Therapy targeted the following:    Patient was provided 7 lists with 6 words in each list.  Patient was asked to group the items in each list in to 2 categories and then label each category. She was instructed to be as specific as possible with the categories to assist in recall. Minimal assistance was provided. She was then given 2 minutes to study all 14 categories that she had created. After 2 minutes, the patient was presented with each category. She listed the items within the categories with 41:42 or 98% accuracy. Continue plan of care. Treatment plan and goals can be found in the initial evaluation/progress report. Shila Bermeo M.S., CCC-SLP/L  Speech-Language Pathologist    CPT CODE:       19646  therapeutic interventions that focus on cognitive function , initial  15 min  07656  therapeutic interventions that focus on cognitive function, each additional 15 min

## 2023-01-11 ENCOUNTER — HOSPITAL ENCOUNTER (OUTPATIENT)
Dept: SPEECH THERAPY | Age: 58
Setting detail: THERAPIES SERIES
Discharge: HOME | End: 2023-01-11
Payer: COMMERCIAL

## 2023-01-11 NOTE — PROGRESS NOTES
Katerin Sheikh did not show for her scheduled speech appointment. No notification was received. Therapy is expected to resume on the patient's next scheduled visit.

## 2023-01-18 ENCOUNTER — HOSPITAL ENCOUNTER (OUTPATIENT)
Dept: SPEECH THERAPY | Age: 58
Setting detail: THERAPIES SERIES
Discharge: HOME | End: 2023-01-18
Payer: COMMERCIAL

## 2023-01-18 NOTE — PROGRESS NOTES
Surinder Kimberlyn did not show for her scheduled speech appointment. No notification was received. Therapy is expected to resume on the patient's next scheduled visit.

## 2023-01-25 ENCOUNTER — HOSPITAL ENCOUNTER (OUTPATIENT)
Dept: SPEECH THERAPY | Age: 58
Setting detail: THERAPIES SERIES
Discharge: HOME | End: 2023-01-25
Payer: COMMERCIAL

## 2023-01-25 NOTE — PROGRESS NOTES
Salo Rodgers did not show for her scheduled speech appointment. No notification was received. Therapy is expected to resume on the patient's next scheduled visit.

## 2023-02-01 ENCOUNTER — HOSPITAL ENCOUNTER (OUTPATIENT)
Dept: SPEECH THERAPY | Age: 58
Setting detail: THERAPIES SERIES
Discharge: HOME | End: 2023-02-01

## 2023-02-01 NOTE — PROGRESS NOTES
Dc Saldana did not show for her scheduled speech appointment. No notification was received. Patient had received new insurance cards and submitted them to the registrar. She contacted this SLP regarding her insurance. SLP directed her to discuss her insurance with the registrar and/or .  SLP will contact the patient to determine if she plans on resuming her therapy or if she is requesting discharge.

## 2023-02-08 ENCOUNTER — HOSPITAL ENCOUNTER (OUTPATIENT)
Dept: SPEECH THERAPY | Age: 58
Setting detail: THERAPIES SERIES
Discharge: HOME | End: 2023-02-08

## 2023-02-08 NOTE — PROGRESS NOTES
[address] [place] [address]  Outpatient Speech Therapy  Phone: [phone] Fax: [phone]     SPEECH-LANGUAGE PATHOLOGY  DISCHARGE SUMMARY  PATIENT NAME:  Aurora Prince  (female)     MRN:  [id number]    :  1965  (61 y.o.)  STATUS:  Outpatient clinic   DATE OF DISCHARGE:  2023  REFERRING PROVIDER:  Dr. Thiago Oates  NPI: [id number]       SPECIFIC PROVIDER ORDER: [facility]-Speech Therapy  Date of order:  22  EVALUATING THERAPIST: PIYUSH Ayers    INSURANCE PROVIDER:  Payor: Emerson Blancas / Plan: [address] [place] [address] PPO / Product Type: *No Product type* /    INSURANCE ID:  [id number] - (Commercial)   SECONDARY INSURANCE (if applicable):        CPT Codes  EVALUATION: [id number]  standardized cognitive performance testing (per hour)    TREATMENT:  Requesting treatment authorization for additional 12 visits over 12 weeks focusing on the following CPT codes:      87193  therapeutic interventions that focus on cognitive function , initial  15 min  83725  therapeutic interventions that focus on cognitive function, each additional 15 min  39047  speech/language tx     Patient completed 10:21 recommended visits. REFERRING/TREATMENT DIAGNOSIS: Postconcussional syndrome [F07.81]          SPEECH THERAPY  PLAN OF CARE   The speech therapy  POC is established based on physician order, speech pathology diagnosis and results of clinical assessment     SPEECH PATHOLOGY DIAGNOSIS:     Admission:   Mild cognitive impairment    Discharge:  Mild-WFL cognitive impairment    Outpatient Speech Pathology intervention was recommended 1 time  per week for the above certification period. Conditions Requiring Skilled Therapeutic Intervention for speech, language and/or cognition  Cognitive linguistic impairment  Decreased short term memory  Decreased problem solving skills     Specific Speech Therapy Interventions to Include:    Therapeutic tasks for Cognition      SHORT/LONG TERM GOALS:  Progress in therapy has been recorded using the following key:  NC= no change; IMP= improved; ACH= achieved; NEI= not enough information      1. Patient will improve recent memory including temporal and spatial orientation to a modified independent level of assistance level with greater than 90% accuracy with the use of a memory log/calendar aid -IMP  2. Patient will complete the Assessment for Language Related Functional Activities -[address] [place] [address]  3. Patient will identify and spontaneously use compensatory techniques to assist with memory with greater than 90% accuracy-IMP  4. Patient will improve problem solving for functional activities such as financial, medication, and schedule management to a modified independent level of assistance level with greater than 90% accuracy and the use of compensatory aids/strategies. -IMP    Patient goals: Patient/family involved in developing goals and treatment plan:   Treatment goals discussed with Patient    The Patient understand(s) the diagnosis, prognosis and plan of care   The patient agreed with above     This plan may be re-evaluated and revised as warranted. Rehabilitation Potential/Prognosis: good                           SIGNIFICANT INFORMATION:  Radha Morgan was referred by Dr. Pedro Reid. Иван to [address] [place] [address]'s Outpatient Speech Pathology Department for cognitive evaluation and treatment due to a diagnosis of post concussion syndrome (F07.81). Patient was unaccompanied to the session and provided her own medical history. Patient reported that in February of 2022, she was taking out the garbage and slipped on the ice. She fell and hit the back of her head. She was taken to the ER. Patient reported that she had a brain bleed.   She reported that post injury, she gets overwhelmed with too much activity or noise, gets fatigued after driving longer distances, can not mow the grass within a reasonable time due to exhaustion (took her three days to mow her lot), gets headaches more frequently, and has difficulty processing. She also had vision changes and was seen in [facility] for vision therapy. Patient reported that she completed high school and 2 years of college at [employer]. She works at [employer]. 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 Patient lives with her mother. She has 4 adult children and 3 grandchildren. She manages her own finances, medications, and schedule. She uses compensations for cognitive deficits such as a calendar aid and headphones to cancel out noise in the environment. Patient is in counseling. She reported a history of depression and anxiety. She is also diabetic. COGNITIVE EVALUATION:  At the time of initial evaluation on 10/11/22, Ms. Sesay was evaluated using the [employer] (RIPA-2). Results of the assessment will be indicated below:        Subtest Raw Score Percentile Standard Score Severity          Immediate Memory   28 95 15 Mild-WFL   Recent Memory   29 91 14 Mild-WFL   Temporal Orientation  (Recent Memory) 30 91 14 Mild-WFL   Temporal Orientation  (Remote Memory) 28 75 12 moderate   Spatial Orientation   30 91 14 Mild-WFL   Orientation to Environment 29 63 11 moderate   Recall of General Information 30 95 15 Mild-WFL   Problem Solving & Abstract Reasoning 27 84 13 moderate   Organization   30 >99 18 Mild-WFL   Auditory Processing & Retention 30 91 14 Mild-WFL       VARIANT OBSERVATIONS     Present Absent   Error (e) x    Perseveration (p)  x   Repeat Instructions/Stimulus (r) x    Denial/Refusal (d)  x   Delayed Response (dl) x    Confabulation (c)  x   Partially Correct (pc) x    Irrelevant (i)  x   Tangential (t)  x   Self-corrected (sc) x      Formalized assessment of cognition was continued/completed via the Assessment of Language-Related Functional Activities (RUBINA).   This assessment measures the patient's ability to complete functional cognitive-language activities of daily living. An independent functioning rating of 1 indicates a high probability of independent functioning on that task. A score of 2 indicates the need for some level of assistance on that task. A score of 3 indicates a high probability that the patient is not able to function independently on that task assessed. Results of the assessment will be indicated below. SUBTEST PERCENT CORRECT INDEPENDENT FUNCTIONING RATING   1. Telling Time 80 1   2. Counting Money 100 1   3. Addressing an Envelope 100 1   4. Daily Math Problems 100 1   5. Writing a Check       Balancing a Checkbook 80 1   6. Understanding Medicine Labels 100 1   7. Understanding a Calendar 90 1   8. Reading Instructions 90 1   9. Using a Telephone 100 1   10. Writing Phone Messages 95 1      Ms. Sesay's RUBINA scores indicate that she demonstrates the ability to independently function on daily tasks. However, she reports difficulty during her work day and completing multiple tasks at once. Ms. Jeffery People reported that she experienced increased difficulty with her memory and other cognitive functioning during her work day when she encounters multiple stimulus at one time. She stated that she can become overwhelmed by multiple tasks. DISCHARGE SUMMARY:  Noemí Marte demonstrated improvement in response to speech therapy intervention. Continued therapy is recommended in order to address the above mentioned needs. Prognosis for continued improvement is good. However, patient requested discharge due to a change in medical insurance at the start of the new year. She reported that after talking with Registration, Benefits, and Billing, it was determined that until she received a bill, her financial responsibility was unclear. Therefore, the patient requested to be discharged at this time. A new prescription would be necessary to resume outpatient Speech Therapy. Shila RATLIFF Kiran Camarillo M.S., CCC-SLP/L  Speech-Language Pathologist      [facility].     Phone: [phone]     If you have any questions or concerns, please don't hesitate to call.   Thank you for your referral.

## 2023-02-16 ENCOUNTER — OFFICE VISIT (OUTPATIENT)
Dept: PRIMARY CARE CLINIC | Age: 58
End: 2023-02-16
Payer: COMMERCIAL

## 2023-02-16 VITALS
OXYGEN SATURATION: 98 % | WEIGHT: 226 LBS | TEMPERATURE: 97.3 F | HEIGHT: 63 IN | SYSTOLIC BLOOD PRESSURE: 118 MMHG | DIASTOLIC BLOOD PRESSURE: 80 MMHG | BODY MASS INDEX: 40.04 KG/M2 | HEART RATE: 83 BPM

## 2023-02-16 DIAGNOSIS — E78.5 HYPERLIPIDEMIA, UNSPECIFIED HYPERLIPIDEMIA TYPE: ICD-10-CM

## 2023-02-16 DIAGNOSIS — E11.9 TYPE 2 DIABETES MELLITUS WITHOUT COMPLICATION, WITHOUT LONG-TERM CURRENT USE OF INSULIN (HCC): Primary | ICD-10-CM

## 2023-02-16 DIAGNOSIS — F07.81 POST CONCUSSION SYNDROME: ICD-10-CM

## 2023-02-16 PROCEDURE — 3074F SYST BP LT 130 MM HG: CPT | Performed by: FAMILY MEDICINE

## 2023-02-16 PROCEDURE — 99214 OFFICE O/P EST MOD 30 MIN: CPT | Performed by: FAMILY MEDICINE

## 2023-02-16 PROCEDURE — 3079F DIAST BP 80-89 MM HG: CPT | Performed by: FAMILY MEDICINE

## 2023-02-16 ASSESSMENT — ENCOUNTER SYMPTOMS
EYES NEGATIVE: 1
ALLERGIC/IMMUNOLOGIC NEGATIVE: 1
GASTROINTESTINAL NEGATIVE: 1
RESPIRATORY NEGATIVE: 1

## 2023-02-16 NOTE — PROGRESS NOTES
23     Toi Lyman    : 1965 Sex: female   Age: 61 y.o. Chief Complaint   Patient presents with    Hypertension     Pt here for check up and paperwork         Prior to Admission medications    Medication Sig Start Date End Date Taking? Authorizing Provider   rosuvastatin (CRESTOR) 5 MG tablet Take 1 tablet by mouth daily 22  Yes Reynaldo Oates DO   pantoprazole (PROTONIX) 40 MG tablet Take 1 tablet by mouth daily 22  Yes Reynaldo Oates DO   amitriptyline (ELAVIL) 25 MG tablet Take 1 tablet by mouth nightly 22  Yes Reynaldo Oates, DO   metFORMIN (GLUCOPHAGE-XR) 500 MG extended release tablet 2 morning  2 dinner 22  Yes Reynaldo Oates DO   desvenlafaxine succinate (PRISTIQ) 100 MG TB24 extended release tablet Take 1 tablet by mouth daily 22  Yes Reynaldo Oates DO   busPIRone (BUSPAR) 5 MG tablet TAKE 1 TABLET TWICE A DAY 22  Yes Ira Maria,    NONFORMULARY Neurotrophin PMG   Yes Historical Provider, MD   Omega-3 Fatty Acids (OMEGA-3 FISH OIL PO) Take by mouth   Yes Historical Provider, MD   TURMERIC PO Take by mouth   Yes Historical Provider, MD   NONFORMULARY Cataplex G   Yes Historical Provider, MD   NONFORMULARY Asura Brain   Yes Historical Provider, MD   Lancets MISC 1 each by Does not apply route daily 5/10/22  Yes Reynaldo Oates DO   blood glucose monitor strips Test one times a day Freestyle [place] 5/10/22  Yes Reynaldo Oates DO   acetaminophen (TYLENOL) 325 MG tablet Take 650 mg by mouth every 6 hours as needed for Pain   Yes Historical Provider, MD   Melatonin 10 MG TABS Take by mouth   Yes Historical Provider, MD          HPI: He presents this morning with diabetes hyperlipidemia need for completion of FMLA forms related to her postconcussion syndrome. She has done remarkably well but still requiring maybe once or twice a month off related to headaches and visual issues from postconcussion.   Full neurology and neurosurgery eval has been stable. Physical therapy was helpful but she has stopped due to cost.          Review of Systems   Constitutional: Negative. HENT: Negative. Eyes: Negative. Respiratory: Negative. Gastrointestinal: Negative. Endocrine: Negative. Genitourinary: Negative. Musculoskeletal: Negative. Skin: Negative. Allergic/Immunologic: Negative. Neurological: Negative. Hematological: Negative. Psychiatric/Behavioral: Negative. Today systems review overall stable meds as prescribed.         Current Outpatient Medications:     rosuvastatin (CRESTOR) 5 MG tablet, Take 1 tablet by mouth daily, Disp: 90 tablet, Rfl: 3    pantoprazole (PROTONIX) 40 MG tablet, Take 1 tablet by mouth daily, Disp: 90 tablet, Rfl: 3    amitriptyline (ELAVIL) 25 MG tablet, Take 1 tablet by mouth nightly, Disp: 90 tablet, Rfl: 3    metFORMIN (GLUCOPHAGE-XR) 500 MG extended release tablet, 2 morning  2 dinner, Disp: 360 tablet, Rfl: 3    desvenlafaxine succinate (PRISTIQ) 100 MG TB24 extended release tablet, Take 1 tablet by mouth daily, Disp: 90 tablet, Rfl: 1    busPIRone (BUSPAR) 5 MG tablet, TAKE 1 TABLET TWICE A DAY, Disp: 180 tablet, Rfl: 3    NONFORMULARY, Neurotrophin PMG, Disp: , Rfl:     Omega-3 Fatty Acids (OMEGA-3 FISH OIL PO), Take by mouth, Disp: , Rfl:     TURMERIC PO, Take by mouth, Disp: , Rfl:     NONFORMULARY, Cataplex G, Disp: , Rfl:     NONFORMULARY, Asura Brain, Disp: , Rfl:     Lancets MISC, 1 each by Does not apply route daily, Disp: 300 each, Rfl: 1    blood glucose monitor strips, Test one times a day Freestyle [place], Disp: 400 strip, Rfl: 1    acetaminophen (TYLENOL) 325 MG tablet, Take 650 mg by mouth every 6 hours as needed for Pain, Disp: , Rfl:     Melatonin 10 MG TABS, Take by mouth, Disp: , Rfl:     No Known Allergies    Social History     Tobacco Use    Smoking status: Never    Smokeless tobacco: Never   Vaping Use    Vaping Use: Never used   Substance Use Topics    Alcohol use: Not Currently    Drug use: Not Currently      Past Surgical History:   Procedure Laterality Date    TONSILLECTOMY      at age 9     Family History   Problem Relation Age of Onset    Diabetes Father     Heart Disease Father      Past Medical History:   Diagnosis Date    Depression     Diabetes mellitus ([address]     Gastritis     Hyperlipidemia        Vitals:    02/16/23 0800   BP: 118/80   Pulse: 83   Temp: 97.3 °F (36.3 °C)   SpO2: 98%   Weight: 226 lb (102.5 kg)   Height: 5' 3\" (1.6 m)     BP Readings from Last 3 Encounters:   02/16/23 118/80   12/08/22 122/84   12/02/22 (!) 142/86        Physical Exam  Vitals and nursing note reviewed. Constitutional:       Appearance: She is well-developed. HENT:      Head: Normocephalic. Right Ear: External ear normal.      Left Ear: External ear normal.      Nose: Nose normal.   Eyes:      Conjunctiva/sclera: Conjunctivae normal.      Pupils: Pupils are equal, round, and reactive to light. Cardiovascular:      Rate and Rhythm: Normal rate. Pulmonary:      Breath sounds: Normal breath sounds. Abdominal:      General: Bowel sounds are normal.      Palpations: Abdomen is soft. Musculoskeletal:         General: Normal range of motion. Cervical back: Normal range of motion and neck supple. Skin:     General: Skin is warm and dry. Neurological:      Mental Status: She is alert and oriented to person, place, and time. Psychiatric:         Behavior: Behavior normal.      Today's vitals physical examination stable. Medications as prescribed. We will maintain current meds and care. Follow-up visit March for her physical sooner if problems. I did complete FMLA forms for her today. Plan Per Assessment:  Maryjane Schaefer was seen today for hypertension.     Diagnoses and all orders for this visit:    Type 2 diabetes mellitus without complication, without long-term current use of insulin ([address]    Post concussion syndrome    Hyperlipidemia, unspecified hyperlipidemia type          No follow-ups on file. Sandra Wright DO    Note was generated with the assistance of voice recognition software. Document was reviewed however may contain grammatical errors.

## 2023-02-24 DIAGNOSIS — I10 ESSENTIAL HYPERTENSION: ICD-10-CM

## 2023-02-24 DIAGNOSIS — E11.9 TYPE 2 DIABETES MELLITUS WITHOUT COMPLICATION, WITHOUT LONG-TERM CURRENT USE OF INSULIN (HCC): ICD-10-CM

## 2023-02-24 DIAGNOSIS — E78.5 HYPERLIPIDEMIA, UNSPECIFIED HYPERLIPIDEMIA TYPE: ICD-10-CM

## 2023-02-24 LAB
ALBUMIN SERPL-MCNC: 4.2 G/DL (ref 3.5–5.2)
ALP BLD-CCNC: 81 U/L (ref 35–104)
ALT SERPL-CCNC: 62 U/L (ref 0–32)
ANION GAP SERPL CALCULATED.3IONS-SCNC: 17 MMOL/L (ref 7–16)
AST SERPL-CCNC: 45 U/L (ref 0–31)
BASOPHILS ABSOLUTE: 0.06 E9/L (ref 0–0.2)
BASOPHILS RELATIVE PERCENT: 1 % (ref 0–2)
BILIRUB SERPL-MCNC: 0.5 MG/DL (ref 0–1.2)
BUN BLDV-MCNC: 14 MG/DL (ref 6–20)
CALCIUM SERPL-MCNC: 9.3 MG/DL (ref 8.6–10.2)
CHLORIDE BLD-SCNC: 104 MMOL/L (ref 98–107)
CHOLESTEROL, TOTAL: 160 MG/DL (ref 0–199)
CO2: 22 MMOL/L (ref 22–29)
CREAT SERPL-MCNC: 1.1 MG/DL (ref 0.5–1)
CREATININE URINE: 99 MG/DL (ref 29–226)
EOSINOPHILS ABSOLUTE: 0.42 E9/L (ref 0.05–0.5)
EOSINOPHILS RELATIVE PERCENT: 6.9 % (ref 0–6)
GFR SERPL CREATININE-BSD FRML MDRD: 58 ML/MIN/1.73
GLUCOSE BLD-MCNC: 146 MG/DL (ref 74–99)
HBA1C MFR BLD: 6.7 % (ref 4–5.6)
HCT VFR BLD CALC: 39.3 % (ref 34–48)
HDLC SERPL-MCNC: 38 MG/DL
HEMOGLOBIN: 13.1 G/DL (ref 11.5–15.5)
IMMATURE GRANULOCYTES #: 0.02 E9/L
IMMATURE GRANULOCYTES %: 0.3 % (ref 0–5)
LDL CHOLESTEROL CALCULATED: 94 MG/DL (ref 0–99)
LYMPHOCYTES ABSOLUTE: 1.29 E9/L (ref 1.5–4)
LYMPHOCYTES RELATIVE PERCENT: 21.1 % (ref 20–42)
MCH RBC QN AUTO: 31.3 PG (ref 26–35)
MCHC RBC AUTO-ENTMCNC: 33.3 % (ref 32–34.5)
MCV RBC AUTO: 93.8 FL (ref 80–99.9)
MICROALBUMIN UR-MCNC: 17 MG/L
MICROALBUMIN/CREAT UR-RTO: 17.2 (ref 0–30)
MONOCYTES ABSOLUTE: 0.57 E9/L (ref 0.1–0.95)
MONOCYTES RELATIVE PERCENT: 9.3 % (ref 2–12)
NEUTROPHILS ABSOLUTE: 3.74 E9/L (ref 1.8–7.3)
NEUTROPHILS RELATIVE PERCENT: 61.4 % (ref 43–80)
PDW BLD-RTO: 13.7 FL (ref 11.5–15)
PLATELET # BLD: 331 E9/L (ref 130–450)
PMV BLD AUTO: 9.5 FL (ref 7–12)
POTASSIUM SERPL-SCNC: 4.6 MMOL/L (ref 3.5–5)
RBC # BLD: 4.19 E12/L (ref 3.5–5.5)
SODIUM BLD-SCNC: 143 MMOL/L (ref 132–146)
T4 TOTAL: 6.2 MCG/DL (ref 4.5–11.7)
TOTAL PROTEIN: 7.3 G/DL (ref 6.4–8.3)
TRIGL SERPL-MCNC: 139 MG/DL (ref 0–149)
TSH SERPL DL<=0.05 MIU/L-ACNC: 1.41 UIU/ML (ref 0.27–4.2)
VLDLC SERPL CALC-MCNC: 28 MG/DL
WBC # BLD: 6.1 E9/L (ref 4.5–11.5)

## 2023-03-01 ENCOUNTER — OFFICE VISIT (OUTPATIENT)
Dept: PRIMARY CARE CLINIC | Age: 58
End: 2023-03-01

## 2023-03-01 VITALS
DIASTOLIC BLOOD PRESSURE: 84 MMHG | WEIGHT: 224 LBS | BODY MASS INDEX: 39.68 KG/M2 | SYSTOLIC BLOOD PRESSURE: 122 MMHG | OXYGEN SATURATION: 98 % | HEART RATE: 69 BPM

## 2023-03-01 DIAGNOSIS — I10 ESSENTIAL HYPERTENSION: ICD-10-CM

## 2023-03-01 DIAGNOSIS — Z12.11 COLON CANCER SCREENING: ICD-10-CM

## 2023-03-01 DIAGNOSIS — E78.5 HYPERLIPIDEMIA, UNSPECIFIED HYPERLIPIDEMIA TYPE: ICD-10-CM

## 2023-03-01 DIAGNOSIS — Z00.00 ANNUAL PHYSICAL EXAM: Primary | ICD-10-CM

## 2023-03-01 DIAGNOSIS — Z12.31 BREAST CANCER SCREENING BY MAMMOGRAM: ICD-10-CM

## 2023-03-01 DIAGNOSIS — E11.9 TYPE 2 DIABETES MELLITUS WITHOUT COMPLICATION, WITHOUT LONG-TERM CURRENT USE OF INSULIN (HCC): ICD-10-CM

## 2023-03-01 DIAGNOSIS — K21.00 GASTROESOPHAGEAL REFLUX DISEASE WITH ESOPHAGITIS WITHOUT HEMORRHAGE: ICD-10-CM

## 2023-03-01 RX ORDER — PENICILLIN V POTASSIUM 500 MG/1
TABLET ORAL
COMMUNITY
Start: 2023-02-24

## 2023-03-01 ASSESSMENT — PATIENT HEALTH QUESTIONNAIRE - PHQ9
SUM OF ALL RESPONSES TO PHQ QUESTIONS 1-9: 9
9. THOUGHTS THAT YOU WOULD BE BETTER OFF DEAD, OR OF HURTING YOURSELF: 0
SUM OF ALL RESPONSES TO PHQ QUESTIONS 1-9: 9
4. FEELING TIRED OR HAVING LITTLE ENERGY: 2
5. POOR APPETITE OR OVEREATING: 2
3. TROUBLE FALLING OR STAYING ASLEEP: 1
SUM OF ALL RESPONSES TO PHQ QUESTIONS 1-9: 9
SUM OF ALL RESPONSES TO PHQ QUESTIONS 1-9: 9
SUM OF ALL RESPONSES TO PHQ9 QUESTIONS 1 & 2: 2
2. FEELING DOWN, DEPRESSED OR HOPELESS: 1
8. MOVING OR SPEAKING SO SLOWLY THAT OTHER PEOPLE COULD HAVE NOTICED. OR THE OPPOSITE, BEING SO FIGETY OR RESTLESS THAT YOU HAVE BEEN MOVING AROUND A LOT MORE THAN USUAL: 0
7. TROUBLE CONCENTRATING ON THINGS, SUCH AS READING THE NEWSPAPER OR WATCHING TELEVISION: 1
1. LITTLE INTEREST OR PLEASURE IN DOING THINGS: 1
6. FEELING BAD ABOUT YOURSELF - OR THAT YOU ARE A FAILURE OR HAVE LET YOURSELF OR YOUR FAMILY DOWN: 1

## 2023-03-01 NOTE — PROGRESS NOTES
3/1/23     Yury Falk    : 1965 Sex: female   Age: 61 y.o. Chief Complaint   Patient presents with    Annual Exam    Discuss Labs    Diabetes    Other     Pristiq over 300 now on insurance wondering about alternative       Prior to Admission medications    Medication Sig Start Date End Date Taking?  Authorizing Provider   penicillin v potassium (VEETID) 500 MG tablet TAKE TWO TABLETS BY MOUTH NOW  THEN TAKE 1 TABLET EVERY 6 HOURS UNTIL GONE. 23  Yes Historical Provider, MD   rosuvastatin (CRESTOR) 5 MG tablet Take 1 tablet by mouth daily 22  Yes Rl Oates DO   pantoprazole (PROTONIX) 40 MG tablet Take 1 tablet by mouth daily 22  Yes Rl Oates DO   amitriptyline (ELAVIL) 25 MG tablet Take 1 tablet by mouth nightly 22  Yes Rl Oates DO   metFORMIN (GLUCOPHAGE-XR) 500 MG extended release tablet 2 morning  2 dinner 22  Yes Rl Oates DO   desvenlafaxine succinate (PRISTIQ) 100 MG TB24 extended release tablet Take 1 tablet by mouth daily 22  Yes Rl Oates DO   busPIRone (BUSPAR) 5 MG tablet TAKE 1 TABLET TWICE A DAY 22  Yes Tito Delarosa,    NONFORMULARY Neurotrophin PMG   Yes Historical Provider, MD   Omega-3 Fatty Acids (OMEGA-3 FISH OIL PO) Take by mouth   Yes Historical Provider, MD   TURMERIC PO Take by mouth   Yes Historical Provider, MD   NONFORMULARY Cataplex G   Yes Historical Provider, MD   NONFORMULARY Asura Brain   Yes Historical Provider, MD   Lancets MISC 1 each by Does not apply route daily 5/10/22  Yes Rl Oates DO   blood glucose monitor strips Test one times a day Freestyle [place] 5/10/22  Yes Rl Oates DO   acetaminophen (TYLENOL) 325 MG tablet Take 650 mg by mouth every 6 hours as needed for Pain   Yes Historical Provider, MD   Melatonin 10 MG TABS Take by mouth   Yes Historical Provider, MD          HPI: Evaluated today health maintenance physical hypertension diabetes reflux disease hyperlipidemia health maintenance in need of mammogram and prescription provided and also colon cancer screening and referral for endoscopy. Medically she has otherwise been well and medications well-tolerated. Review of Systems   Constitutional: Negative. HENT: Negative. Eyes: Negative. Respiratory: Negative. Gastrointestinal: Negative. Endocrine: Negative. Genitourinary: Negative. Musculoskeletal: Negative. Skin: Negative. Allergic/Immunologic: Negative. Neurological: Negative. Hematological: Negative. Psychiatric/Behavioral: Negative. Today systems review overall stable meds as prescribed.       Current Outpatient Medications:     penicillin v potassium (VEETID) 500 MG tablet, TAKE TWO TABLETS BY MOUTH NOW  THEN TAKE 1 TABLET EVERY 6 HOURS UNTIL GONE., Disp: , Rfl:     rosuvastatin (CRESTOR) 5 MG tablet, Take 1 tablet by mouth daily, Disp: 90 tablet, Rfl: 3    pantoprazole (PROTONIX) 40 MG tablet, Take 1 tablet by mouth daily, Disp: 90 tablet, Rfl: 3    amitriptyline (ELAVIL) 25 MG tablet, Take 1 tablet by mouth nightly, Disp: 90 tablet, Rfl: 3    metFORMIN (GLUCOPHAGE-XR) 500 MG extended release tablet, 2 morning  2 dinner, Disp: 360 tablet, Rfl: 3    desvenlafaxine succinate (PRISTIQ) 100 MG TB24 extended release tablet, Take 1 tablet by mouth daily, Disp: 90 tablet, Rfl: 1    busPIRone (BUSPAR) 5 MG tablet, TAKE 1 TABLET TWICE A DAY, Disp: 180 tablet, Rfl: 3    NONFORMULARY, Neurotrophin PMG, Disp: , Rfl:     Omega-3 Fatty Acids (OMEGA-3 FISH OIL PO), Take by mouth, Disp: , Rfl:     TURMERIC PO, Take by mouth, Disp: , Rfl:     NONFORMULARY, Cataplex G, Disp: , Rfl:     NONFORMULARY, Asura Brain, Disp: , Rfl:     Lancets MISC, 1 each by Does not apply route daily, Disp: 300 each, Rfl: 1    blood glucose monitor strips, Test one times a day Freestyle [place], Disp: 400 strip, Rfl: 1    acetaminophen (TYLENOL) 325 MG tablet, Take 650 mg by mouth every 6 hours as needed for Pain, Disp: , Rfl:     Melatonin 10 MG TABS, Take by mouth, Disp: , Rfl:     No Known Allergies    Social History     Tobacco Use    Smoking status: Never    Smokeless tobacco: Never   Vaping Use    Vaping Use: Never used   Substance Use Topics    Alcohol use: Not Currently    Drug use: Not Currently      Past Surgical History:   Procedure Laterality Date    TONSILLECTOMY      at age 9     Family History   Problem Relation Age of Onset    Diabetes Father     Heart Disease Father      Past Medical History:   Diagnosis Date    Depression     Diabetes mellitus ([facility] [address]     Gastritis     Hyperlipidemia        Vitals:    03/01/23 0814   BP: 122/84   Pulse: 69   SpO2: 98%   Weight: 224 lb (101.6 kg)     BP Readings from Last 3 Encounters:   03/01/23 122/84   02/16/23 118/80   12/08/22 122/84        Physical Exam  Vitals and nursing note reviewed. Constitutional:       Appearance: She is well-developed. HENT:      Head: Normocephalic. Right Ear: External ear normal.      Left Ear: External ear normal.      Nose: Nose normal.   Eyes:      Conjunctiva/sclera: Conjunctivae normal.      Pupils: Pupils are equal, round, and reactive to light. Cardiovascular:      Rate and Rhythm: Normal rate. Pulmonary:      Breath sounds: Normal breath sounds. Abdominal:      General: Bowel sounds are normal.      Palpations: Abdomen is soft. Musculoskeletal:         General: Normal range of motion. Cervical back: Normal range of motion and neck supple. Skin:     General: Skin is warm and dry. Neurological:      Mental Status: She is alert and oriented to person, place, and time. Psychiatric:         Behavior: Behavior normal.   Blood pressure controlled. Heart is regular lungs are clear. Medications as prescribed. Labs reviewed and stable. Reassessment next 3 months and sooner if problems. Blood sugars well controlled. Repeat labs when she returns.         Lab Results   Component Value Date    TSH 1.410 02/24/2023    TSH 1.450 10/25/2022    S0HNZSM 94.2 09/20/2013    E4DKWAB 6.2 02/24/2023    R8KGBLC 6.5 10/25/2022     Lab Results   Component Value Date    CHOL 160 02/24/2023    CHOL 151 10/25/2022     Lab Results   Component Value Date    TRIG 139 02/24/2023    TRIG 170 (H) 10/25/2022     Lab Results   Component Value Date    HDL 38 02/24/2023    HDL 37 10/25/2022     No results found for: LDLCHOLESTEROL  Lab Results   Component Value Date    LABVLDL 28 02/24/2023    LABVLDL 34 10/25/2022     Lab Results   Component Value Date    CHOLHDLRATIO 5 01/22/2021    CHOLHDLRATIO 5 06/08/2020     Lab Results   Component Value Date    WBC 6.1 02/24/2023    HGB 13.1 02/24/2023    HCT 39.3 02/24/2023    MCV 93.8 02/24/2023     02/24/2023    LYMPHOPCT 21.1 02/24/2023    RBC 4.19 02/24/2023    MCH 31.3 02/24/2023    MCHC 33.3 02/24/2023    RDW 13.7 02/24/2023     Lab Results   Component Value Date     02/24/2023    K 4.6 02/24/2023     02/24/2023    CO2 22 02/24/2023    BUN 14 02/24/2023    CREATININE 1.1 (H) 02/24/2023    GLUCOSE 146 (H) 02/24/2023    CALCIUM 9.3 02/24/2023    PROT 7.3 02/24/2023    LABALBU 4.2 02/24/2023    BILITOT 0.5 02/24/2023    ALKPHOS 81 02/24/2023    AST 45 (H) 02/24/2023    ALT 62 (H) 02/24/2023    LABGLOM 58 02/24/2023    GFRAA >60 04/12/2022      No results found for: PSA, PSADIA   Lab Results   Component Value Date    LABA1C 6.7 (H) 02/24/2023    LABA1C 5.9 (H) 10/25/2022    LABA1C 6.3 (H) 04/12/2022     No results found for: EAG      Plan Per Assessment:  Francine was seen today for annual exam, discuss labs, diabetes and other.    Diagnoses and all orders for this visit:    Annual physical exam  -     EKG 12 lead; Future  -     EKG 12 lead            No follow-ups on file.      Yunior Oates,     Note was generated with the assistance of voice recognition software.  Document was reviewed however may contain grammatical errors.

## 2023-03-31 PROBLEM — Z12.11 COLON CANCER SCREENING: Status: RESOLVED | Noted: 2019-10-29 | Resolved: 2023-03-31

## 2023-05-31 ENCOUNTER — OFFICE VISIT (OUTPATIENT)
Dept: PRIMARY CARE CLINIC | Age: 58
End: 2023-05-31
Payer: COMMERCIAL

## 2023-05-31 VITALS
TEMPERATURE: 97.3 F | HEIGHT: 63 IN | WEIGHT: 225 LBS | BODY MASS INDEX: 39.87 KG/M2 | SYSTOLIC BLOOD PRESSURE: 126 MMHG | HEART RATE: 73 BPM | OXYGEN SATURATION: 97 % | DIASTOLIC BLOOD PRESSURE: 86 MMHG

## 2023-05-31 DIAGNOSIS — E11.9 TYPE 2 DIABETES MELLITUS WITHOUT COMPLICATION, WITHOUT LONG-TERM CURRENT USE OF INSULIN (HCC): ICD-10-CM

## 2023-05-31 DIAGNOSIS — I10 ESSENTIAL HYPERTENSION: Primary | ICD-10-CM

## 2023-05-31 DIAGNOSIS — E78.5 HYPERLIPIDEMIA, UNSPECIFIED HYPERLIPIDEMIA TYPE: ICD-10-CM

## 2023-05-31 DIAGNOSIS — F32.A DEPRESSION, UNSPECIFIED DEPRESSION TYPE: ICD-10-CM

## 2023-05-31 DIAGNOSIS — F41.9 ANXIETY: ICD-10-CM

## 2023-05-31 DIAGNOSIS — I10 ESSENTIAL HYPERTENSION: ICD-10-CM

## 2023-05-31 LAB
ALBUMIN SERPL-MCNC: 4.5 G/DL (ref 3.5–5.2)
ALP SERPL-CCNC: 93 U/L (ref 35–104)
ALT SERPL-CCNC: 63 U/L (ref 0–32)
ANION GAP SERPL CALCULATED.3IONS-SCNC: 12 MMOL/L (ref 7–16)
AST SERPL-CCNC: 38 U/L (ref 0–31)
BILIRUB SERPL-MCNC: 0.5 MG/DL (ref 0–1.2)
BUN SERPL-MCNC: 14 MG/DL (ref 6–20)
CALCIUM SERPL-MCNC: 9.7 MG/DL (ref 8.6–10.2)
CHLORIDE SERPL-SCNC: 103 MMOL/L (ref 98–107)
CHOLESTEROL, TOTAL: 144 MG/DL (ref 0–199)
CO2 SERPL-SCNC: 24 MMOL/L (ref 22–29)
CREAT SERPL-MCNC: 1 MG/DL (ref 0.5–1)
GLUCOSE SERPL-MCNC: 147 MG/DL (ref 74–99)
HBA1C MFR BLD: 6.3 % (ref 4–5.6)
HDLC SERPL-MCNC: 41 MG/DL
LDLC SERPL CALC-MCNC: 71 MG/DL (ref 0–99)
POTASSIUM SERPL-SCNC: 4.7 MMOL/L (ref 3.5–5)
PROT SERPL-MCNC: 7.5 G/DL (ref 6.4–8.3)
SODIUM SERPL-SCNC: 139 MMOL/L (ref 132–146)
TRIGL SERPL-MCNC: 160 MG/DL (ref 0–149)
VLDLC SERPL CALC-MCNC: 32 MG/DL

## 2023-05-31 PROCEDURE — 3074F SYST BP LT 130 MM HG: CPT | Performed by: FAMILY MEDICINE

## 2023-05-31 PROCEDURE — 99214 OFFICE O/P EST MOD 30 MIN: CPT | Performed by: FAMILY MEDICINE

## 2023-05-31 PROCEDURE — 3079F DIAST BP 80-89 MM HG: CPT | Performed by: FAMILY MEDICINE

## 2023-05-31 PROCEDURE — 3044F HG A1C LEVEL LT 7.0%: CPT | Performed by: FAMILY MEDICINE

## 2023-05-31 RX ORDER — GLUCOSAMINE HCL/CHONDROITIN SU 500-400 MG
CAPSULE ORAL
Qty: 400 STRIP | Refills: 1 | Status: SHIPPED | OUTPATIENT
Start: 2023-05-31

## 2023-05-31 RX ORDER — VENLAFAXINE HYDROCHLORIDE 75 MG/1
75 CAPSULE, EXTENDED RELEASE ORAL DAILY
Qty: 30 CAPSULE | Refills: 3 | Status: SHIPPED | OUTPATIENT
Start: 2023-05-31

## 2023-05-31 RX ORDER — BUSPIRONE HYDROCHLORIDE 5 MG/1
5 TABLET ORAL 2 TIMES DAILY
Qty: 180 TABLET | Refills: 3 | Status: SHIPPED | OUTPATIENT
Start: 2023-05-31

## 2023-05-31 RX ORDER — LANCETS 30 GAUGE
1 EACH MISCELLANEOUS DAILY
Qty: 300 EACH | Refills: 3 | Status: SHIPPED | OUTPATIENT
Start: 2023-05-31

## 2023-05-31 RX ORDER — BUSPIRONE HYDROCHLORIDE 5 MG/1
5 TABLET ORAL 2 TIMES DAILY
Qty: 180 TABLET | Refills: 3 | Status: SHIPPED
Start: 2023-05-31 | End: 2023-05-31 | Stop reason: SDUPTHER

## 2023-05-31 SDOH — ECONOMIC STABILITY: HOUSING INSECURITY
IN THE LAST 12 MONTHS, WAS THERE A TIME WHEN YOU DID NOT HAVE A STEADY PLACE TO SLEEP OR SLEPT IN A SHELTER (INCLUDING NOW)?: NO

## 2023-05-31 SDOH — ECONOMIC STABILITY: FOOD INSECURITY: WITHIN THE PAST 12 MONTHS, THE FOOD YOU BOUGHT JUST DIDN'T LAST AND YOU DIDN'T HAVE MONEY TO GET MORE.: NEVER TRUE

## 2023-05-31 SDOH — ECONOMIC STABILITY: INCOME INSECURITY: HOW HARD IS IT FOR YOU TO PAY FOR THE VERY BASICS LIKE FOOD, HOUSING, MEDICAL CARE, AND HEATING?: NOT HARD AT ALL

## 2023-05-31 SDOH — ECONOMIC STABILITY: FOOD INSECURITY: WITHIN THE PAST 12 MONTHS, YOU WORRIED THAT YOUR FOOD WOULD RUN OUT BEFORE YOU GOT MONEY TO BUY MORE.: NEVER TRUE

## 2023-05-31 NOTE — PROGRESS NOTES
G, Disp: , Rfl:     NONFORMULARY, Asura Brain, Disp: , Rfl:     acetaminophen (TYLENOL) 325 MG tablet, Take 650 mg by mouth every 6 hours as needed for Pain, Disp: , Rfl:     Melatonin 10 MG TABS, Take by mouth, Disp: , Rfl:     No Known Allergies    Social History     Tobacco Use    Smoking status: Never    Smokeless tobacco: Never   Vaping Use    Vaping Use: Never used   Substance Use Topics    Alcohol use: Not Currently    Drug use: Not Currently      Past Surgical History:   Procedure Laterality Date    TONSILLECTOMY      at age 9     Family History   Problem Relation Age of Onset    Diabetes Father     Heart Disease Father      Past Medical History:   Diagnosis Date    Depression     Diabetes mellitus ([facility] [address]     Gastritis     Hyperlipidemia        Vitals:    05/31/23 0830   BP: 126/86   Pulse: 73   Temp: 97.3 °F (36.3 °C)   SpO2: 97%   Weight: 225 lb (102.1 kg)   Height: 5' 3\" (1.6 m)     BP Readings from Last 3 Encounters:   05/31/23 126/86   03/01/23 122/84   02/16/23 118/80        Physical Exam  Vitals and nursing note reviewed. Constitutional:       Appearance: She is well-developed. HENT:      Head: Normocephalic. Right Ear: External ear normal.      Left Ear: External ear normal.      Nose: Nose normal.   Eyes:      Conjunctiva/sclera: Conjunctivae normal.      Pupils: Pupils are equal, round, and reactive to light. Cardiovascular:      Rate and Rhythm: Normal rate. Pulmonary:      Breath sounds: Normal breath sounds. Abdominal:      General: Bowel sounds are normal.      Palpations: Abdomen is soft. Musculoskeletal:         General: Normal range of motion. Cervical back: Normal range of motion and neck supple. Skin:     General: Skin is warm and dry. Neurological:      Mental Status: She is alert and oriented to person, place, and time. Psychiatric:         Behavior: Behavior normal.   Afebrile vital stable. Heart is regular lungs are clear. Medications as prescribed.

## 2023-07-03 ENCOUNTER — OFFICE VISIT (OUTPATIENT)
Dept: PRIMARY CARE CLINIC | Age: 58
End: 2023-07-03
Payer: COMMERCIAL

## 2023-07-03 VITALS
WEIGHT: 221 LBS | BODY MASS INDEX: 39.15 KG/M2 | HEART RATE: 80 BPM | DIASTOLIC BLOOD PRESSURE: 82 MMHG | OXYGEN SATURATION: 97 % | SYSTOLIC BLOOD PRESSURE: 110 MMHG | TEMPERATURE: 98.2 F

## 2023-07-03 DIAGNOSIS — I10 ESSENTIAL HYPERTENSION: Primary | ICD-10-CM

## 2023-07-03 DIAGNOSIS — E78.5 HYPERLIPIDEMIA, UNSPECIFIED HYPERLIPIDEMIA TYPE: ICD-10-CM

## 2023-07-03 DIAGNOSIS — R73.01 IMPAIRED FASTING GLUCOSE: ICD-10-CM

## 2023-07-03 DIAGNOSIS — E11.9 TYPE 2 DIABETES MELLITUS WITHOUT COMPLICATION, WITHOUT LONG-TERM CURRENT USE OF INSULIN (HCC): ICD-10-CM

## 2023-07-03 PROCEDURE — 3044F HG A1C LEVEL LT 7.0%: CPT | Performed by: FAMILY MEDICINE

## 2023-07-03 PROCEDURE — 3079F DIAST BP 80-89 MM HG: CPT | Performed by: FAMILY MEDICINE

## 2023-07-03 PROCEDURE — 3074F SYST BP LT 130 MM HG: CPT | Performed by: FAMILY MEDICINE

## 2023-07-03 PROCEDURE — 99214 OFFICE O/P EST MOD 30 MIN: CPT | Performed by: FAMILY MEDICINE

## 2023-07-03 RX ORDER — AMITRIPTYLINE HYDROCHLORIDE 50 MG/1
TABLET, FILM COATED ORAL
Qty: 90 TABLET | Refills: 3 | Status: SHIPPED | OUTPATIENT
Start: 2023-07-03

## 2023-07-03 NOTE — PROGRESS NOTES
Panel; Future  -     T4; Future  -     TSH; Future    Hyperlipidemia, unspecified hyperlipidemia type  -     CBC with Auto Differential; Future  -     Comprehensive Metabolic Panel; Future  -     Hemoglobin A1C; Future  -     Lipid Panel; Future  -     T4; Future  -     TSH; Future    Other orders  -     amitriptyline (ELAVIL) 50 MG tablet; 1 po nightly            Return in about 3 months (around 10/3/2023). Mary Tompkins DO    Note was generated with the assistance of voice recognition software. Document was reviewed however may contain grammatical errors.

## 2023-09-22 ENCOUNTER — TELEPHONE (OUTPATIENT)
Dept: PRIMARY CARE CLINIC | Age: 58
End: 2023-09-22

## 2023-09-22 NOTE — TELEPHONE ENCOUNTER
I'm having a difficult time again today, unable to concentrate. I have an appointment on 10/03 with you. I will wait until then unless you think otherwise.

## 2023-09-26 ENCOUNTER — OFFICE VISIT (OUTPATIENT)
Dept: FAMILY MEDICINE CLINIC | Age: 58
End: 2023-09-26
Payer: COMMERCIAL

## 2023-09-26 VITALS
OXYGEN SATURATION: 97 % | SYSTOLIC BLOOD PRESSURE: 120 MMHG | HEART RATE: 76 BPM | TEMPERATURE: 97.7 F | DIASTOLIC BLOOD PRESSURE: 82 MMHG

## 2023-09-26 DIAGNOSIS — F41.9 ANXIETY: ICD-10-CM

## 2023-09-26 DIAGNOSIS — F07.81 POST CONCUSSION SYNDROME: ICD-10-CM

## 2023-09-26 DIAGNOSIS — F07.81 POST CONCUSSION SYNDROME: Primary | ICD-10-CM

## 2023-09-26 DIAGNOSIS — E66.01 SEVERE OBESITY (BMI 35.0-39.9) WITH COMORBIDITY (HCC): ICD-10-CM

## 2023-09-26 DIAGNOSIS — R26.89 BALANCE DISORDER: ICD-10-CM

## 2023-09-26 DIAGNOSIS — H93.13 TINNITUS OF BOTH EARS: ICD-10-CM

## 2023-09-26 DIAGNOSIS — F32.89 OTHER DEPRESSION: ICD-10-CM

## 2023-09-26 LAB
ABSOLUTE IMMATURE GRANULOCYTE: <0.03 K/UL (ref 0–0.58)
ALBUMIN SERPL-MCNC: 4.5 G/DL (ref 3.5–5.2)
ALP BLD-CCNC: 95 U/L (ref 35–104)
ALT SERPL-CCNC: 64 U/L (ref 0–32)
ANION GAP SERPL CALCULATED.3IONS-SCNC: 16 MMOL/L (ref 7–16)
AST SERPL-CCNC: 39 U/L (ref 0–31)
BASOPHILS ABSOLUTE: 0.03 K/UL (ref 0–0.2)
BASOPHILS RELATIVE PERCENT: 1 % (ref 0–2)
BILIRUB SERPL-MCNC: 0.6 MG/DL (ref 0–1.2)
BUN BLDV-MCNC: 16 MG/DL (ref 6–20)
C-REACTIVE PROTEIN: 6 MG/L (ref 0–5)
CALCIUM SERPL-MCNC: 9.6 MG/DL (ref 8.6–10.2)
CHLORIDE BLD-SCNC: 103 MMOL/L (ref 98–107)
CO2: 21 MMOL/L (ref 22–29)
CREAT SERPL-MCNC: 1.1 MG/DL (ref 0.5–1)
EOSINOPHILS ABSOLUTE: 0.66 K/UL (ref 0.05–0.5)
EOSINOPHILS RELATIVE PERCENT: 13 % (ref 0–6)
FOLATE: 6.8 NG/ML (ref 4.8–24.2)
GFR SERPL CREATININE-BSD FRML MDRD: >60 ML/MIN/1.73M2
GLUCOSE BLD-MCNC: 126 MG/DL (ref 74–99)
HCT VFR BLD CALC: 40.8 % (ref 34–48)
HEMOGLOBIN: 13.4 G/DL (ref 11.5–15.5)
IMMATURE GRANULOCYTES: 0 % (ref 0–5)
LYMPHOCYTES ABSOLUTE: 1.07 K/UL (ref 1.5–4)
LYMPHOCYTES RELATIVE PERCENT: 21 % (ref 20–42)
MCH RBC QN AUTO: 31.5 PG (ref 26–35)
MCHC RBC AUTO-ENTMCNC: 32.8 G/DL (ref 32–34.5)
MCV RBC AUTO: 96 FL (ref 80–99.9)
MONOCYTES ABSOLUTE: 0.54 K/UL (ref 0.1–0.95)
MONOCYTES RELATIVE PERCENT: 10 % (ref 2–12)
NEUTROPHILS ABSOLUTE: 2.9 K/UL (ref 1.8–7.3)
NEUTROPHILS RELATIVE PERCENT: 56 % (ref 43–80)
PDW BLD-RTO: 13.5 % (ref 11.5–15)
PLATELET # BLD: 353 K/UL (ref 130–450)
PMV BLD AUTO: 9.3 FL (ref 7–12)
POTASSIUM SERPL-SCNC: 4.7 MMOL/L (ref 3.5–5)
RBC # BLD: 4.25 M/UL (ref 3.5–5.5)
SODIUM BLD-SCNC: 140 MMOL/L (ref 132–146)
T4 TOTAL: 6.5 UG/DL (ref 4.5–11.7)
TOTAL PROTEIN: 7.6 G/DL (ref 6.4–8.3)
TSH SERPL DL<=0.05 MIU/L-ACNC: 1.28 UIU/ML (ref 0.27–4.2)
VITAMIN B-12: 593 PG/ML (ref 211–946)
WBC # BLD: 5.2 K/UL (ref 4.5–11.5)

## 2023-09-26 PROCEDURE — 3079F DIAST BP 80-89 MM HG: CPT | Performed by: FAMILY MEDICINE

## 2023-09-26 PROCEDURE — 99214 OFFICE O/P EST MOD 30 MIN: CPT | Performed by: FAMILY MEDICINE

## 2023-09-26 PROCEDURE — 3074F SYST BP LT 130 MM HG: CPT | Performed by: FAMILY MEDICINE

## 2023-09-26 NOTE — PROGRESS NOTES
23     Hamlet Edwards    : 1965 Sex: female   Age: 61 y.o. Chief Complaint   Patient presents with    Concussion     Almost 1 1/2 years ago. Still having headaches and cognitive issues        Prior to Admission medications    Medication Sig Start Date End Date Taking? Authorizing Provider   amitriptyline (ELAVIL) 50 MG tablet 1 po nightly 7/3/23  Yes Jolanta Oates, DO   blood glucose monitor strips Test one times a day 23  Yes Venus Bauer, DO   Lancets MISC 1 each by Does not apply route daily 23  Yes Venus Bauer DO   busPIRone (BUSPAR) 5 MG tablet Take 1 tablet by mouth 2 times daily 23  Yes Jolanta Oates DO   venlafaxine (EFFEXOR XR) 75 MG extended release capsule Take 1 capsule by mouth daily 23  Yes Jolanta Oates DO   rosuvastatin (CRESTOR) 5 MG tablet Take 1 tablet by mouth daily 22  Yes Jolanta Oates DO   pantoprazole (PROTONIX) 40 MG tablet Take 1 tablet by mouth daily 22  Yes Jolanta Oates DO   metFORMIN (GLUCOPHAGE-XR) 500 MG extended release tablet 2 morning  2 dinner 22  Yes Venus Bauer DO   NONFORMULARY Neurotrophin PMG   Yes Historical Provider, MD   Omega-3 Fatty Acids (OMEGA-3 FISH OIL PO) Take by mouth   Yes Historical Provider, MD   TURMERIC PO Take by mouth   Yes Historical Provider, MD   NONFORMULARY Cataplex G   Yes Historical Provider, MD   NONFORMULARY Asura Brain   Yes Historical Provider, MD   acetaminophen (TYLENOL) 325 MG tablet Take 2 tablets by mouth every 6 hours as needed for Pain   Yes Historical Provider, MD   Melatonin 10 MG TABS Take by mouth   Yes Historical Provider, MD          HPI: Patient evaluated today postconcussion syndrome obesity depression bilateral tinnitus after her head injury balance disorder and anxiety. All medications reviewed and stable we will continue as prescribed.   Neurologically she is stable but complaints of being off balance and intermittent problems with

## 2023-09-27 LAB — SEDIMENTATION RATE, ERYTHROCYTE: 11 MM/HR (ref 0–20)

## 2023-10-03 ENCOUNTER — OFFICE VISIT (OUTPATIENT)
Dept: PRIMARY CARE CLINIC | Age: 58
End: 2023-10-03
Payer: COMMERCIAL

## 2023-10-03 VITALS
HEIGHT: 63 IN | OXYGEN SATURATION: 97 % | HEART RATE: 81 BPM | BODY MASS INDEX: 39.16 KG/M2 | SYSTOLIC BLOOD PRESSURE: 132 MMHG | DIASTOLIC BLOOD PRESSURE: 82 MMHG | TEMPERATURE: 97.6 F | WEIGHT: 221 LBS

## 2023-10-03 DIAGNOSIS — E78.00 PURE HYPERCHOLESTEROLEMIA: ICD-10-CM

## 2023-10-03 DIAGNOSIS — F07.81 POST CONCUSSION SYNDROME: ICD-10-CM

## 2023-10-03 DIAGNOSIS — R26.89 BALANCE DISORDER: ICD-10-CM

## 2023-10-03 DIAGNOSIS — E11.9 TYPE 2 DIABETES MELLITUS WITHOUT COMPLICATION, WITHOUT LONG-TERM CURRENT USE OF INSULIN (HCC): ICD-10-CM

## 2023-10-03 DIAGNOSIS — R53.83 OTHER FATIGUE: ICD-10-CM

## 2023-10-03 DIAGNOSIS — F41.9 ANXIETY: ICD-10-CM

## 2023-10-03 DIAGNOSIS — R74.8 ELEVATED LIVER ENZYMES: ICD-10-CM

## 2023-10-03 DIAGNOSIS — I10 ESSENTIAL HYPERTENSION: Primary | ICD-10-CM

## 2023-10-03 PROCEDURE — 3044F HG A1C LEVEL LT 7.0%: CPT | Performed by: FAMILY MEDICINE

## 2023-10-03 PROCEDURE — 99214 OFFICE O/P EST MOD 30 MIN: CPT | Performed by: FAMILY MEDICINE

## 2023-10-03 PROCEDURE — 3079F DIAST BP 80-89 MM HG: CPT | Performed by: FAMILY MEDICINE

## 2023-10-03 PROCEDURE — 3075F SYST BP GE 130 - 139MM HG: CPT | Performed by: FAMILY MEDICINE

## 2023-10-03 NOTE — PROGRESS NOTES
Constitutional: Negative. HENT: Negative. Eyes: Negative. Respiratory: Negative. Gastrointestinal: Negative. Endocrine: Negative. Genitourinary: Negative. Musculoskeletal: Negative. Skin: Negative. Allergic/Immunologic: Negative. Neurological: Negative. Hematological: Negative. Psychiatric/Behavioral: Negative. Today systems review overall stable aside from HPI complaints as noted.         Current Outpatient Medications:     amitriptyline (ELAVIL) 50 MG tablet, 1 po nightly, Disp: 90 tablet, Rfl: 3    blood glucose monitor strips, Test one times a day, Disp: 400 strip, Rfl: 1    Lancets MISC, 1 each by Does not apply route daily, Disp: 300 each, Rfl: 3    busPIRone (BUSPAR) 5 MG tablet, Take 1 tablet by mouth 2 times daily, Disp: 180 tablet, Rfl: 3    venlafaxine (EFFEXOR XR) 75 MG extended release capsule, Take 1 capsule by mouth daily, Disp: 30 capsule, Rfl: 3    rosuvastatin (CRESTOR) 5 MG tablet, Take 1 tablet by mouth daily, Disp: 90 tablet, Rfl: 3    pantoprazole (PROTONIX) 40 MG tablet, Take 1 tablet by mouth daily, Disp: 90 tablet, Rfl: 3    metFORMIN (GLUCOPHAGE-XR) 500 MG extended release tablet, 2 morning  2 dinner, Disp: 360 tablet, Rfl: 3    NONFORMULARY, Neurotrophin PMG, Disp: , Rfl:     Omega-3 Fatty Acids (OMEGA-3 FISH OIL PO), Take by mouth, Disp: , Rfl:     TURMERIC PO, Take by mouth, Disp: , Rfl:     NONFORMULARY, Cataplex G, Disp: , Rfl:     NONFORMULARY, Asura Brain, Disp: , Rfl:     acetaminophen (TYLENOL) 325 MG tablet, Take 2 tablets by mouth every 6 hours as needed for Pain, Disp: , Rfl:     Melatonin 10 MG TABS, Take by mouth, Disp: , Rfl:     No Known Allergies    Social History     Tobacco Use    Smoking status: Never    Smokeless tobacco: Never   Vaping Use    Vaping Use: Never used   Substance Use Topics    Alcohol use: Not Currently    Drug use: Not Currently      Past Surgical History:   Procedure Laterality Date    TONSILLECTOMY      at

## 2023-12-28 RX ORDER — VENLAFAXINE HYDROCHLORIDE 75 MG/1
75 CAPSULE, EXTENDED RELEASE ORAL DAILY
Qty: 30 CAPSULE | Refills: 3 | Status: SHIPPED | OUTPATIENT
Start: 2023-12-28

## 2023-12-28 RX ORDER — ROSUVASTATIN CALCIUM 5 MG/1
5 TABLET, COATED ORAL DAILY
Qty: 90 TABLET | Refills: 3 | Status: SHIPPED | OUTPATIENT
Start: 2023-12-28

## 2023-12-28 RX ORDER — METFORMIN HYDROCHLORIDE 500 MG/1
TABLET, EXTENDED RELEASE ORAL
Qty: 360 TABLET | Refills: 3 | Status: SHIPPED | OUTPATIENT
Start: 2023-12-28

## 2023-12-28 RX ORDER — PANTOPRAZOLE SODIUM 40 MG/1
40 TABLET, DELAYED RELEASE ORAL DAILY
Qty: 90 TABLET | Refills: 3 | Status: SHIPPED | OUTPATIENT
Start: 2023-12-28

## 2023-12-28 NOTE — TELEPHONE ENCOUNTER
Pt says she accidentally forgot about her last appt so she's listed as a no show. Did make a new appt. Though.

## 2023-12-28 NOTE — TELEPHONE ENCOUNTER
Patients last appointment 10/3/2023.   Patients next scheduled appointment   Future Appointments   Date Time Provider 4600  46[facility]   1/3/2024  3:30 PM DO ZAHRA [facility]ao [facility]

## 2023-12-29 DIAGNOSIS — E78.5 HYPERLIPIDEMIA, UNSPECIFIED HYPERLIPIDEMIA TYPE: ICD-10-CM

## 2023-12-29 DIAGNOSIS — E11.9 TYPE 2 DIABETES MELLITUS WITHOUT COMPLICATION, WITHOUT LONG-TERM CURRENT USE OF INSULIN (HCC): ICD-10-CM

## 2023-12-29 DIAGNOSIS — I10 ESSENTIAL HYPERTENSION: ICD-10-CM

## 2023-12-29 DIAGNOSIS — R73.01 IMPAIRED FASTING GLUCOSE: ICD-10-CM

## 2023-12-29 LAB
ABSOLUTE IMMATURE GRANULOCYTE: 0.04 K/UL (ref 0–0.58)
ALBUMIN SERPL-MCNC: 4.3 G/DL (ref 3.5–5.2)
ALP BLD-CCNC: 96 U/L (ref 35–104)
ALT SERPL-CCNC: 67 U/L (ref 0–32)
ANION GAP SERPL CALCULATED.3IONS-SCNC: 17 MMOL/L (ref 7–16)
AST SERPL-CCNC: 58 U/L (ref 0–31)
BASOPHILS ABSOLUTE: 0.07 K/UL (ref 0–0.2)
BASOPHILS RELATIVE PERCENT: 1 % (ref 0–2)
BILIRUB SERPL-MCNC: 0.6 MG/DL (ref 0–1.2)
BUN BLDV-MCNC: 11 MG/DL (ref 6–20)
CALCIUM SERPL-MCNC: 9.5 MG/DL (ref 8.6–10.2)
CHLORIDE BLD-SCNC: 100 MMOL/L (ref 98–107)
CHOLESTEROL: 160 MG/DL
CO2: 22 MMOL/L (ref 22–29)
CREAT SERPL-MCNC: 1 MG/DL (ref 0.5–1)
EOSINOPHILS ABSOLUTE: 0.41 K/UL (ref 0.05–0.5)
EOSINOPHILS RELATIVE PERCENT: 7 % (ref 0–6)
GFR SERPL CREATININE-BSD FRML MDRD: >60 ML/MIN/1.73M2
GLUCOSE BLD-MCNC: 166 MG/DL (ref 74–99)
HBA1C MFR BLD: 7.2 % (ref 4–5.6)
HCT VFR BLD CALC: 41.3 % (ref 34–48)
HDLC SERPL-MCNC: 45 MG/DL
HEMOGLOBIN: 13.9 G/DL (ref 11.5–15.5)
IMMATURE GRANULOCYTES: 1 % (ref 0–5)
LDL CHOLESTEROL: 88 MG/DL
LYMPHOCYTES ABSOLUTE: 1.27 K/UL (ref 1.5–4)
LYMPHOCYTES RELATIVE PERCENT: 20 % (ref 20–42)
MCH RBC QN AUTO: 31 PG (ref 26–35)
MCHC RBC AUTO-ENTMCNC: 33.7 G/DL (ref 32–34.5)
MCV RBC AUTO: 92.2 FL (ref 80–99.9)
MONOCYTES ABSOLUTE: 0.63 K/UL (ref 0.1–0.95)
MONOCYTES RELATIVE PERCENT: 10 % (ref 2–12)
NEUTROPHILS ABSOLUTE: 3.82 K/UL (ref 1.8–7.3)
NEUTROPHILS RELATIVE PERCENT: 61 % (ref 43–80)
PDW BLD-RTO: 13.5 % (ref 11.5–15)
PLATELET # BLD: 353 K/UL (ref 130–450)
PMV BLD AUTO: 9.6 FL (ref 7–12)
POTASSIUM SERPL-SCNC: 4.6 MMOL/L (ref 3.5–5)
RBC # BLD: 4.48 M/UL (ref 3.5–5.5)
SODIUM BLD-SCNC: 139 MMOL/L (ref 132–146)
T4 TOTAL: 6.6 UG/DL (ref 4.5–11.7)
TOTAL PROTEIN: 7.5 G/DL (ref 6.4–8.3)
TRIGL SERPL-MCNC: 134 MG/DL
TSH SERPL DL<=0.05 MIU/L-ACNC: 1.45 UIU/ML (ref 0.27–4.2)
VLDLC SERPL CALC-MCNC: 27 MG/DL
WBC # BLD: 6.2 K/UL (ref 4.5–11.5)

## 2024-01-03 ENCOUNTER — OFFICE VISIT (OUTPATIENT)
Dept: PRIMARY CARE CLINIC | Age: 59
End: 2024-01-03
Payer: COMMERCIAL

## 2024-01-03 VITALS
OXYGEN SATURATION: 96 % | TEMPERATURE: 98.6 F | DIASTOLIC BLOOD PRESSURE: 86 MMHG | WEIGHT: 225 LBS | SYSTOLIC BLOOD PRESSURE: 134 MMHG | BODY MASS INDEX: 39.87 KG/M2 | HEART RATE: 80 BPM | HEIGHT: 63 IN

## 2024-01-03 DIAGNOSIS — E78.00 PURE HYPERCHOLESTEROLEMIA: ICD-10-CM

## 2024-01-03 DIAGNOSIS — F07.81 POST CONCUSSION SYNDROME: ICD-10-CM

## 2024-01-03 DIAGNOSIS — E11.9 TYPE 2 DIABETES MELLITUS WITHOUT COMPLICATION, WITHOUT LONG-TERM CURRENT USE OF INSULIN (HCC): ICD-10-CM

## 2024-01-03 DIAGNOSIS — I10 ESSENTIAL HYPERTENSION: Primary | ICD-10-CM

## 2024-01-03 DIAGNOSIS — I60.9 SUBARACHNOID HEMORRHAGE (HCC): ICD-10-CM

## 2024-01-03 DIAGNOSIS — F41.9 ANXIETY: ICD-10-CM

## 2024-01-03 DIAGNOSIS — S06.5XAA SDH (SUBDURAL HEMATOMA) (HCC): ICD-10-CM

## 2024-01-03 DIAGNOSIS — E66.01 SEVERE OBESITY (BMI 35.0-39.9) WITH COMORBIDITY (HCC): ICD-10-CM

## 2024-01-03 DIAGNOSIS — Z12.31 BREAST CANCER SCREENING BY MAMMOGRAM: ICD-10-CM

## 2024-01-03 PROCEDURE — 3079F DIAST BP 80-89 MM HG: CPT | Performed by: FAMILY MEDICINE

## 2024-01-03 PROCEDURE — 99214 OFFICE O/P EST MOD 30 MIN: CPT | Performed by: FAMILY MEDICINE

## 2024-01-03 PROCEDURE — 3075F SYST BP GE 130 - 139MM HG: CPT | Performed by: FAMILY MEDICINE

## 2024-01-03 RX ORDER — AMITRIPTYLINE HYDROCHLORIDE 25 MG/1
TABLET, FILM COATED ORAL
Qty: 180 TABLET | Refills: 3 | Status: SHIPPED | OUTPATIENT
Start: 2024-01-03

## 2024-01-03 ASSESSMENT — PATIENT HEALTH QUESTIONNAIRE - PHQ9
2. FEELING DOWN, DEPRESSED OR HOPELESS: 1
SUM OF ALL RESPONSES TO PHQ QUESTIONS 1-9: 1
SUM OF ALL RESPONSES TO PHQ QUESTIONS 1-9: 1
8. MOVING OR SPEAKING SO SLOWLY THAT OTHER PEOPLE COULD HAVE NOTICED. OR THE OPPOSITE, BEING SO FIGETY OR RESTLESS THAT YOU HAVE BEEN MOVING AROUND A LOT MORE THAN USUAL: 0
1. LITTLE INTEREST OR PLEASURE IN DOING THINGS: 0
9. THOUGHTS THAT YOU WOULD BE BETTER OFF DEAD, OR OF HURTING YOURSELF: 0
7. TROUBLE CONCENTRATING ON THINGS, SUCH AS READING THE NEWSPAPER OR WATCHING TELEVISION: 0
SUM OF ALL RESPONSES TO PHQ9 QUESTIONS 1 & 2: 1
6. FEELING BAD ABOUT YOURSELF - OR THAT YOU ARE A FAILURE OR HAVE LET YOURSELF OR YOUR FAMILY DOWN: 0
SUM OF ALL RESPONSES TO PHQ QUESTIONS 1-9: 1
SUM OF ALL RESPONSES TO PHQ QUESTIONS 1-9: 1
3. TROUBLE FALLING OR STAYING ASLEEP: 0
10. IF YOU CHECKED OFF ANY PROBLEMS, HOW DIFFICULT HAVE THESE PROBLEMS MADE IT FOR YOU TO DO YOUR WORK, TAKE CARE OF THINGS AT HOME, OR GET ALONG WITH OTHER PEOPLE: 0
4. FEELING TIRED OR HAVING LITTLE ENERGY: 0
5. POOR APPETITE OR OVEREATING: 0

## 2024-01-03 ASSESSMENT — ENCOUNTER SYMPTOMS
GASTROINTESTINAL NEGATIVE: 1
EYES NEGATIVE: 1
RESPIRATORY NEGATIVE: 1
ALLERGIC/IMMUNOLOGIC NEGATIVE: 1

## 2024-01-03 NOTE — PROGRESS NOTES
1/3/24     Francine Sesay    : 1965 Sex: female   Age: 58 y.o.      Chief Complaint   Patient presents with    Hypertension    Discuss Labs       Prior to Admission medications    Medication Sig Start Date End Date Taking? Authorizing Provider   amitriptyline (ELAVIL) 25 MG tablet 1-2  po nightly 1/3/24  Yes Yunior Oates DO   venlafaxine (EFFEXOR XR) 75 MG extended release capsule Take 1 capsule by mouth daily 23  Yes Yunior Oates DO   pantoprazole (PROTONIX) 40 MG tablet TAKE 1 TABLET DAILY 23  Yes Yunior Oates DO   rosuvastatin (CRESTOR) 5 MG tablet TAKE 1 TABLET DAILY 23  Yes Yunior Oates DO   metFORMIN (GLUCOPHAGE-XR) 500 MG extended release tablet TAKE 2 TABLETS IN THE MORNING AND 2 TABLETS AT DINNER 23  Yes Yunior Oates DO   blood glucose monitor strips Test one times a day 23  Yes Yunior Oates DO   Lancets MISC 1 each by Does not apply route daily 23  Yes Yunior Oates DO   busPIRone (BUSPAR) 5 MG tablet Take 1 tablet by mouth 2 times daily 23  Yes Yunior Oates DO   NONFORMULARY Neurotrophin PMG   Yes Tu Juan MD   Omega-3 Fatty Acids (OMEGA-3 FISH OIL PO) Take by mouth   Yes Tu Juan MD   TURMERIC PO Take by mouth   Yes Tu Juan MD   NONFORMULARY Cataplex G   Yes Tu Juan MD   NONFORMULARY Asura Brain   Yes Tu Juan MD   acetaminophen (TYLENOL) 325 MG tablet Take 2 tablets by mouth every 6 hours as needed for Pain   Yes Tu Juan MD   Melatonin 10 MG TABS Take by mouth   Yes Tu Juan MD          HPI: Patient seen today in medical follow-up recent MRI study and follow-up on subarachnoid hemorrhage and subdural hematoma and overall findings were stable but some chronic degenerative changes present.  Following closely with neurology and now back to physical and speech therapy.  Postconcussion syndrome.  Hypertensive disease

## 2024-01-04 DIAGNOSIS — Z12.31 ENCOUNTER FOR SCREENING MAMMOGRAM FOR MALIGNANT NEOPLASM OF BREAST: Primary | ICD-10-CM

## 2024-03-27 ENCOUNTER — OFFICE VISIT (OUTPATIENT)
Dept: PRIMARY CARE CLINIC | Age: 59
End: 2024-03-27
Payer: COMMERCIAL

## 2024-03-27 VITALS
TEMPERATURE: 98.2 F | HEART RATE: 79 BPM | HEIGHT: 63 IN | SYSTOLIC BLOOD PRESSURE: 120 MMHG | BODY MASS INDEX: 40.75 KG/M2 | OXYGEN SATURATION: 98 % | DIASTOLIC BLOOD PRESSURE: 80 MMHG | WEIGHT: 230 LBS

## 2024-03-27 DIAGNOSIS — I10 ESSENTIAL HYPERTENSION: Primary | ICD-10-CM

## 2024-03-27 DIAGNOSIS — E11.9 TYPE 2 DIABETES MELLITUS WITHOUT COMPLICATION, WITHOUT LONG-TERM CURRENT USE OF INSULIN (HCC): ICD-10-CM

## 2024-03-27 DIAGNOSIS — E78.00 PURE HYPERCHOLESTEROLEMIA: ICD-10-CM

## 2024-03-27 DIAGNOSIS — Z86.79 HISTORY OF SUBARACHNOID HEMORRHAGE: ICD-10-CM

## 2024-03-27 DIAGNOSIS — F41.9 ANXIETY: ICD-10-CM

## 2024-03-27 DIAGNOSIS — R74.8 ELEVATED LIVER ENZYMES: ICD-10-CM

## 2024-03-27 PROCEDURE — 3079F DIAST BP 80-89 MM HG: CPT | Performed by: FAMILY MEDICINE

## 2024-03-27 PROCEDURE — 3074F SYST BP LT 130 MM HG: CPT | Performed by: FAMILY MEDICINE

## 2024-03-27 PROCEDURE — 99214 OFFICE O/P EST MOD 30 MIN: CPT | Performed by: FAMILY MEDICINE

## 2024-03-27 RX ORDER — GLUCOSAMINE HCL/CHONDROITIN SU 500-400 MG
CAPSULE ORAL
Qty: 400 STRIP | Refills: 1 | Status: SHIPPED | OUTPATIENT
Start: 2024-03-27

## 2024-03-27 RX ORDER — VENLAFAXINE HYDROCHLORIDE 75 MG/1
75 CAPSULE, EXTENDED RELEASE ORAL DAILY
Qty: 90 CAPSULE | Refills: 2 | Status: SHIPPED | OUTPATIENT
Start: 2024-03-27

## 2024-03-27 RX ORDER — TIRZEPATIDE 2.5 MG/.5ML
INJECTION, SOLUTION SUBCUTANEOUS
Qty: 4 EACH | Refills: 1 | Status: SHIPPED | OUTPATIENT
Start: 2024-03-27

## 2024-03-27 NOTE — PROGRESS NOTES
used   Substance Use Topics    Alcohol use: Not Currently    Drug use: Not Currently      Past Surgical History:   Procedure Laterality Date    TONSILLECTOMY      at age 5     Family History   Problem Relation Age of Onset    Diabetes Father     Heart Disease Father      Past Medical History:   Diagnosis Date    Depression     Diabetes mellitus (HCC)     Gastritis     Hyperlipidemia        Vitals:    03/27/24 1143   BP: 120/80   Pulse: 79   Temp: 98.2 °F (36.8 °C)   SpO2: 98%   Weight: 104.3 kg (230 lb)   Height: 1.6 m (5' 3\")     BP Readings from Last 3 Encounters:   03/27/24 120/80   01/03/24 134/86   10/03/23 132/82      Body mass index is 40.74 kg/m².   Physical Exam  Vitals and nursing note reviewed.   Constitutional:       Appearance: She is well-developed.   HENT:      Head: Normocephalic.      Right Ear: External ear normal.      Left Ear: External ear normal.      Nose: Nose normal.   Eyes:      Conjunctiva/sclera: Conjunctivae normal.      Pupils: Pupils are equal, round, and reactive to light.   Cardiovascular:      Rate and Rhythm: Normal rate.   Pulmonary:      Breath sounds: Normal breath sounds.   Abdominal:      General: Bowel sounds are normal.      Palpations: Abdomen is soft.   Musculoskeletal:         General: Normal range of motion.      Cervical back: Normal range of motion and neck supple.   Skin:     General: Skin is warm and dry.   Neurological:      Mental Status: She is alert and oriented to person, place, and time.   Psychiatric:         Behavior: Behavior normal.        Afebrile vital stable.  Heart regular lungs clear.  Medications as prescribed.  Follow-up visit with me next 4 weeks to 6 weeks and sooner if problems.      Lab Results   Component Value Date    TSH 1.45 12/29/2023    TSH 1.28 09/26/2023    X5BAXVD 94.2 09/20/2013    Z7SIZJT 6.6 12/29/2023    H1QXBAY 6.5 09/26/2023     Lab Results   Component Value Date    CHOL 160 12/29/2023    CHOL 144 05/31/2023     Lab Results

## 2024-05-03 DIAGNOSIS — E78.00 PURE HYPERCHOLESTEROLEMIA: ICD-10-CM

## 2024-05-03 DIAGNOSIS — R74.8 ELEVATED LIVER ENZYMES: ICD-10-CM

## 2024-05-03 DIAGNOSIS — E11.9 TYPE 2 DIABETES MELLITUS WITHOUT COMPLICATION, WITHOUT LONG-TERM CURRENT USE OF INSULIN (HCC): ICD-10-CM

## 2024-05-03 DIAGNOSIS — I10 ESSENTIAL HYPERTENSION: ICD-10-CM

## 2024-05-03 LAB
ALBUMIN: 4.3 G/DL (ref 3.5–5.2)
ALP BLD-CCNC: 94 U/L (ref 35–104)
ALT SERPL-CCNC: 51 U/L (ref 0–32)
ANION GAP SERPL CALCULATED.3IONS-SCNC: 15 MMOL/L (ref 7–16)
AST SERPL-CCNC: 41 U/L (ref 0–31)
BASOPHILS ABSOLUTE: 0.05 K/UL (ref 0–0.2)
BASOPHILS RELATIVE PERCENT: 1 % (ref 0–2)
BILIRUB SERPL-MCNC: 0.3 MG/DL (ref 0–1.2)
BUN BLDV-MCNC: 13 MG/DL (ref 6–20)
CALCIUM SERPL-MCNC: 9 MG/DL (ref 8.6–10.2)
CHLORIDE BLD-SCNC: 103 MMOL/L (ref 98–107)
CHOLESTEROL, TOTAL: 143 MG/DL
CO2: 21 MMOL/L (ref 22–29)
CREAT SERPL-MCNC: 1 MG/DL (ref 0.5–1)
EOSINOPHILS ABSOLUTE: 0.46 K/UL (ref 0.05–0.5)
EOSINOPHILS RELATIVE PERCENT: 8 % (ref 0–6)
GFR, ESTIMATED: 69 ML/MIN/1.73M2
GLUCOSE BLD-MCNC: 167 MG/DL (ref 74–99)
HBA1C MFR BLD: 7.1 % (ref 4–5.6)
HCT VFR BLD CALC: 39.8 % (ref 34–48)
HDLC SERPL-MCNC: 36 MG/DL
HEMOGLOBIN: 13.2 G/DL (ref 11.5–15.5)
IMMATURE GRANULOCYTES %: 0 % (ref 0–5)
IMMATURE GRANULOCYTES ABSOLUTE: <0.03 K/UL (ref 0–0.58)
LDL CHOLESTEROL: 81 MG/DL
LYMPHOCYTES ABSOLUTE: 1.32 K/UL (ref 1.5–4)
LYMPHOCYTES RELATIVE PERCENT: 22 % (ref 20–42)
MCH RBC QN AUTO: 30.7 PG (ref 26–35)
MCHC RBC AUTO-ENTMCNC: 33.2 G/DL (ref 32–34.5)
MCV RBC AUTO: 92.6 FL (ref 80–99.9)
MONOCYTES ABSOLUTE: 0.62 K/UL (ref 0.1–0.95)
MONOCYTES RELATIVE PERCENT: 10 % (ref 2–12)
NEUTROPHILS ABSOLUTE: 3.49 K/UL (ref 1.8–7.3)
NEUTROPHILS RELATIVE PERCENT: 59 % (ref 43–80)
PDW BLD-RTO: 13.7 % (ref 11.5–15)
PLATELET # BLD: 347 K/UL (ref 130–450)
PMV BLD AUTO: 9.7 FL (ref 7–12)
POTASSIUM SERPL-SCNC: 4.6 MMOL/L (ref 3.5–5)
RBC # BLD: 4.3 M/UL (ref 3.5–5.5)
SODIUM BLD-SCNC: 139 MMOL/L (ref 132–146)
T4 TOTAL: 6.3 UG/DL (ref 4.5–11.7)
TOTAL PROTEIN: 7.1 G/DL (ref 6.4–8.3)
TRIGL SERPL-MCNC: 129 MG/DL
TSH SERPL DL<=0.05 MIU/L-ACNC: 1.22 UIU/ML (ref 0.27–4.2)
VLDLC SERPL CALC-MCNC: 26 MG/DL
WBC # BLD: 6 K/UL (ref 4.5–11.5)

## 2024-06-11 ENCOUNTER — OFFICE VISIT (OUTPATIENT)
Dept: PRIMARY CARE CLINIC | Age: 59
End: 2024-06-11
Payer: COMMERCIAL

## 2024-06-11 VITALS
DIASTOLIC BLOOD PRESSURE: 80 MMHG | WEIGHT: 228 LBS | OXYGEN SATURATION: 98 % | HEART RATE: 90 BPM | BODY MASS INDEX: 40.4 KG/M2 | SYSTOLIC BLOOD PRESSURE: 140 MMHG | TEMPERATURE: 98.4 F | HEIGHT: 63 IN

## 2024-06-11 DIAGNOSIS — F41.9 ANXIETY: ICD-10-CM

## 2024-06-11 DIAGNOSIS — I10 ESSENTIAL HYPERTENSION: ICD-10-CM

## 2024-06-11 DIAGNOSIS — E11.9 TYPE 2 DIABETES MELLITUS WITHOUT COMPLICATION, WITHOUT LONG-TERM CURRENT USE OF INSULIN (HCC): Primary | ICD-10-CM

## 2024-06-11 DIAGNOSIS — Z86.79 HISTORY OF SUBARACHNOID HEMORRHAGE: ICD-10-CM

## 2024-06-11 DIAGNOSIS — E78.00 PURE HYPERCHOLESTEROLEMIA: ICD-10-CM

## 2024-06-11 PROCEDURE — 3017F COLORECTAL CA SCREEN DOC REV: CPT | Performed by: FAMILY MEDICINE

## 2024-06-11 PROCEDURE — 99214 OFFICE O/P EST MOD 30 MIN: CPT | Performed by: FAMILY MEDICINE

## 2024-06-11 PROCEDURE — 3079F DIAST BP 80-89 MM HG: CPT | Performed by: FAMILY MEDICINE

## 2024-06-11 PROCEDURE — G8427 DOCREV CUR MEDS BY ELIG CLIN: HCPCS | Performed by: FAMILY MEDICINE

## 2024-06-11 PROCEDURE — 3051F HG A1C>EQUAL 7.0%<8.0%: CPT | Performed by: FAMILY MEDICINE

## 2024-06-11 PROCEDURE — 3077F SYST BP >= 140 MM HG: CPT | Performed by: FAMILY MEDICINE

## 2024-06-11 PROCEDURE — 2022F DILAT RTA XM EVC RTNOPTHY: CPT | Performed by: FAMILY MEDICINE

## 2024-06-11 PROCEDURE — G8417 CALC BMI ABV UP PARAM F/U: HCPCS | Performed by: FAMILY MEDICINE

## 2024-06-11 PROCEDURE — 1036F TOBACCO NON-USER: CPT | Performed by: FAMILY MEDICINE

## 2024-06-11 RX ORDER — BUSPIRONE HYDROCHLORIDE 5 MG/1
5 TABLET ORAL 2 TIMES DAILY
Qty: 180 TABLET | Refills: 3 | Status: SHIPPED | OUTPATIENT
Start: 2024-06-11

## 2024-06-11 RX ORDER — TIRZEPATIDE 5 MG/.5ML
5 INJECTION, SOLUTION SUBCUTANEOUS WEEKLY
Qty: 4 ADJUSTABLE DOSE PRE-FILLED PEN SYRINGE | Refills: 1 | Status: SHIPPED | OUTPATIENT
Start: 2024-06-11

## 2024-06-11 SDOH — ECONOMIC STABILITY: FOOD INSECURITY: WITHIN THE PAST 12 MONTHS, YOU WORRIED THAT YOUR FOOD WOULD RUN OUT BEFORE YOU GOT MONEY TO BUY MORE.: NEVER TRUE

## 2024-06-11 SDOH — ECONOMIC STABILITY: FOOD INSECURITY: WITHIN THE PAST 12 MONTHS, THE FOOD YOU BOUGHT JUST DIDN'T LAST AND YOU DIDN'T HAVE MONEY TO GET MORE.: NEVER TRUE

## 2024-06-11 SDOH — ECONOMIC STABILITY: INCOME INSECURITY: HOW HARD IS IT FOR YOU TO PAY FOR THE VERY BASICS LIKE FOOD, HOUSING, MEDICAL CARE, AND HEATING?: NOT HARD AT ALL

## 2024-06-11 NOTE — PROGRESS NOTES
24     Francine Sesay    : 1965 Sex: female   Age: 58 y.o.      Chief Complaint   Patient presents with    Hypertension    Rash       Prior to Admission medications    Medication Sig Start Date End Date Taking? Authorizing Provider   busPIRone (BUSPAR) 5 MG tablet Take 1 tablet by mouth 2 times daily 24  Yes Yunior Oates DO   venlafaxine (EFFEXOR XR) 75 MG extended release capsule Take 1 capsule by mouth daily 3/27/24  Yes Yunior Oates DO   blood glucose monitor strips Test one times a day 3/27/24  Yes Yunior Oates DO   Tirzepatide (MOUNJARO) 2.5 MG/0.5ML SOPN SC injection 2.5mg q week 3/27/24  Yes Yunior Oates DO   amitriptyline (ELAVIL) 25 MG tablet 1-2  po nightly 1/3/24  Yes Yunior Oates DO   pantoprazole (PROTONIX) 40 MG tablet TAKE 1 TABLET DAILY 23  Yes Yunior Oates DO   rosuvastatin (CRESTOR) 5 MG tablet TAKE 1 TABLET DAILY 23  Yes Yunior Oates DO   metFORMIN (GLUCOPHAGE-XR) 500 MG extended release tablet TAKE 2 TABLETS IN THE MORNING AND 2 TABLETS AT DINNER 23  Yes Yunior Oates DO   Lancets MISC 1 each by Does not apply route daily 23  Yes Yunior Oates DO   NONFORMULARY Neurotrophin PMG   Yes Tu Juan MD   Omega-3 Fatty Acids (OMEGA-3 FISH OIL PO) Take by mouth   Yes Tu Juan MD   TURMERIC PO Take by mouth   Yes Tu Juan MD   NONFORMULARY Cataplex G   Yes Tu Juan MD   NONFORMULARY Asura Brain   Yes Tu Juan MD   acetaminophen (TYLENOL) 325 MG tablet Take 2 tablets by mouth every 6 hours as needed for Pain   Yes Tu Juan MD   Melatonin 10 MG TABS Take by mouth   Yes Tu Juan MD          HPI: Patient evaluated today diabetes hypertension hyperlipidemia anxiety and history of subarachnoid hemorrhage in the past.  Medically she is doing fairly well at this time.  She has issues of balance disorder and was recommended by

## 2024-06-17 ENCOUNTER — OFFICE VISIT (OUTPATIENT)
Dept: FAMILY MEDICINE CLINIC | Age: 59
End: 2024-06-17
Payer: COMMERCIAL

## 2024-06-17 VITALS
BODY MASS INDEX: 40.39 KG/M2 | HEIGHT: 63 IN | SYSTOLIC BLOOD PRESSURE: 138 MMHG | OXYGEN SATURATION: 98 % | HEART RATE: 78 BPM | DIASTOLIC BLOOD PRESSURE: 80 MMHG | TEMPERATURE: 98 F

## 2024-06-17 DIAGNOSIS — L03.319 CELLULITIS OF TRUNK, UNSPECIFIED SITE OF TRUNK: ICD-10-CM

## 2024-06-17 DIAGNOSIS — L23.7 POISON IVY DERMATITIS: Primary | ICD-10-CM

## 2024-06-17 DIAGNOSIS — I10 ESSENTIAL HYPERTENSION: ICD-10-CM

## 2024-06-17 PROCEDURE — G8417 CALC BMI ABV UP PARAM F/U: HCPCS | Performed by: FAMILY MEDICINE

## 2024-06-17 PROCEDURE — 3079F DIAST BP 80-89 MM HG: CPT | Performed by: FAMILY MEDICINE

## 2024-06-17 PROCEDURE — 99214 OFFICE O/P EST MOD 30 MIN: CPT | Performed by: FAMILY MEDICINE

## 2024-06-17 PROCEDURE — 1036F TOBACCO NON-USER: CPT | Performed by: FAMILY MEDICINE

## 2024-06-17 PROCEDURE — 3075F SYST BP GE 130 - 139MM HG: CPT | Performed by: FAMILY MEDICINE

## 2024-06-17 PROCEDURE — G8427 DOCREV CUR MEDS BY ELIG CLIN: HCPCS | Performed by: FAMILY MEDICINE

## 2024-06-17 PROCEDURE — 3017F COLORECTAL CA SCREEN DOC REV: CPT | Performed by: FAMILY MEDICINE

## 2024-06-17 RX ORDER — PREDNISONE 10 MG/1
TABLET ORAL
Qty: 30 TABLET | Refills: 0 | Status: SHIPPED | OUTPATIENT
Start: 2024-06-17

## 2024-06-17 RX ORDER — DOXYCYCLINE HYCLATE 100 MG/1
100 CAPSULE ORAL 2 TIMES DAILY
Qty: 20 CAPSULE | Refills: 0 | Status: SHIPPED | OUTPATIENT
Start: 2024-06-17 | End: 2024-06-27

## 2024-06-17 NOTE — PROGRESS NOTES
24     Francine Sesay    : 1965 Sex: female   Age: 58 y.o.      Chief Complaint   Patient presents with    Rash    Poison Ivy       Prior to Admission medications    Medication Sig Start Date End Date Taking? Authorizing Provider   predniSONE (DELTASONE) 10 MG tablet 3 qd x5days then 2 q dx 5days then 1 qd x5days 24  Yes Yunior Oates DO   doxycycline hyclate (VIBRAMYCIN) 100 MG capsule Take 1 capsule by mouth 2 times daily for 10 days 24 Yes Yunior Oates DO   busPIRone (BUSPAR) 5 MG tablet Take 1 tablet by mouth 2 times daily 24  Yes Yunior Oates DO   Tirzepatide (MOUNJARO) 5 MG/0.5ML SOPN SC injection Inject 0.5 mLs into the skin once a week 24  Yes Yunior Oates DO   venlafaxine (EFFEXOR XR) 75 MG extended release capsule Take 1 capsule by mouth daily 3/27/24  Yes Yunior Oates DO   blood glucose monitor strips Test one times a day 3/27/24  Yes Yunior Oates DO   amitriptyline (ELAVIL) 25 MG tablet 1-2  po nightly 1/3/24  Yes Yunior Oates DO   pantoprazole (PROTONIX) 40 MG tablet TAKE 1 TABLET DAILY 23  Yes Yunior Oates DO   rosuvastatin (CRESTOR) 5 MG tablet TAKE 1 TABLET DAILY 23  Yes Yunior Oates DO   metFORMIN (GLUCOPHAGE-XR) 500 MG extended release tablet TAKE 2 TABLETS IN THE MORNING AND 2 TABLETS AT DINNER 23  Yes Yunior Oates DO   Lancets MISC 1 each by Does not apply route daily 23  Yes Yunior Oates DO   NONFORMULARY Neurotrophin PMG   Yes Tu Juan MD   Omega-3 Fatty Acids (OMEGA-3 FISH OIL PO) Take by mouth   Yes Tu Juan MD   TURMERIC PO Take by mouth   Yes Tu Juan MD   NONFORMULARY Cataplex G   Yes Tu Juan MD   NONFORMULARY Asura Brain   Yes Tu Juan MD   acetaminophen (TYLENOL) 325 MG tablet Take 2 tablets by mouth every 6 hours as needed for Pain   Yes Provider, MD Tu   Melatonin 10 MG TABS

## 2024-06-20 RX ORDER — TIRZEPATIDE 5 MG/.5ML
5 INJECTION, SOLUTION SUBCUTANEOUS WEEKLY
Qty: 4 ADJUSTABLE DOSE PRE-FILLED PEN SYRINGE | Refills: 1 | Status: SHIPPED | OUTPATIENT
Start: 2024-06-20

## 2024-06-24 ENCOUNTER — OFFICE VISIT (OUTPATIENT)
Dept: FAMILY MEDICINE CLINIC | Age: 59
End: 2024-06-24
Payer: COMMERCIAL

## 2024-06-24 VITALS
HEIGHT: 63 IN | OXYGEN SATURATION: 98 % | BODY MASS INDEX: 40.4 KG/M2 | DIASTOLIC BLOOD PRESSURE: 78 MMHG | HEART RATE: 76 BPM | WEIGHT: 228 LBS | SYSTOLIC BLOOD PRESSURE: 128 MMHG | TEMPERATURE: 98 F

## 2024-06-24 DIAGNOSIS — I10 ESSENTIAL HYPERTENSION: ICD-10-CM

## 2024-06-24 DIAGNOSIS — L30.9 DERMATITIS: Primary | ICD-10-CM

## 2024-06-24 DIAGNOSIS — E11.9 TYPE 2 DIABETES MELLITUS WITHOUT COMPLICATION, WITHOUT LONG-TERM CURRENT USE OF INSULIN (HCC): ICD-10-CM

## 2024-06-24 PROCEDURE — 3074F SYST BP LT 130 MM HG: CPT | Performed by: FAMILY MEDICINE

## 2024-06-24 PROCEDURE — G8417 CALC BMI ABV UP PARAM F/U: HCPCS | Performed by: FAMILY MEDICINE

## 2024-06-24 PROCEDURE — 2022F DILAT RTA XM EVC RTNOPTHY: CPT | Performed by: FAMILY MEDICINE

## 2024-06-24 PROCEDURE — 3051F HG A1C>EQUAL 7.0%<8.0%: CPT | Performed by: FAMILY MEDICINE

## 2024-06-24 PROCEDURE — 3017F COLORECTAL CA SCREEN DOC REV: CPT | Performed by: FAMILY MEDICINE

## 2024-06-24 PROCEDURE — 99214 OFFICE O/P EST MOD 30 MIN: CPT | Performed by: FAMILY MEDICINE

## 2024-06-24 PROCEDURE — G8427 DOCREV CUR MEDS BY ELIG CLIN: HCPCS | Performed by: FAMILY MEDICINE

## 2024-06-24 PROCEDURE — 1036F TOBACCO NON-USER: CPT | Performed by: FAMILY MEDICINE

## 2024-06-24 PROCEDURE — 3078F DIAST BP <80 MM HG: CPT | Performed by: FAMILY MEDICINE

## 2024-06-24 RX ORDER — CLOTRIMAZOLE AND BETAMETHASONE DIPROPIONATE 10; .64 MG/G; MG/G
CREAM TOPICAL
Qty: 30 G | Refills: 1 | Status: SHIPPED | OUTPATIENT
Start: 2024-06-24

## 2024-06-24 RX ORDER — CLOTRIMAZOLE AND BETAMETHASONE DIPROPIONATE 10; .64 MG/G; MG/G
CREAM TOPICAL
Qty: 30 G | Refills: 1 | Status: SHIPPED
Start: 2024-06-24 | End: 2024-06-24 | Stop reason: SDUPTHER

## 2024-06-24 ASSESSMENT — ENCOUNTER SYMPTOMS
EYES NEGATIVE: 1
RESPIRATORY NEGATIVE: 1
GASTROINTESTINAL NEGATIVE: 1
ALLERGIC/IMMUNOLOGIC NEGATIVE: 1

## 2024-06-24 NOTE — PROGRESS NOTES
(TYLENOL) 325 MG tablet Take 2 tablets by mouth every 6 hours as needed for Pain   Yes Provider, MD Tu   Melatonin 10 MG TABS Take by mouth   Yes Provider, MD Tu          HPI: Patient seen today skin rash still persistent and she was concern for the doxycycline.  I do not believe this is a drug rash and instructed her in such.  She may continue with Benadryl as needed continue the prednisone at 10 mg a day over the next week and then added some Lotrisone cream to be used to the arm twice a day and will recheck on next visit.  Encouraged off of Neosporin ointment.  Diabetes has been stable she is on Mounjaro at 5 mg weekly but was unable to obtain the medication and encouraged her to follow-up with the pharmacy and they are to notify us if there is still problems with this dosing and we would drop back to 2.5 if needed.  Remainder meds to continue as prescribed.        Review of Systems   Constitutional: Negative.    HENT: Negative.     Eyes: Negative.    Respiratory: Negative.     Gastrointestinal: Negative.    Endocrine: Negative.    Genitourinary: Negative.    Musculoskeletal: Negative.    Skin: Negative.    Allergic/Immunologic: Negative.    Neurological: Negative.    Hematological: Negative.    Psychiatric/Behavioral: Negative.        Today systems review overall stable aside from the rash as noted difficulties with medication management and all reviewed today.        Current Outpatient Medications:     clotrimazole-betamethasone (LOTRISONE) 1-0.05 % cream, Apply topically 2 times daily., Disp: 30 g, Rfl: 1    Tirzepatide (MOUNJARO) 5 MG/0.5ML SOPN SC injection, Inject 0.5 mLs into the skin once a week, Disp: 4 Adjustable Dose Pre-filled Pen Syringe, Rfl: 1    predniSONE (DELTASONE) 10 MG tablet, 3 qd x5days then 2 q dx 5days then 1 qd x5days, Disp: 30 tablet, Rfl: 0    doxycycline hyclate (VIBRAMYCIN) 100 MG capsule, Take 1 capsule by mouth 2 times daily for 10 days, Disp: 20 capsule, Rfl: 0

## 2024-07-23 ENCOUNTER — OFFICE VISIT (OUTPATIENT)
Dept: PRIMARY CARE CLINIC | Age: 59
End: 2024-07-23
Payer: COMMERCIAL

## 2024-07-23 VITALS
HEIGHT: 63 IN | SYSTOLIC BLOOD PRESSURE: 120 MMHG | TEMPERATURE: 97.7 F | OXYGEN SATURATION: 97 % | HEART RATE: 76 BPM | DIASTOLIC BLOOD PRESSURE: 80 MMHG | WEIGHT: 222 LBS | BODY MASS INDEX: 39.34 KG/M2

## 2024-07-23 DIAGNOSIS — F07.81 POST CONCUSSION SYNDROME: ICD-10-CM

## 2024-07-23 DIAGNOSIS — E11.9 TYPE 2 DIABETES MELLITUS WITHOUT COMPLICATION, WITHOUT LONG-TERM CURRENT USE OF INSULIN (HCC): ICD-10-CM

## 2024-07-23 DIAGNOSIS — I10 ESSENTIAL HYPERTENSION: ICD-10-CM

## 2024-07-23 DIAGNOSIS — L30.9 DERMATITIS: Primary | ICD-10-CM

## 2024-07-23 PROCEDURE — G8417 CALC BMI ABV UP PARAM F/U: HCPCS | Performed by: FAMILY MEDICINE

## 2024-07-23 PROCEDURE — 3017F COLORECTAL CA SCREEN DOC REV: CPT | Performed by: FAMILY MEDICINE

## 2024-07-23 PROCEDURE — 2022F DILAT RTA XM EVC RTNOPTHY: CPT | Performed by: FAMILY MEDICINE

## 2024-07-23 PROCEDURE — 3079F DIAST BP 80-89 MM HG: CPT | Performed by: FAMILY MEDICINE

## 2024-07-23 PROCEDURE — 3074F SYST BP LT 130 MM HG: CPT | Performed by: FAMILY MEDICINE

## 2024-07-23 PROCEDURE — 99214 OFFICE O/P EST MOD 30 MIN: CPT | Performed by: FAMILY MEDICINE

## 2024-07-23 PROCEDURE — G8427 DOCREV CUR MEDS BY ELIG CLIN: HCPCS | Performed by: FAMILY MEDICINE

## 2024-07-23 PROCEDURE — 3051F HG A1C>EQUAL 7.0%<8.0%: CPT | Performed by: FAMILY MEDICINE

## 2024-07-23 PROCEDURE — 1036F TOBACCO NON-USER: CPT | Performed by: FAMILY MEDICINE

## 2024-07-23 ASSESSMENT — ENCOUNTER SYMPTOMS
RESPIRATORY NEGATIVE: 1
GASTROINTESTINAL NEGATIVE: 1
ALLERGIC/IMMUNOLOGIC NEGATIVE: 1
EYES NEGATIVE: 1

## 2024-07-23 NOTE — PROGRESS NOTES
Neurotrophin PMG, Disp: , Rfl:     Omega-3 Fatty Acids (OMEGA-3 FISH OIL PO), Take by mouth, Disp: , Rfl:     TURMERIC PO, Take by mouth, Disp: , Rfl:     NONFORMULARY, Cataplex G, Disp: , Rfl:     NONFORMULARY, Asura Brain, Disp: , Rfl:     acetaminophen (TYLENOL) 325 MG tablet, Take 2 tablets by mouth every 6 hours as needed for Pain, Disp: , Rfl:     Melatonin 10 MG TABS, Take by mouth, Disp: , Rfl:     No Known Allergies    Social History     Tobacco Use    Smoking status: Never    Smokeless tobacco: Never   Vaping Use    Vaping Use: Never used   Substance Use Topics    Alcohol use: Not Currently    Drug use: Not Currently      Past Surgical History:   Procedure Laterality Date    TONSILLECTOMY      at age 5     Family History   Problem Relation Age of Onset    Diabetes Father     Heart Disease Father      Past Medical History:   Diagnosis Date    Depression     Diabetes mellitus (HCC)     Gastritis     Hyperlipidemia        Vitals:    07/23/24 0803   BP: 120/80   Pulse: 76   Temp: 97.7 °F (36.5 °C)   SpO2: 97%   Weight: 100.7 kg (222 lb)   Height: 1.6 m (5' 3\")     BP Readings from Last 3 Encounters:   07/23/24 120/80   06/24/24 128/78   06/17/24 138/80        Physical Exam  Vitals and nursing note reviewed.   Constitutional:       Appearance: She is well-developed.   HENT:      Head: Normocephalic.      Right Ear: External ear normal.      Left Ear: External ear normal.      Nose: Nose normal.   Eyes:      Conjunctiva/sclera: Conjunctivae normal.      Pupils: Pupils are equal, round, and reactive to light.   Cardiovascular:      Rate and Rhythm: Normal rate.   Pulmonary:      Breath sounds: Normal breath sounds.   Abdominal:      General: Bowel sounds are normal.      Palpations: Abdomen is soft.   Musculoskeletal:         General: Normal range of motion.      Cervical back: Normal range of motion and neck supple.   Skin:     General: Skin is warm and dry.   Neurological:      Mental Status: She is alert and

## 2024-08-08 ENCOUNTER — OFFICE VISIT (OUTPATIENT)
Dept: PRIMARY CARE CLINIC | Age: 59
End: 2024-08-08
Payer: COMMERCIAL

## 2024-08-08 VITALS
BODY MASS INDEX: 38.62 KG/M2 | DIASTOLIC BLOOD PRESSURE: 80 MMHG | TEMPERATURE: 98.2 F | SYSTOLIC BLOOD PRESSURE: 130 MMHG | WEIGHT: 218 LBS | HEART RATE: 90 BPM | OXYGEN SATURATION: 97 % | HEIGHT: 63 IN

## 2024-08-08 DIAGNOSIS — Z86.79 HISTORY OF SUBARACHNOID HEMORRHAGE: ICD-10-CM

## 2024-08-08 DIAGNOSIS — F07.81 POST CONCUSSION SYNDROME: Primary | ICD-10-CM

## 2024-08-08 DIAGNOSIS — E11.9 TYPE 2 DIABETES MELLITUS WITHOUT COMPLICATION, WITHOUT LONG-TERM CURRENT USE OF INSULIN (HCC): ICD-10-CM

## 2024-08-08 DIAGNOSIS — I10 ESSENTIAL HYPERTENSION: ICD-10-CM

## 2024-08-08 DIAGNOSIS — R53.83 OTHER FATIGUE: ICD-10-CM

## 2024-08-08 PROCEDURE — 3079F DIAST BP 80-89 MM HG: CPT | Performed by: FAMILY MEDICINE

## 2024-08-08 PROCEDURE — 1036F TOBACCO NON-USER: CPT | Performed by: FAMILY MEDICINE

## 2024-08-08 PROCEDURE — 3075F SYST BP GE 130 - 139MM HG: CPT | Performed by: FAMILY MEDICINE

## 2024-08-08 PROCEDURE — G8427 DOCREV CUR MEDS BY ELIG CLIN: HCPCS | Performed by: FAMILY MEDICINE

## 2024-08-08 PROCEDURE — 99214 OFFICE O/P EST MOD 30 MIN: CPT | Performed by: FAMILY MEDICINE

## 2024-08-08 PROCEDURE — G8417 CALC BMI ABV UP PARAM F/U: HCPCS | Performed by: FAMILY MEDICINE

## 2024-08-08 PROCEDURE — 3051F HG A1C>EQUAL 7.0%<8.0%: CPT | Performed by: FAMILY MEDICINE

## 2024-08-08 PROCEDURE — 2022F DILAT RTA XM EVC RTNOPTHY: CPT | Performed by: FAMILY MEDICINE

## 2024-08-08 PROCEDURE — 3017F COLORECTAL CA SCREEN DOC REV: CPT | Performed by: FAMILY MEDICINE

## 2024-08-08 NOTE — PROGRESS NOTES
Pain, Disp: , Rfl:     Melatonin 10 MG TABS, Take by mouth, Disp: , Rfl:     No Known Allergies    Social History     Tobacco Use    Smoking status: Never    Smokeless tobacco: Never   Vaping Use    Vaping Use: Never used   Substance Use Topics    Alcohol use: Not Currently    Drug use: Not Currently      Past Surgical History:   Procedure Laterality Date    TONSILLECTOMY      at age 5     Family History   Problem Relation Age of Onset    Diabetes Father     Heart Disease Father      Past Medical History:   Diagnosis Date    Depression     Diabetes mellitus (HCC)     Gastritis     Hyperlipidemia        Vitals:    08/08/24 1417   BP: 130/80   Pulse: 90   Temp: 98.2 °F (36.8 °C)   SpO2: 97%   Weight: 98.9 kg (218 lb)   Height: 1.6 m (5' 3\")     BP Readings from Last 3 Encounters:   08/08/24 130/80   07/23/24 120/80   06/24/24 128/78        Physical Exam  Vitals and nursing note reviewed.   Constitutional:       Appearance: She is well-developed.   HENT:      Head: Normocephalic.      Right Ear: External ear normal.      Left Ear: External ear normal.      Nose: Nose normal.   Eyes:      Conjunctiva/sclera: Conjunctivae normal.      Pupils: Pupils are equal, round, and reactive to light.   Cardiovascular:      Rate and Rhythm: Normal rate.   Pulmonary:      Breath sounds: Normal breath sounds.   Abdominal:      General: Bowel sounds are normal.      Palpations: Abdomen is soft.   Musculoskeletal:         General: Normal range of motion.      Cervical back: Normal range of motion and neck supple.   Skin:     General: Skin is warm and dry.   Neurological:      Mental Status: She is alert and oriented to person, place, and time.   Psychiatric:         Behavior: Behavior normal.        Today's vitals physical exam stable.  Heart regular lungs clear.  Medications as prescribed.  Follow-up visit with me 1 month sooner if problems and further adjustments at that time.          Plan Per Assessment:  Francine was seen today for

## 2024-08-24 LAB
ALBUMIN: 4.7 G/DL
ALP BLD-CCNC: 94 U/L
ALT SERPL-CCNC: 56 U/L
ANION GAP SERPL CALCULATED.3IONS-SCNC: ABNORMAL MMOL/L
AST SERPL-CCNC: 36 U/L
BASOPHILS ABSOLUTE: 0.1 /ΜL
BASOPHILS RELATIVE PERCENT: 1 %
BILIRUB SERPL-MCNC: 0.3 MG/DL (ref 0.1–1.4)
BUN BLDV-MCNC: 14 MG/DL
CALCIUM SERPL-MCNC: 9.6 MG/DL
CHLORIDE BLD-SCNC: 103 MMOL/L
CHOLESTEROL, TOTAL: 146 MG/DL
CHOLESTEROL/HDL RATIO: NORMAL
CO2: 22 MMOL/L
CREAT SERPL-MCNC: 1.21 MG/DL
EOSINOPHILS ABSOLUTE: 1.2 /ΜL
EOSINOPHILS RELATIVE PERCENT: 18 %
ESTIMATED AVERAGE GLUCOSE: NORMAL
GFR, ESTIMATED: 52
GLUCOSE BLD-MCNC: 127 MG/DL
HBA1C MFR BLD: 6.4 %
HCT VFR BLD CALC: 42.4 % (ref 36–46)
HDLC SERPL-MCNC: 44 MG/DL (ref 35–70)
HEMOGLOBIN: 13.9 G/DL (ref 12–16)
LDL CHOLESTEROL: 78
LYMPHOCYTES ABSOLUTE: 1.3 /ΜL
LYMPHOCYTES RELATIVE PERCENT: 20 %
MCH RBC QN AUTO: 30.8 PG
MCHC RBC AUTO-ENTMCNC: 32.8 G/DL
MCV RBC AUTO: 94 FL
MONOCYTES ABSOLUTE: 0.6 /ΜL
MONOCYTES RELATIVE PERCENT: 9 %
NEUTROPHILS ABSOLUTE: 3.4 /ΜL
NEUTROPHILS RELATIVE PERCENT: 52 %
NONHDLC SERPL-MCNC: NORMAL MG/DL
PDW BLD-RTO: 13.1 %
PLATELET # BLD: 376 K/ΜL
PMV BLD AUTO: NORMAL FL
POTASSIUM SERPL-SCNC: 4.7 MMOL/L
RBC # BLD: 4.51 10^6/ΜL
SODIUM BLD-SCNC: 141 MMOL/L
T4 TOTAL: 6.8
TOTAL PROTEIN: 7.2 G/DL (ref 6.4–8.2)
TRIGL SERPL-MCNC: 136 MG/DL
TSH SERPL DL<=0.05 MIU/L-ACNC: 1.15 UIU/ML
VLDLC SERPL CALC-MCNC: 24 MG/DL
WBC # BLD: 6.6 10^3/ML

## 2024-08-27 ENCOUNTER — OFFICE VISIT (OUTPATIENT)
Dept: PRIMARY CARE CLINIC | Age: 59
End: 2024-08-27
Payer: COMMERCIAL

## 2024-08-27 VITALS
HEIGHT: 63 IN | OXYGEN SATURATION: 98 % | BODY MASS INDEX: 38.8 KG/M2 | WEIGHT: 219 LBS | TEMPERATURE: 97.7 F | HEART RATE: 73 BPM | DIASTOLIC BLOOD PRESSURE: 70 MMHG | SYSTOLIC BLOOD PRESSURE: 128 MMHG

## 2024-08-27 DIAGNOSIS — E11.9 TYPE 2 DIABETES MELLITUS WITHOUT COMPLICATION, WITHOUT LONG-TERM CURRENT USE OF INSULIN (HCC): ICD-10-CM

## 2024-08-27 DIAGNOSIS — F41.9 ANXIETY: ICD-10-CM

## 2024-08-27 DIAGNOSIS — E78.00 PURE HYPERCHOLESTEROLEMIA: ICD-10-CM

## 2024-08-27 DIAGNOSIS — I10 ESSENTIAL HYPERTENSION: ICD-10-CM

## 2024-08-27 DIAGNOSIS — I10 ESSENTIAL HYPERTENSION: Primary | ICD-10-CM

## 2024-08-27 DIAGNOSIS — R74.8 ELEVATED LIVER ENZYMES: ICD-10-CM

## 2024-08-27 DIAGNOSIS — Z86.79 HISTORY OF SUBARACHNOID HEMORRHAGE: ICD-10-CM

## 2024-08-27 PROCEDURE — 3074F SYST BP LT 130 MM HG: CPT | Performed by: FAMILY MEDICINE

## 2024-08-27 PROCEDURE — 1036F TOBACCO NON-USER: CPT | Performed by: FAMILY MEDICINE

## 2024-08-27 PROCEDURE — 99214 OFFICE O/P EST MOD 30 MIN: CPT | Performed by: FAMILY MEDICINE

## 2024-08-27 PROCEDURE — 2022F DILAT RTA XM EVC RTNOPTHY: CPT | Performed by: FAMILY MEDICINE

## 2024-08-27 PROCEDURE — 3078F DIAST BP <80 MM HG: CPT | Performed by: FAMILY MEDICINE

## 2024-08-27 PROCEDURE — 3044F HG A1C LEVEL LT 7.0%: CPT | Performed by: FAMILY MEDICINE

## 2024-08-27 PROCEDURE — 3017F COLORECTAL CA SCREEN DOC REV: CPT | Performed by: FAMILY MEDICINE

## 2024-08-27 PROCEDURE — G8427 DOCREV CUR MEDS BY ELIG CLIN: HCPCS | Performed by: FAMILY MEDICINE

## 2024-08-27 PROCEDURE — G8417 CALC BMI ABV UP PARAM F/U: HCPCS | Performed by: FAMILY MEDICINE

## 2024-08-27 NOTE — PROGRESS NOTES
needed for Pain, Disp: , Rfl:     Melatonin 10 MG TABS, Take by mouth, Disp: , Rfl:     No Known Allergies    Social History     Tobacco Use    Smoking status: Never    Smokeless tobacco: Never   Vaping Use    Vaping status: Never Used   Substance Use Topics    Alcohol use: Not Currently    Drug use: Not Currently      Past Surgical History:   Procedure Laterality Date    TONSILLECTOMY      at age 5     Family History   Problem Relation Age of Onset    Diabetes Father     Heart Disease Father      Past Medical History:   Diagnosis Date    Depression     Diabetes mellitus (HCC)     Gastritis     Hyperlipidemia        Vitals:    08/27/24 0754   BP: 128/70   Pulse: 73   Temp: 97.7 °F (36.5 °C)   SpO2: 98%   Weight: 99.3 kg (219 lb)   Height: 1.6 m (5' 3\")     BP Readings from Last 3 Encounters:   08/27/24 128/70   08/08/24 130/80   07/23/24 120/80    120/78    Physical Exam  Vitals and nursing note reviewed.   Constitutional:       Appearance: She is well-developed.   HENT:      Head: Normocephalic.      Right Ear: External ear normal.      Left Ear: External ear normal.      Nose: Nose normal.   Eyes:      Conjunctiva/sclera: Conjunctivae normal.      Pupils: Pupils are equal, round, and reactive to light.   Cardiovascular:      Rate and Rhythm: Normal rate.   Pulmonary:      Breath sounds: Normal breath sounds.   Abdominal:      General: Bowel sounds are normal.      Palpations: Abdomen is soft.   Musculoskeletal:         General: Normal range of motion.      Cervical back: Normal range of motion and neck supple.   Skin:     General: Skin is warm and dry.   Neurological:      Mental Status: She is alert and oriented to person, place, and time.   Psychiatric:         Behavior: Behavior normal.     Today's vitals physical exam stable.  Heart regular lungs clear.  Medications as prescribed.  Follow-up visit with me 6 weeks sooner if problems.  Labs reviewed and stable.  Lab Results   Component Value Date    TSH 1.150

## 2024-09-13 ENCOUNTER — OFFICE VISIT (OUTPATIENT)
Dept: FAMILY MEDICINE CLINIC | Age: 59
End: 2024-09-13
Payer: COMMERCIAL

## 2024-09-13 VITALS
TEMPERATURE: 97.6 F | SYSTOLIC BLOOD PRESSURE: 134 MMHG | HEART RATE: 83 BPM | OXYGEN SATURATION: 97 % | BODY MASS INDEX: 38.26 KG/M2 | WEIGHT: 216 LBS | DIASTOLIC BLOOD PRESSURE: 82 MMHG

## 2024-09-13 DIAGNOSIS — J06.9 ACUTE UPPER RESPIRATORY INFECTION, UNSPECIFIED: Primary | ICD-10-CM

## 2024-09-13 DIAGNOSIS — J01.90 ACUTE NON-RECURRENT SINUSITIS, UNSPECIFIED LOCATION: ICD-10-CM

## 2024-09-13 PROCEDURE — 3017F COLORECTAL CA SCREEN DOC REV: CPT | Performed by: PHYSICIAN ASSISTANT

## 2024-09-13 PROCEDURE — 3075F SYST BP GE 130 - 139MM HG: CPT | Performed by: PHYSICIAN ASSISTANT

## 2024-09-13 PROCEDURE — 99213 OFFICE O/P EST LOW 20 MIN: CPT | Performed by: PHYSICIAN ASSISTANT

## 2024-09-13 PROCEDURE — 1036F TOBACCO NON-USER: CPT | Performed by: PHYSICIAN ASSISTANT

## 2024-09-13 PROCEDURE — 3079F DIAST BP 80-89 MM HG: CPT | Performed by: PHYSICIAN ASSISTANT

## 2024-09-13 PROCEDURE — G8427 DOCREV CUR MEDS BY ELIG CLIN: HCPCS | Performed by: PHYSICIAN ASSISTANT

## 2024-09-13 PROCEDURE — G8417 CALC BMI ABV UP PARAM F/U: HCPCS | Performed by: PHYSICIAN ASSISTANT

## 2024-09-13 RX ORDER — PREDNISONE 10 MG/1
TABLET ORAL
Qty: 18 TABLET | Refills: 0 | Status: SHIPPED | OUTPATIENT
Start: 2024-09-13

## 2024-09-13 RX ORDER — DOXYCYCLINE HYCLATE 100 MG
100 TABLET ORAL 2 TIMES DAILY
Qty: 20 TABLET | Refills: 0 | Status: SHIPPED | OUTPATIENT
Start: 2024-09-13 | End: 2024-09-23

## 2024-09-13 RX ORDER — BENZONATATE 100 MG/1
100 CAPSULE ORAL 3 TIMES DAILY PRN
Qty: 21 CAPSULE | Refills: 0 | Status: SHIPPED | OUTPATIENT
Start: 2024-09-13 | End: 2024-09-20

## 2024-09-30 ENCOUNTER — OFFICE VISIT (OUTPATIENT)
Dept: FAMILY MEDICINE CLINIC | Age: 59
End: 2024-09-30
Payer: COMMERCIAL

## 2024-09-30 VITALS
OXYGEN SATURATION: 98 % | BODY MASS INDEX: 38.26 KG/M2 | WEIGHT: 216 LBS | SYSTOLIC BLOOD PRESSURE: 144 MMHG | DIASTOLIC BLOOD PRESSURE: 90 MMHG | TEMPERATURE: 98 F | HEART RATE: 89 BPM

## 2024-09-30 DIAGNOSIS — J02.0 STREP PHARYNGITIS: Primary | ICD-10-CM

## 2024-09-30 DIAGNOSIS — J02.9 SORE THROAT: ICD-10-CM

## 2024-09-30 LAB — S PYO AG THROAT QL: POSITIVE

## 2024-09-30 PROCEDURE — G8427 DOCREV CUR MEDS BY ELIG CLIN: HCPCS | Performed by: PHYSICIAN ASSISTANT

## 2024-09-30 PROCEDURE — 1036F TOBACCO NON-USER: CPT | Performed by: PHYSICIAN ASSISTANT

## 2024-09-30 PROCEDURE — 3017F COLORECTAL CA SCREEN DOC REV: CPT | Performed by: PHYSICIAN ASSISTANT

## 2024-09-30 PROCEDURE — 99213 OFFICE O/P EST LOW 20 MIN: CPT | Performed by: PHYSICIAN ASSISTANT

## 2024-09-30 PROCEDURE — 87880 STREP A ASSAY W/OPTIC: CPT | Performed by: PHYSICIAN ASSISTANT

## 2024-09-30 PROCEDURE — 3077F SYST BP >= 140 MM HG: CPT | Performed by: PHYSICIAN ASSISTANT

## 2024-09-30 PROCEDURE — G8417 CALC BMI ABV UP PARAM F/U: HCPCS | Performed by: PHYSICIAN ASSISTANT

## 2024-09-30 PROCEDURE — 3080F DIAST BP >= 90 MM HG: CPT | Performed by: PHYSICIAN ASSISTANT

## 2024-09-30 RX ORDER — AMOXICILLIN 875 MG
875 TABLET ORAL 2 TIMES DAILY
Qty: 20 TABLET | Refills: 0 | Status: SHIPPED | OUTPATIENT
Start: 2024-09-30 | End: 2024-10-10

## 2024-09-30 NOTE — PROGRESS NOTES
24  Francine Sesay : 1965 Sex: female  Age 58 y.o.      Subjective:  Chief Complaint   Patient presents with    Pharyngitis    Headache    Otalgia     left         HPI:   HPI  Francine Sesay , 58 y.o. female presents to [facility] care for evaluation of left ear pain, headache, sore throat.    The patient started with the symptoms over the last couple of days.  The patient has had sore throat, congestion, drainage, and left ear pain.  The patient is mostly complaining of a sore throat.  Recently exposed to grandson who had tested positive for strep.  The patient is not any chest pain, shortness of breath.  No real cough.  The patient is not having fevers.      ROS:   Unless otherwise stated in this report the patient's positive and negative responses for review of systems for constitutional, eyes, ENT, cardiovascular, respiratory, gastrointestinal, neurological, , musculoskeletal, and integument systems and related systems to the presenting problem are either stated in the history of present illness or were not pertinent or were negative for the symptoms and/or complaints related to the presenting medical problem.  Positives and pertinent negatives as per HPI.  All others reviewed and are negative.      PMH:     Past Medical History:   Diagnosis Date    Depression     Diabetes mellitus (HCC)     Gastritis     Hyperlipidemia        Past Surgical History:   Procedure Laterality Date    TONSILLECTOMY      at age 5       Family History   Problem Relation Age of Onset    Diabetes Father     Heart Disease Father        Medications:     Current Outpatient Medications:     amoxicillin (AMOXIL) 875 MG tablet, Take 1 tablet by mouth 2 times daily for 10 days, Disp: 20 tablet, Rfl: 0    clotrimazole-betamethasone (LOTRISONE) 1-0.05 % cream, Apply topically 2 times daily., Disp: 30 g, Rfl: 1    busPIRone (BUSPAR) 5 MG tablet, Take 1 tablet by mouth 2 times daily, Disp: 180 tablet, Rfl: 3    venlafaxine (EFFEXOR XR)

## 2024-10-15 ENCOUNTER — OFFICE VISIT (OUTPATIENT)
Dept: PRIMARY CARE CLINIC | Age: 59
End: 2024-10-15
Payer: COMMERCIAL

## 2024-10-15 VITALS
HEART RATE: 100 BPM | WEIGHT: 219 LBS | BODY MASS INDEX: 38.8 KG/M2 | DIASTOLIC BLOOD PRESSURE: 80 MMHG | SYSTOLIC BLOOD PRESSURE: 130 MMHG | HEIGHT: 63 IN | OXYGEN SATURATION: 97 % | TEMPERATURE: 97.9 F

## 2024-10-15 DIAGNOSIS — F41.9 ANXIETY: ICD-10-CM

## 2024-10-15 DIAGNOSIS — E78.00 PURE HYPERCHOLESTEROLEMIA: ICD-10-CM

## 2024-10-15 DIAGNOSIS — I10 ESSENTIAL HYPERTENSION: Primary | ICD-10-CM

## 2024-10-15 DIAGNOSIS — E11.9 TYPE 2 DIABETES MELLITUS WITHOUT COMPLICATION, WITHOUT LONG-TERM CURRENT USE OF INSULIN (HCC): ICD-10-CM

## 2024-10-15 DIAGNOSIS — Z86.79 HISTORY OF SUBARACHNOID HEMORRHAGE: ICD-10-CM

## 2024-10-15 PROCEDURE — 2022F DILAT RTA XM EVC RTNOPTHY: CPT | Performed by: FAMILY MEDICINE

## 2024-10-15 PROCEDURE — G8417 CALC BMI ABV UP PARAM F/U: HCPCS | Performed by: FAMILY MEDICINE

## 2024-10-15 PROCEDURE — 1036F TOBACCO NON-USER: CPT | Performed by: FAMILY MEDICINE

## 2024-10-15 PROCEDURE — 99214 OFFICE O/P EST MOD 30 MIN: CPT | Performed by: FAMILY MEDICINE

## 2024-10-15 PROCEDURE — G8484 FLU IMMUNIZE NO ADMIN: HCPCS | Performed by: FAMILY MEDICINE

## 2024-10-15 PROCEDURE — 3079F DIAST BP 80-89 MM HG: CPT | Performed by: FAMILY MEDICINE

## 2024-10-15 PROCEDURE — G8427 DOCREV CUR MEDS BY ELIG CLIN: HCPCS | Performed by: FAMILY MEDICINE

## 2024-10-15 PROCEDURE — 3017F COLORECTAL CA SCREEN DOC REV: CPT | Performed by: FAMILY MEDICINE

## 2024-10-15 PROCEDURE — 3044F HG A1C LEVEL LT 7.0%: CPT | Performed by: FAMILY MEDICINE

## 2024-10-15 PROCEDURE — 3075F SYST BP GE 130 - 139MM HG: CPT | Performed by: FAMILY MEDICINE

## 2024-10-15 RX ORDER — ROSUVASTATIN CALCIUM 5 MG/1
5 TABLET, COATED ORAL DAILY
Qty: 90 TABLET | Refills: 3 | Status: SHIPPED | OUTPATIENT
Start: 2024-10-15

## 2024-10-15 RX ORDER — PANTOPRAZOLE SODIUM 40 MG/1
40 TABLET, DELAYED RELEASE ORAL DAILY
Qty: 90 TABLET | Refills: 3 | Status: SHIPPED | OUTPATIENT
Start: 2024-10-15

## 2024-10-15 RX ORDER — METFORMIN HCL 500 MG
TABLET, EXTENDED RELEASE 24 HR ORAL
Qty: 360 TABLET | Refills: 3 | Status: SHIPPED | OUTPATIENT
Start: 2024-10-15

## 2024-10-15 RX ORDER — VENLAFAXINE HYDROCHLORIDE 75 MG/1
75 CAPSULE, EXTENDED RELEASE ORAL DAILY
Qty: 90 CAPSULE | Refills: 2 | Status: SHIPPED | OUTPATIENT
Start: 2024-10-15

## 2024-10-15 NOTE — PROGRESS NOTES
10/15/24     Francine Sesay    : 1965 Sex: female   Age: 58 y.o.      Chief Complaint   Patient presents with    Hypertension       Prior to Admission medications    Medication Sig Start Date End Date Taking? Authorizing Provider   amitriptyline (ELAVIL) 25 MG tablet 1-2  po nightly 10/15/24  Yes Yunior Oates DO   metFORMIN (GLUCOPHAGE-XR) 500 MG extended release tablet TAKE 2 TABLETS IN THE MORNING AND 2 TABLETS AT DINNER 10/15/24  Yes Yunior Oates DO   pantoprazole (PROTONIX) 40 MG tablet Take 1 tablet by mouth daily 10/15/24  Yes Yunior Oates DO   rosuvastatin (CRESTOR) 5 MG tablet Take 1 tablet by mouth daily 10/15/24  Yes Yunior Oates DO   venlafaxine (EFFEXOR XR) 75 MG extended release capsule Take 1 capsule by mouth daily 10/15/24  Yes Yunior Oates DO   Semaglutide,0.25 or 0.5MG/DOS, 2 MG/3ML SOPN 0.25mg q week 10/15/24  Yes Yunior Oates DO   clotrimazole-betamethasone (LOTRISONE) 1-0.05 % cream Apply topically 2 times daily. 24   Yunior Oates DO   busPIRone (BUSPAR) 5 MG tablet Take 1 tablet by mouth 2 times daily 24   Yunior Oates DO   blood glucose monitor strips Test one times a day 3/27/24   Yunior Oates DO   Lancets MISC 1 each by Does not apply route daily 23   Yunior Oates DO   NONFORMULARY Neurotrophin PMG    Tu Juan MD   Omega-3 Fatty Acids (OMEGA-3 FISH OIL PO) Take by mouth    Tu Juan MD   TURMERIC PO Take by mouth    Tu Juan MD NONFORMATUL Cataplex G    Tu Juan MD NONFORMULARY Asura Brain    ProviderTu MD   acetaminophen (TYLENOL) 325 MG tablet Take 2 tablets by mouth every 6 hours as needed for Pain    Tu Juan MD   Melatonin 10 MG TABS Take by mouth    ProviderTu MD          HPI: Patient evaluated today with hypertension history of subarachnoid hemorrhage diabetes hyperlipidemia and anxiety.  Medically overall

## 2024-10-25 ENCOUNTER — OFFICE VISIT (OUTPATIENT)
Dept: FAMILY MEDICINE CLINIC | Age: 59
End: 2024-10-25
Payer: COMMERCIAL

## 2024-10-25 VITALS
DIASTOLIC BLOOD PRESSURE: 78 MMHG | OXYGEN SATURATION: 98 % | WEIGHT: 219 LBS | HEART RATE: 80 BPM | SYSTOLIC BLOOD PRESSURE: 126 MMHG | BODY MASS INDEX: 38.79 KG/M2 | TEMPERATURE: 97.3 F

## 2024-10-25 DIAGNOSIS — J02.9 SORE THROAT: Primary | ICD-10-CM

## 2024-10-25 DIAGNOSIS — J02.0 ACUTE STREPTOCOCCAL PHARYNGITIS: ICD-10-CM

## 2024-10-25 LAB — S PYO AG THROAT QL: POSITIVE

## 2024-10-25 PROCEDURE — 1036F TOBACCO NON-USER: CPT | Performed by: FAMILY MEDICINE

## 2024-10-25 PROCEDURE — G8417 CALC BMI ABV UP PARAM F/U: HCPCS | Performed by: FAMILY MEDICINE

## 2024-10-25 PROCEDURE — G8484 FLU IMMUNIZE NO ADMIN: HCPCS | Performed by: FAMILY MEDICINE

## 2024-10-25 PROCEDURE — 3078F DIAST BP <80 MM HG: CPT | Performed by: FAMILY MEDICINE

## 2024-10-25 PROCEDURE — 99213 OFFICE O/P EST LOW 20 MIN: CPT | Performed by: FAMILY MEDICINE

## 2024-10-25 PROCEDURE — G8427 DOCREV CUR MEDS BY ELIG CLIN: HCPCS | Performed by: FAMILY MEDICINE

## 2024-10-25 PROCEDURE — 3017F COLORECTAL CA SCREEN DOC REV: CPT | Performed by: FAMILY MEDICINE

## 2024-10-25 PROCEDURE — 87880 STREP A ASSAY W/OPTIC: CPT | Performed by: FAMILY MEDICINE

## 2024-10-25 PROCEDURE — 3074F SYST BP LT 130 MM HG: CPT | Performed by: FAMILY MEDICINE

## 2024-10-25 RX ORDER — CEFDINIR 300 MG/1
300 CAPSULE ORAL 2 TIMES DAILY
Qty: 14 CAPSULE | Refills: 0 | Status: SHIPPED | OUTPATIENT
Start: 2024-10-25 | End: 2024-11-01

## 2024-10-25 RX ORDER — LIDOCAINE HYDROCHLORIDE 20 MG/ML
10 SOLUTION OROPHARYNGEAL
Qty: 100 ML | Refills: 0 | Status: SHIPPED | OUTPATIENT
Start: 2024-10-25

## 2024-10-25 RX ORDER — FLUCONAZOLE 150 MG/1
150 TABLET ORAL
Qty: 2 TABLET | Refills: 0 | Status: SHIPPED | OUTPATIENT
Start: 2024-10-25 | End: 2024-10-31

## 2024-10-25 ASSESSMENT — ENCOUNTER SYMPTOMS
SORE THROAT: 1
EYES NEGATIVE: 1
GASTROINTESTINAL NEGATIVE: 1
TROUBLE SWALLOWING: 0
RESPIRATORY NEGATIVE: 1

## 2024-10-25 NOTE — PROGRESS NOTES
vaccine  Aged Out    Polio vaccine  Aged Out    Meningococcal (ACWY) vaccine  Aged Out      There are no preventive care reminders to display for this patient.   There are no preventive care reminders to display for this patient.     /78   Pulse 80   Temp 97.3 °F (36.3 °C) (Temporal)   Wt 99.3 kg (219 lb)   SpO2 98%   BMI 38.79 kg/m²     Objective   Physical Exam  Vitals reviewed.   Constitutional:       Appearance: She is well-developed. She is ill-appearing.   HENT:      Head: Normocephalic and atraumatic.      Right Ear: Hearing normal. A middle ear effusion is present. Tympanic membrane is bulging.      Left Ear: Hearing normal. A middle ear effusion is present. Tympanic membrane is bulging.      Nose: Nose normal.      Mouth/Throat:      Dentition: Normal dentition.      Pharynx: Posterior oropharyngeal erythema present. No oropharyngeal exudate.      Tonsils: No tonsillar exudate.   Eyes:      Conjunctiva/sclera: Conjunctivae normal.      Pupils: Pupils are equal, round, and reactive to light.   Cardiovascular:      Rate and Rhythm: Normal rate and regular rhythm.      Heart sounds: Normal heart sounds. No murmur heard.  Pulmonary:      Effort: Pulmonary effort is normal. No respiratory distress.      Breath sounds: Normal breath sounds. No wheezing.   Abdominal:      General: Bowel sounds are normal. There is no distension.      Palpations: Abdomen is soft.   Musculoskeletal:      Cervical back: Normal range of motion and neck supple.   Lymphadenopathy:      Cervical: No cervical adenopathy.   Skin:     General: Skin is warm and dry.      Findings: No rash.   Neurological:      Mental Status: She is alert.   Psychiatric:         Behavior: Behavior normal.                  An electronic signature was used to authenticate this note.    --Scottie Guthrie, DO

## 2024-11-26 ENCOUNTER — OFFICE VISIT (OUTPATIENT)
Dept: PRIMARY CARE CLINIC | Age: 59
End: 2024-11-26
Payer: COMMERCIAL

## 2024-11-26 VITALS
DIASTOLIC BLOOD PRESSURE: 80 MMHG | SYSTOLIC BLOOD PRESSURE: 126 MMHG | WEIGHT: 221 LBS | HEIGHT: 63 IN | HEART RATE: 90 BPM | TEMPERATURE: 97.8 F | BODY MASS INDEX: 39.16 KG/M2 | OXYGEN SATURATION: 97 %

## 2024-11-26 DIAGNOSIS — E78.00 PURE HYPERCHOLESTEROLEMIA: ICD-10-CM

## 2024-11-26 DIAGNOSIS — I10 ESSENTIAL HYPERTENSION: Primary | ICD-10-CM

## 2024-11-26 DIAGNOSIS — E66.01 SEVERE OBESITY (BMI 35.0-39.9) WITH COMORBIDITY: ICD-10-CM

## 2024-11-26 DIAGNOSIS — E11.9 TYPE 2 DIABETES MELLITUS WITHOUT COMPLICATION, WITHOUT LONG-TERM CURRENT USE OF INSULIN (HCC): ICD-10-CM

## 2024-11-26 PROCEDURE — 2022F DILAT RTA XM EVC RTNOPTHY: CPT | Performed by: FAMILY MEDICINE

## 2024-11-26 PROCEDURE — 3017F COLORECTAL CA SCREEN DOC REV: CPT | Performed by: FAMILY MEDICINE

## 2024-11-26 PROCEDURE — 3044F HG A1C LEVEL LT 7.0%: CPT | Performed by: FAMILY MEDICINE

## 2024-11-26 PROCEDURE — G8484 FLU IMMUNIZE NO ADMIN: HCPCS | Performed by: FAMILY MEDICINE

## 2024-11-26 PROCEDURE — 3074F SYST BP LT 130 MM HG: CPT | Performed by: FAMILY MEDICINE

## 2024-11-26 PROCEDURE — 3079F DIAST BP 80-89 MM HG: CPT | Performed by: FAMILY MEDICINE

## 2024-11-26 PROCEDURE — G8417 CALC BMI ABV UP PARAM F/U: HCPCS | Performed by: FAMILY MEDICINE

## 2024-11-26 PROCEDURE — G8427 DOCREV CUR MEDS BY ELIG CLIN: HCPCS | Performed by: FAMILY MEDICINE

## 2024-11-26 PROCEDURE — 99214 OFFICE O/P EST MOD 30 MIN: CPT | Performed by: FAMILY MEDICINE

## 2024-11-26 PROCEDURE — 1036F TOBACCO NON-USER: CPT | Performed by: FAMILY MEDICINE

## 2024-11-26 NOTE — PROGRESS NOTES
24     Francine Sesay    : 1965 Sex: female   Age: 58 y.o.      Chief Complaint   Patient presents with    Discuss Medications    Weight Management       Prior to Admission medications    Medication Sig Start Date End Date Taking? Authorizing Provider   Semaglutide,0.25 or 0.5MG/DOS, 2 MG/3ML SOPN 0.5mg q week 24  Yes Yunior Oates DO   lidocaine viscous hcl (XYLOCAINE) 2 % SOLN solution Take 10 mLs by mouth every 3 hours as needed for Irritation 10/25/24   Scottie Guthrie, DO   amitriptyline (ELAVIL) 25 MG tablet 1-2  po nightly 10/15/24   Yunior Oates DO   metFORMIN (GLUCOPHAGE-XR) 500 MG extended release tablet TAKE 2 TABLETS IN THE MORNING AND 2 TABLETS AT DINNER 10/15/24   Yunior Oates DO   pantoprazole (PROTONIX) 40 MG tablet Take 1 tablet by mouth daily 10/15/24   Yunior Oates DO   rosuvastatin (CRESTOR) 5 MG tablet Take 1 tablet by mouth daily 10/15/24   Yunior Oates DO   venlafaxine (EFFEXOR XR) 75 MG extended release capsule Take 1 capsule by mouth daily 10/15/24   Yunior Oates DO   clotrimazole-betamethasone (LOTRISONE) 1-0.05 % cream Apply topically 2 times daily. 24   Yunior Oates DO   busPIRone (BUSPAR) 5 MG tablet Take 1 tablet by mouth 2 times daily 24   Yunior Oates DO   blood glucose monitor strips Test one times a day 3/27/24   Yunior Oates DO   Lancets MISC 1 each by Does not apply route daily 23   Yunior Oates DO   NONFORMULARY Neurotrophin PMG    Tu Juan MD   Omega-3 Fatty Acids (OMEGA-3 FISH OIL PO) Take by mouth    Tu Juan MD   TURMERIC PO Take by mouth    Tu Juan MD NONFORMATUL Cataplex G    Tu Juan MD NONFORMULARY Asura Brain    ProviderTu MD   acetaminophen (TYLENOL) 325 MG tablet Take 2 tablets by mouth every 6 hours as needed for Pain    Tu Juan MD   Melatonin 10 MG TABS Take by mouth    Provider,

## 2025-01-03 DIAGNOSIS — E66.01 SEVERE OBESITY (BMI 35.0-39.9) WITH COMORBIDITY: ICD-10-CM

## 2025-01-03 DIAGNOSIS — I10 ESSENTIAL HYPERTENSION: ICD-10-CM

## 2025-01-03 DIAGNOSIS — E11.9 TYPE 2 DIABETES MELLITUS WITHOUT COMPLICATION, WITHOUT LONG-TERM CURRENT USE OF INSULIN (HCC): ICD-10-CM

## 2025-01-03 LAB
ALBUMIN: 4.5 G/DL (ref 3.5–5.2)
ALP BLD-CCNC: 90 U/L (ref 35–104)
ALT SERPL-CCNC: 33 U/L (ref 0–32)
ANION GAP SERPL CALCULATED.3IONS-SCNC: 10 MMOL/L (ref 7–16)
AST SERPL-CCNC: 25 U/L (ref 0–31)
BASOPHILS ABSOLUTE: 0.07 K/UL (ref 0–0.2)
BASOPHILS RELATIVE PERCENT: 1 % (ref 0–2)
BILIRUB SERPL-MCNC: 0.5 MG/DL (ref 0–1.2)
BUN BLDV-MCNC: 12 MG/DL (ref 6–23)
CALCIUM SERPL-MCNC: 9.4 MG/DL (ref 8.6–10.2)
CHLORIDE BLD-SCNC: 104 MMOL/L (ref 98–107)
CHOLESTEROL, TOTAL: 125 MG/DL
CO2: 27 MMOL/L (ref 22–29)
CREAT SERPL-MCNC: 1 MG/DL (ref 0.5–1)
EOSINOPHILS ABSOLUTE: 0.36 K/UL (ref 0.05–0.5)
EOSINOPHILS RELATIVE PERCENT: 6 % (ref 0–6)
GFR, ESTIMATED: 65 ML/MIN/1.73M2
GLUCOSE BLD-MCNC: 102 MG/DL (ref 74–99)
HBA1C MFR BLD: 6 % (ref 4–5.6)
HCT VFR BLD CALC: 41.4 % (ref 34–48)
HDLC SERPL-MCNC: 38 MG/DL
HEMOGLOBIN: 13.6 G/DL (ref 11.5–15.5)
IMMATURE GRANULOCYTES %: 0 % (ref 0–5)
IMMATURE GRANULOCYTES ABSOLUTE: <0.03 K/UL (ref 0–0.58)
LDL CHOLESTEROL: 58 MG/DL
LYMPHOCYTES ABSOLUTE: 1.36 K/UL (ref 1.5–4)
LYMPHOCYTES RELATIVE PERCENT: 21 % (ref 20–42)
MCH RBC QN AUTO: 30.7 PG (ref 26–35)
MCHC RBC AUTO-ENTMCNC: 32.9 G/DL (ref 32–34.5)
MCV RBC AUTO: 93.5 FL (ref 80–99.9)
MONOCYTES ABSOLUTE: 0.65 K/UL (ref 0.1–0.95)
MONOCYTES RELATIVE PERCENT: 10 % (ref 2–12)
NEUTROPHILS ABSOLUTE: 4.04 K/UL (ref 1.8–7.3)
NEUTROPHILS RELATIVE PERCENT: 62 % (ref 43–80)
PDW BLD-RTO: 13.5 % (ref 11.5–15)
PLATELET # BLD: 362 K/UL (ref 130–450)
PMV BLD AUTO: 9.5 FL (ref 7–12)
POTASSIUM SERPL-SCNC: 4.5 MMOL/L (ref 3.5–5)
RBC # BLD: 4.43 M/UL (ref 3.5–5.5)
SODIUM BLD-SCNC: 141 MMOL/L (ref 132–146)
THYROXINE (T4): 6.5 UG/DL (ref 4.5–11.7)
TOTAL PROTEIN: 7.3 G/DL (ref 6.4–8.3)
TRIGL SERPL-MCNC: 146 MG/DL
TSH SERPL DL<=0.05 MIU/L-ACNC: 2.01 UIU/ML (ref 0.27–4.2)
VLDLC SERPL CALC-MCNC: 29 MG/DL
WBC # BLD: 6.5 K/UL (ref 4.5–11.5)

## 2025-01-07 ENCOUNTER — OFFICE VISIT (OUTPATIENT)
Dept: PRIMARY CARE CLINIC | Age: 60
End: 2025-01-07
Payer: COMMERCIAL

## 2025-01-07 VITALS
OXYGEN SATURATION: 96 % | TEMPERATURE: 98 F | HEART RATE: 100 BPM | BODY MASS INDEX: 38.62 KG/M2 | WEIGHT: 218 LBS | HEIGHT: 63 IN | SYSTOLIC BLOOD PRESSURE: 130 MMHG | DIASTOLIC BLOOD PRESSURE: 80 MMHG

## 2025-01-07 DIAGNOSIS — E11.9 TYPE 2 DIABETES MELLITUS WITHOUT COMPLICATION, WITHOUT LONG-TERM CURRENT USE OF INSULIN (HCC): ICD-10-CM

## 2025-01-07 DIAGNOSIS — E78.00 PURE HYPERCHOLESTEROLEMIA: ICD-10-CM

## 2025-01-07 DIAGNOSIS — K21.00 GASTROESOPHAGEAL REFLUX DISEASE WITH ESOPHAGITIS WITHOUT HEMORRHAGE: ICD-10-CM

## 2025-01-07 DIAGNOSIS — I10 ESSENTIAL HYPERTENSION: Primary | ICD-10-CM

## 2025-01-07 DIAGNOSIS — F41.9 ANXIETY: ICD-10-CM

## 2025-01-07 DIAGNOSIS — R13.10 SWALLOWING DYSFUNCTION: ICD-10-CM

## 2025-01-07 PROCEDURE — 3044F HG A1C LEVEL LT 7.0%: CPT | Performed by: FAMILY MEDICINE

## 2025-01-07 PROCEDURE — 3075F SYST BP GE 130 - 139MM HG: CPT | Performed by: FAMILY MEDICINE

## 2025-01-07 PROCEDURE — M1308 PR FLU IMMUNIZE NO ADMIN: HCPCS | Performed by: FAMILY MEDICINE

## 2025-01-07 PROCEDURE — G8427 DOCREV CUR MEDS BY ELIG CLIN: HCPCS | Performed by: FAMILY MEDICINE

## 2025-01-07 PROCEDURE — 3079F DIAST BP 80-89 MM HG: CPT | Performed by: FAMILY MEDICINE

## 2025-01-07 PROCEDURE — 2022F DILAT RTA XM EVC RTNOPTHY: CPT | Performed by: FAMILY MEDICINE

## 2025-01-07 PROCEDURE — 1036F TOBACCO NON-USER: CPT | Performed by: FAMILY MEDICINE

## 2025-01-07 PROCEDURE — G8417 CALC BMI ABV UP PARAM F/U: HCPCS | Performed by: FAMILY MEDICINE

## 2025-01-07 PROCEDURE — 99214 OFFICE O/P EST MOD 30 MIN: CPT | Performed by: FAMILY MEDICINE

## 2025-01-07 PROCEDURE — 3017F COLORECTAL CA SCREEN DOC REV: CPT | Performed by: FAMILY MEDICINE

## 2025-01-07 ASSESSMENT — PATIENT HEALTH QUESTIONNAIRE - PHQ9
1. LITTLE INTEREST OR PLEASURE IN DOING THINGS: NOT AT ALL
SUM OF ALL RESPONSES TO PHQ9 QUESTIONS 1 & 2: 3
SUM OF ALL RESPONSES TO PHQ QUESTIONS 1-9: 11
4. FEELING TIRED OR HAVING LITTLE ENERGY: NEARLY EVERY DAY
8. MOVING OR SPEAKING SO SLOWLY THAT OTHER PEOPLE COULD HAVE NOTICED. OR THE OPPOSITE, BEING SO FIGETY OR RESTLESS THAT YOU HAVE BEEN MOVING AROUND A LOT MORE THAN USUAL: SEVERAL DAYS
SUM OF ALL RESPONSES TO PHQ QUESTIONS 1-9: 11
6. FEELING BAD ABOUT YOURSELF - OR THAT YOU ARE A FAILURE OR HAVE LET YOURSELF OR YOUR FAMILY DOWN: MORE THAN HALF THE DAYS
2. FEELING DOWN, DEPRESSED OR HOPELESS: NEARLY EVERY DAY
SUM OF ALL RESPONSES TO PHQ QUESTIONS 1-9: 11
SUM OF ALL RESPONSES TO PHQ QUESTIONS 1-9: 11
7. TROUBLE CONCENTRATING ON THINGS, SUCH AS READING THE NEWSPAPER OR WATCHING TELEVISION: SEVERAL DAYS
9. THOUGHTS THAT YOU WOULD BE BETTER OFF DEAD, OR OF HURTING YOURSELF: NOT AT ALL
5. POOR APPETITE OR OVEREATING: NOT AT ALL
3. TROUBLE FALLING OR STAYING ASLEEP: SEVERAL DAYS
10. IF YOU CHECKED OFF ANY PROBLEMS, HOW DIFFICULT HAVE THESE PROBLEMS MADE IT FOR YOU TO DO YOUR WORK, TAKE CARE OF THINGS AT HOME, OR GET ALONG WITH OTHER PEOPLE: VERY DIFFICULT

## 2025-01-07 NOTE — PROGRESS NOTES
25     Francine Sesay    : 1965 Sex: female   Age: 59 y.o.      Chief Complaint   Patient presents with    Hypertension    Discuss Labs       Prior to Admission medications    Medication Sig Start Date End Date Taking? Authorizing Provider   Semaglutide,0.25 or 0.5MG/DOS, 2 MG/3ML SOPN 0.5mg q week 24  Yes Yunior Oates DO   lidocaine viscous hcl (XYLOCAINE) 2 % SOLN solution Take 10 mLs by mouth every 3 hours as needed for Irritation 10/25/24  Yes Scottie Guthrie, DO   amitriptyline (ELAVIL) 25 MG tablet 1-2  po nightly 10/15/24  Yes Yunior Oates DO   metFORMIN (GLUCOPHAGE-XR) 500 MG extended release tablet TAKE 2 TABLETS IN THE MORNING AND 2 TABLETS AT DINNER 10/15/24  Yes Yunior Oates DO   pantoprazole (PROTONIX) 40 MG tablet Take 1 tablet by mouth daily 10/15/24  Yes Yunior Oates DO   rosuvastatin (CRESTOR) 5 MG tablet Take 1 tablet by mouth daily 10/15/24  Yes Yunior Oates DO   venlafaxine (EFFEXOR XR) 75 MG extended release capsule Take 1 capsule by mouth daily 10/15/24  Yes Yunior Oates DO   clotrimazole-betamethasone (LOTRISONE) 1-0.05 % cream Apply topically 2 times daily. 24  Yes Yunior Oates DO   busPIRone (BUSPAR) 5 MG tablet Take 1 tablet by mouth 2 times daily 24  Yes Yunior Oates DO   blood glucose monitor strips Test one times a day 3/27/24  Yes Yunior Oates DO   Lancets MISC 1 each by Does not apply route daily 23  Yes Yunior Oates DO   NONFORMULARY Neurotrophin PMG   Yes Tu Juan MD   Omega-3 Fatty Acids (OMEGA-3 FISH OIL PO) Take by mouth   Yes Tu Juan MD   TURMERIC PO Take by mouth   Yes Tu Juan MD   NONFORMULARY Cataplex G   Yes Tu Juan MD   NONFORMULARY Asura Brain   Yes Tu Juan MD   acetaminophen (TYLENOL) 325 MG tablet Take 2 tablets by mouth every 6 hours as needed for Pain   Yes Provider, MD Tu   Melatonin 10 MG

## 2025-02-03 ENCOUNTER — HOSPITAL ENCOUNTER (OUTPATIENT)
Dept: GENERAL RADIOLOGY | Age: 60
Discharge: HOME | End: 2025-02-05
Attending: FAMILY MEDICINE
Payer: COMMERCIAL

## 2025-02-03 DIAGNOSIS — K21.00 GASTROESOPHAGEAL REFLUX DISEASE WITH ESOPHAGITIS WITHOUT HEMORRHAGE: ICD-10-CM

## 2025-02-03 DIAGNOSIS — R13.10 SWALLOWING DYSFUNCTION: ICD-10-CM

## 2025-02-03 PROCEDURE — 2500000003 HC RX 250 WO HCPCS: Performed by: PHYSICIAN ASSISTANT

## 2025-02-03 PROCEDURE — 74220 X-RAY XM ESOPHAGUS 1CNTRST: CPT

## 2025-02-03 PROCEDURE — 6370000000 HC RX 637 (ALT 250 FOR IP): Performed by: PHYSICIAN ASSISTANT

## 2025-02-03 RX ADMIN — BARIUM SULFATE 176 G: 960 POWDER, FOR SUSPENSION ORAL at 15:04

## 2025-02-03 RX ADMIN — ANTACID/ANTIFLATULENT 1 EACH: 380; 550; 10; 10 GRANULE, EFFERVESCENT ORAL at 15:03

## 2025-02-03 RX ADMIN — BARIUM SULFATE 140 ML: 980 POWDER, FOR SUSPENSION ORAL at 15:04

## 2025-02-03 RX ADMIN — BARIUM SULFATE 1 TABLET: 700 TABLET ORAL at 15:04

## 2025-02-13 ENCOUNTER — OFFICE VISIT (OUTPATIENT)
Dept: SURGERY | Age: 60
End: 2025-02-13
Payer: COMMERCIAL

## 2025-02-13 VITALS
DIASTOLIC BLOOD PRESSURE: 71 MMHG | WEIGHT: 220 LBS | BODY MASS INDEX: 38.98 KG/M2 | SYSTOLIC BLOOD PRESSURE: 121 MMHG | HEIGHT: 63 IN | HEART RATE: 75 BPM | OXYGEN SATURATION: 98 % | RESPIRATION RATE: 18 BRPM

## 2025-02-13 DIAGNOSIS — K21.9 GASTROESOPHAGEAL REFLUX DISEASE, UNSPECIFIED WHETHER ESOPHAGITIS PRESENT: Primary | ICD-10-CM

## 2025-02-13 DIAGNOSIS — R13.19 ESOPHAGEAL DYSPHAGIA: ICD-10-CM

## 2025-02-13 DIAGNOSIS — K22.4 ESOPHAGEAL DYSFUNCTION: ICD-10-CM

## 2025-02-13 PROCEDURE — 1036F TOBACCO NON-USER: CPT | Performed by: SURGERY

## 2025-02-13 PROCEDURE — 3078F DIAST BP <80 MM HG: CPT | Performed by: SURGERY

## 2025-02-13 PROCEDURE — G8427 DOCREV CUR MEDS BY ELIG CLIN: HCPCS | Performed by: SURGERY

## 2025-02-13 PROCEDURE — G8417 CALC BMI ABV UP PARAM F/U: HCPCS | Performed by: SURGERY

## 2025-02-13 PROCEDURE — 3017F COLORECTAL CA SCREEN DOC REV: CPT | Performed by: SURGERY

## 2025-02-13 PROCEDURE — 99204 OFFICE O/P NEW MOD 45 MIN: CPT | Performed by: SURGERY

## 2025-02-13 PROCEDURE — 3074F SYST BP LT 130 MM HG: CPT | Performed by: SURGERY

## 2025-02-17 NOTE — PROGRESS NOTES
[facility] Surgery Clinic Note    Assessment & Plan  1. Dysphagia.  The patient's dysphagia appears to be localized at the gastroesophageal junction, with evidence of abnormal esophageal contractions. A manometry test will be ordered to further investigate the cause of the dysphagia. She is advised to continue her current regimen of Protonix 40 mg daily for reflux management. The hospital will provide instructions regarding medication use prior to the procedure. Depending on the results, potential treatments include Botox injections around the sphincter, esophageal dilation, or surgery.    2. Gastroesophageal Reflux Disease (GERD).  She has been on pantoprazole for several years for reflux management. She is advised to continue her current regimen of Protonix 40 mg daily. The hospital will provide instructions regarding medication use prior to the procedure.    3. Esophageal dysmotility.  The patient's dysphagia appears to be localized at the gastroesophageal junction, with evidence of abnormal esophageal contractions. A manometry test will be ordered to further investigate the cause of the dysphagia.    Return for EGD.    Francine was seen today for new patient.    Diagnoses and all orders for this visit:    Gastroesophageal reflux disease, unspecified whether esophagitis present    Esophageal dysphagia    Esophageal dysfunction          Chief Complaint   Patient presents with    New Patient     Swallowing dysfunction/GERD (Ref Dr Oates)         PCP: Yunior Oates DO  CC: Yunior Oates DO     Francine Sesay is a 59 y.o. female .    History of Present Illness  The patient presents for evaluation of trouble swallowing.    She has been experiencing increased difficulty with swallowing, which prompted her physician to refer her for a barium swallow test. This test was conducted a few weeks prior, and she was informed that she had failed within the initial few minutes. She reports occasional

## 2025-03-03 ENCOUNTER — PREP FOR PROCEDURE (OUTPATIENT)
Dept: SURGERY | Age: 60
End: 2025-03-03

## 2025-03-03 ENCOUNTER — TELEPHONE (OUTPATIENT)
Dept: SURGERY | Age: 60
End: 2025-03-03

## 2025-03-03 DIAGNOSIS — K22.4 ESOPHAGEAL DYSFUNCTION: ICD-10-CM

## 2025-03-03 DIAGNOSIS — R13.19 ESOPHAGEAL DYSPHAGIA: ICD-10-CM

## 2025-03-03 PROBLEM — K21.9 GERD (GASTROESOPHAGEAL REFLUX DISEASE): Status: ACTIVE | Noted: 2025-03-03

## 2025-03-03 NOTE — TELEPHONE ENCOUNTER
Francine Sesay is scheduled for EGD with Botox injection 100 units with Dr West on 04-15-25 at [facility]. Patient needs to be NPO after midnight the night before procedure. All surgery instructions were explained to the patient and a surgery letter was also mailed out. MA informed patient that PAT will also be calling to review pre-op instructions and medications. Patient verbalized understanding.  Patient is scheduled for manometry on 03-18-25 to be read by Dr Vyas.  Electronically signed by Amrita Duran MA on 3/3/2025 at 7:07 AM  ]

## 2025-03-27 ENCOUNTER — CLINICAL DOCUMENTATION (OUTPATIENT)
Dept: SURGERY | Age: 60
End: 2025-03-27

## 2025-03-27 NOTE — PROGRESS NOTES
MA faxed manometry order and demographics to [facility] endo department   [phone]  Electronically signed by Amrita Duran MA on 3/27/2025 at 10:21 AM

## 2025-03-31 ENCOUNTER — OFFICE VISIT (OUTPATIENT)
Dept: PRIMARY CARE CLINIC | Age: 60
End: 2025-03-31
Payer: COMMERCIAL

## 2025-03-31 ENCOUNTER — CLINICAL DOCUMENTATION (OUTPATIENT)
Dept: SURGERY | Age: 60
End: 2025-03-31

## 2025-03-31 VITALS
SYSTOLIC BLOOD PRESSURE: 120 MMHG | DIASTOLIC BLOOD PRESSURE: 80 MMHG | HEART RATE: 77 BPM | OXYGEN SATURATION: 98 % | BODY MASS INDEX: 38.45 KG/M2 | TEMPERATURE: 97.6 F | WEIGHT: 217 LBS | HEIGHT: 63 IN

## 2025-03-31 DIAGNOSIS — E11.9 TYPE 2 DIABETES MELLITUS WITHOUT COMPLICATION, WITHOUT LONG-TERM CURRENT USE OF INSULIN: ICD-10-CM

## 2025-03-31 DIAGNOSIS — I10 ESSENTIAL HYPERTENSION: Primary | ICD-10-CM

## 2025-03-31 DIAGNOSIS — Z12.31 ENCOUNTER FOR SCREENING MAMMOGRAM FOR MALIGNANT NEOPLASM OF BREAST: ICD-10-CM

## 2025-03-31 DIAGNOSIS — F41.9 ANXIETY: ICD-10-CM

## 2025-03-31 DIAGNOSIS — E78.00 PURE HYPERCHOLESTEROLEMIA: ICD-10-CM

## 2025-03-31 DIAGNOSIS — R13.10 SWALLOWING DYSFUNCTION: ICD-10-CM

## 2025-03-31 PROCEDURE — G8427 DOCREV CUR MEDS BY ELIG CLIN: HCPCS | Performed by: FAMILY MEDICINE

## 2025-03-31 PROCEDURE — G8417 CALC BMI ABV UP PARAM F/U: HCPCS | Performed by: FAMILY MEDICINE

## 2025-03-31 PROCEDURE — 3074F SYST BP LT 130 MM HG: CPT | Performed by: FAMILY MEDICINE

## 2025-03-31 PROCEDURE — 3017F COLORECTAL CA SCREEN DOC REV: CPT | Performed by: FAMILY MEDICINE

## 2025-03-31 PROCEDURE — 2022F DILAT RTA XM EVC RTNOPTHY: CPT | Performed by: FAMILY MEDICINE

## 2025-03-31 PROCEDURE — 99214 OFFICE O/P EST MOD 30 MIN: CPT | Performed by: FAMILY MEDICINE

## 2025-03-31 PROCEDURE — 3079F DIAST BP 80-89 MM HG: CPT | Performed by: FAMILY MEDICINE

## 2025-03-31 PROCEDURE — 3044F HG A1C LEVEL LT 7.0%: CPT | Performed by: FAMILY MEDICINE

## 2025-03-31 PROCEDURE — 1036F TOBACCO NON-USER: CPT | Performed by: FAMILY MEDICINE

## 2025-03-31 RX ORDER — BUSPIRONE HYDROCHLORIDE 5 MG/1
5 TABLET ORAL 2 TIMES DAILY
Qty: 180 TABLET | Refills: 3 | Status: SHIPPED | OUTPATIENT
Start: 2025-03-31

## 2025-03-31 RX ORDER — VENLAFAXINE HYDROCHLORIDE 75 MG/1
75 CAPSULE, EXTENDED RELEASE ORAL DAILY
Qty: 90 CAPSULE | Refills: 2 | Status: SHIPPED | OUTPATIENT
Start: 2025-03-31

## 2025-03-31 SDOH — ECONOMIC STABILITY: FOOD INSECURITY: WITHIN THE PAST 12 MONTHS, YOU WORRIED THAT YOUR FOOD WOULD RUN OUT BEFORE YOU GOT MONEY TO BUY MORE.: NEVER TRUE

## 2025-03-31 SDOH — ECONOMIC STABILITY: FOOD INSECURITY: WITHIN THE PAST 12 MONTHS, THE FOOD YOU BOUGHT JUST DIDN'T LAST AND YOU DIDN'T HAVE MONEY TO GET MORE.: NEVER TRUE

## 2025-03-31 ASSESSMENT — ENCOUNTER SYMPTOMS
GASTROINTESTINAL NEGATIVE: 1
ALLERGIC/IMMUNOLOGIC NEGATIVE: 1
EYES NEGATIVE: 1
RESPIRATORY NEGATIVE: 1

## 2025-03-31 NOTE — PROGRESS NOTES
3/31/25     Francine Sesay    : 1965 Sex: female   Age: 59 y.o.      Chief Complaint   Patient presents with    Hypertension       Prior to Admission medications    Medication Sig Start Date End Date Taking? Authorizing Provider   venlafaxine (EFFEXOR XR) 75 MG extended release capsule Take 1 capsule by mouth daily 3/31/25  Yes Yunior Oates DO   busPIRone (BUSPAR) 5 MG tablet Take 1 tablet by mouth 2 times daily 3/31/25  Yes Yunior Oates, DO   Semaglutide,0.25 or 0.5MG/DOS, 2 MG/3ML SOPN 0.5mg q week 2/3/25  Yes Yunior Oates DO   lidocaine viscous hcl (XYLOCAINE) 2 % SOLN solution Take 10 mLs by mouth every 3 hours as needed for Irritation 10/25/24  Yes Scottie Guthrie, DO   amitriptyline (ELAVIL) 25 MG tablet 1-2  po nightly 10/15/24  Yes Yunior Oates DO   metFORMIN (GLUCOPHAGE-XR) 500 MG extended release tablet TAKE 2 TABLETS IN THE MORNING AND 2 TABLETS AT DINNER 10/15/24  Yes Yunior Oates DO   pantoprazole (PROTONIX) 40 MG tablet Take 1 tablet by mouth daily 10/15/24  Yes Yunior Oates DO   rosuvastatin (CRESTOR) 5 MG tablet Take 1 tablet by mouth daily 10/15/24  Yes Yunior Oates DO   clotrimazole-betamethasone (LOTRISONE) 1-0.05 % cream Apply topically 2 times daily. 24  Yes Yunior Oates DO   blood glucose monitor strips Test one times a day 3/27/24  Yes Yunior Oates DO   Lancets MISC 1 each by Does not apply route daily 23  Yes Yunior Oates DO   NONFORMULARY Neurotrophin PMG   Yes Tu Juan MD   Omega-3 Fatty Acids (OMEGA-3 FISH OIL PO) Take by mouth   Yes Tu Juan MD   TURMERIC PO Take by mouth   Yes Tu Juan MD   NONFORMULARY Cataplex G   Yes Tu Juan MD   NONFORMULARY Asura Brain   Yes Tu Juan MD   acetaminophen (TYLENOL) 325 MG tablet Take 2 tablets by mouth every 6 hours as needed for Pain   Yes Provider, MD Tu   Melatonin 10 MG TABS Take by mouth

## 2025-03-31 NOTE — PROGRESS NOTES
MA spoke with Cris at [facility] regarding the peer to peer which is due today for patient's Botox injection which was denied.  Cris suggested that we do a clinical withdrawal of the request and then re-submit to extend the peer to peer once it is denied again.  MA reached out to Angi at the prior auth dept and explained the above and asked her to resubmit the Botox injection with the EGD.  Electronically signed by Amrita Duran MA on 3/31/2025 at 2:42 PM

## 2025-04-01 ENCOUNTER — TELEPHONE (OUTPATIENT)
Dept: ENDOSCOPY | Age: 60
End: 2025-04-01

## 2025-04-01 NOTE — TELEPHONE ENCOUNTER
Called patient to schedule esophageal manometry. Patient is scheduled for 4/03/2025 @ 5145. Instructions given.

## 2025-04-03 ENCOUNTER — HOSPITAL ENCOUNTER (OUTPATIENT)
Age: 60
Setting detail: OUTPATIENT SURGERY
Discharge: HOME | End: 2025-04-03
Attending: INTERNAL MEDICINE | Admitting: INTERNAL MEDICINE
Payer: COMMERCIAL

## 2025-04-03 PROCEDURE — 3609019000 HC CAPSULE ENDOSCOPY: Performed by: INTERNAL MEDICINE

## 2025-04-03 PROCEDURE — 3609015500 HC GASTRIC/DUODENAL MOTILITY &/OR MANOMETRY STUDY: Performed by: INTERNAL MEDICINE

## 2025-04-03 PROCEDURE — 6370000000 HC RX 637 (ALT 250 FOR IP): Performed by: INTERNAL MEDICINE

## 2025-04-03 RX ORDER — LIDOCAINE HYDROCHLORIDE 20 MG/ML
JELLY TOPICAL PRN
Status: DISCONTINUED | OUTPATIENT
Start: 2025-04-03 | End: 2025-04-03 | Stop reason: ALTCHOICE

## 2025-04-03 NOTE — PROGRESS NOTES
After confirmation of potential allergies, a topical analgesic was used to numb the nares followed by trans-nasal insertion of high resolution Manometry catheter with impendence to 50.5cm. Pressure Bands of both UES and LES were observed on the contour color. Patient instructed to take deep breath to verify placement of catheter and diaphragmatic pinch noted on Inspiration patient was assisted to semi supine position and catheter was secured with tape. Patient encouraged to relax while acclimating to catheter for several minutes. A 30 second baseline pressure was obtained to identify landmarks a series of wet swallows with 5 cc of room temperature were then administered to assess esophageal motility and impendence. At conclusion of procedure the catheter was removed. Patient tolerated procedure. Discharged home, stable.

## 2025-04-10 ENCOUNTER — TELEPHONE (OUTPATIENT)
Dept: SURGERY | Age: 60
End: 2025-04-10

## 2025-04-10 NOTE — TELEPHONE ENCOUNTER
MA informed patient that her insurance denied the Botox injection which she was supposed to have with her EGD.  .[email or web address]

## 2025-04-10 NOTE — PROGRESS NOTES
[facility] PRE-ADMISSION TESTING INSTRUCTIONS    The Preadmission Testing patient is instructed accordingly using the following criteria (check applicable):    ARRIVAL INSTRUCTIONS:  [x] Parking the day of Surgery is located in the Main Entrance lot.  Upon entering the door, make an immediate right to the surgery reception desk    [x] Bring photo ID and insurance card    [x] Please be sure to arrange for a responsible adult to provide transportation to and from the hospital    [x] Please arrange for a responsible adult to be with you for the 24 hour period post procedure due to having anesthesia    [x] If you awake am of surgery not feeling well or have temperature >100 please call [phone]    GENERAL INSTRUCTIONS:    [x] No solid foods after midnight, no gum, candy or mints.   May have up to 8oz clear liquids up to 4 hours prior to procedure        [x] You may brush your teeth, do not swallow any toothpaste    [x] Take medications as instructed     [x] Stop herbal supplements and vitamins 5 days prior to procedure    [x] No diabetic medications after midnight; do not take ozempic for at least 7 days preop    [x] No tobacco products within 24 hours of surgery     [x] No alcohol or illegal drug use, marijuana included, within 24 hours of surgery.    [x] Shower or bath with soap, lather and rinse well, AM of Surgery, no lotion, powders or creams to surgical site    [x] Jewelry, body piercing's, eyeglasses, contact lenses and dentures are not permitted into surgery (bring cases)      [x] Please do not wear any nail polish, make up or hair products on the day of surgery    [x] You can expect a call the business day prior to procedure to notify you if your arrival time changes    [x] If you receive a survey after surgery we would greatly appreciate your comments    [x] Please notify surgeon if you develop any illness between now and time of surgery (cold, cough, sore throat, fever, nausea,

## 2025-04-15 ENCOUNTER — ANESTHESIA EVENT (OUTPATIENT)
Dept: ENDOSCOPY | Age: 60
End: 2025-04-15
Payer: COMMERCIAL

## 2025-04-15 ENCOUNTER — HOSPITAL ENCOUNTER (OUTPATIENT)
Age: 60
Setting detail: OUTPATIENT SURGERY
Discharge: HOME | End: 2025-04-15
Attending: SURGERY | Admitting: SURGERY
Payer: COMMERCIAL

## 2025-04-15 ENCOUNTER — ANESTHESIA (OUTPATIENT)
Dept: ENDOSCOPY | Age: 60
End: 2025-04-15
Payer: COMMERCIAL

## 2025-04-15 VITALS
WEIGHT: 215 LBS | TEMPERATURE: 96.6 F | HEIGHT: 63 IN | SYSTOLIC BLOOD PRESSURE: 133 MMHG | HEART RATE: 72 BPM | RESPIRATION RATE: 20 BRPM | BODY MASS INDEX: 38.09 KG/M2 | DIASTOLIC BLOOD PRESSURE: 71 MMHG | OXYGEN SATURATION: 95 %

## 2025-04-15 DIAGNOSIS — R13.19 ESOPHAGEAL DYSPHAGIA: ICD-10-CM

## 2025-04-15 DIAGNOSIS — K22.4 ESOPHAGEAL DYSFUNCTION: ICD-10-CM

## 2025-04-15 DIAGNOSIS — K21.9 GERD (GASTROESOPHAGEAL REFLUX DISEASE): ICD-10-CM

## 2025-04-15 LAB — GLUCOSE BLD-MCNC: 107 MG/DL (ref 74–99)

## 2025-04-15 PROCEDURE — 43249 ESOPH EGD DILATION <30 MM: CPT | Performed by: SURGERY

## 2025-04-15 PROCEDURE — 6360000002 HC RX W HCPCS

## 2025-04-15 PROCEDURE — C1726 CATH, BAL DIL, NON-VASCULAR: HCPCS | Performed by: SURGERY

## 2025-04-15 PROCEDURE — 3700000001 HC ADD 15 MINUTES (ANESTHESIA): Performed by: SURGERY

## 2025-04-15 PROCEDURE — 2580000003 HC RX 258

## 2025-04-15 PROCEDURE — 3700000000 HC ANESTHESIA ATTENDED CARE: Performed by: SURGERY

## 2025-04-15 PROCEDURE — 43239 EGD BIOPSY SINGLE/MULTIPLE: CPT | Performed by: SURGERY

## 2025-04-15 PROCEDURE — 2720000010 HC SURG SUPPLY STERILE: Performed by: SURGERY

## 2025-04-15 PROCEDURE — 82962 GLUCOSE BLOOD TEST: CPT

## 2025-04-15 PROCEDURE — 3609017700 HC EGD DILATION GASTRIC/DUODENAL STRICTURE: Performed by: SURGERY

## 2025-04-15 PROCEDURE — 88305 TISSUE EXAM BY PATHOLOGIST: CPT

## 2025-04-15 PROCEDURE — 2709999900 HC NON-CHARGEABLE SUPPLY: Performed by: SURGERY

## 2025-04-15 PROCEDURE — 7100000011 HC PHASE II RECOVERY - ADDTL 15 MIN: Performed by: SURGERY

## 2025-04-15 PROCEDURE — 7100000010 HC PHASE II RECOVERY - FIRST 15 MIN: Performed by: SURGERY

## 2025-04-15 RX ORDER — SODIUM CHLORIDE 9 MG/ML
INJECTION, SOLUTION INTRAVENOUS
Status: DISCONTINUED | OUTPATIENT
Start: 2025-04-15 | End: 2025-04-15 | Stop reason: SDUPTHER

## 2025-04-15 RX ORDER — LIDOCAINE HYDROCHLORIDE 20 MG/ML
INJECTION, SOLUTION EPIDURAL; INFILTRATION; INTRACAUDAL; PERINEURAL
Status: DISCONTINUED | OUTPATIENT
Start: 2025-04-15 | End: 2025-04-15 | Stop reason: SDUPTHER

## 2025-04-15 RX ORDER — ESMOLOL HYDROCHLORIDE 10 MG/ML
INJECTION INTRAVENOUS
Status: DISCONTINUED | OUTPATIENT
Start: 2025-04-15 | End: 2025-04-15 | Stop reason: SDUPTHER

## 2025-04-15 RX ORDER — PROPOFOL 10 MG/ML
INJECTION, EMULSION INTRAVENOUS
Status: DISCONTINUED | OUTPATIENT
Start: 2025-04-15 | End: 2025-04-15 | Stop reason: SDUPTHER

## 2025-04-15 RX ADMIN — ESMOLOL HYDROCHLORIDE 30 MG: 10 INJECTION, SOLUTION INTRAVENOUS at 07:41

## 2025-04-15 RX ADMIN — SODIUM CHLORIDE: 9 INJECTION, SOLUTION INTRAVENOUS at 07:32

## 2025-04-15 RX ADMIN — PROPOFOL 350 MG: 10 INJECTION, EMULSION INTRAVENOUS at 07:34

## 2025-04-15 RX ADMIN — LIDOCAINE HYDROCHLORIDE 40 MG: 20 INJECTION, SOLUTION EPIDURAL; INFILTRATION; INTRACAUDAL; PERINEURAL at 07:34

## 2025-04-15 ASSESSMENT — PAIN - FUNCTIONAL ASSESSMENT
PAIN_FUNCTIONAL_ASSESSMENT: 0-10
PAIN_FUNCTIONAL_ASSESSMENT: 0-10

## 2025-04-15 NOTE — ANESTHESIA POSTPROCEDURE EVALUATION
Department of Anesthesiology  Postprocedure Note    Patient: Francine Sesay  MRN: [id number]  YOB: 1965  Date of evaluation: 4/15/2025    Procedure Summary       Date: 04/15/25 Room / Location: [place] [address] / [facility]    Anesthesia Start: 0732 Anesthesia Stop: 0751    Procedures:       ESOPHAGOGASTRODUODENOSCOPY BIOPSY      ESOPHAGOGASTRODUODENOSCOPY DILATION BALLOON Diagnosis:       GERD (gastroesophageal reflux disease)      Esophageal dysphagia      Esophageal dysfunction      (GERD (gastroesophageal reflux disease) [K21.9])      (Esophageal dysphagia [R13.19])      (Esophageal dysfunction [K22.4])    Surgeons: Diego West MD Responsible Provider: Katia Wells DO    Anesthesia Type: MAC ASA Status: 3            Anesthesia Type: MAC    Meri Phase I: Meri Score: 10    Meri Phase II: Meri Score: 10    Anesthesia Post Evaluation    Patient location during evaluation: PACU  Patient participation: complete - patient participated  Level of consciousness: awake and alert  Airway patency: patent  Nausea & Vomiting: no nausea and no vomiting  Cardiovascular status: hemodynamically stable  Respiratory status: acceptable  Hydration status: euvolemic  Pain management: adequate    No notable events documented.

## 2025-04-15 NOTE — ANESTHESIA PRE PROCEDURE
Department of Anesthesiology  Preprocedure Note       Name:  Francine Sesay   Age:  59 y.o.  :  1965                                          MRN:  [id number]         Date:  4/15/2025      Surgeon: Surgeon(s):  Diego West MD    Procedure: Procedure(s):  ESOPHAGOGASTRODUODENOSCOPY    Medications prior to admission:   Prior to Admission medications    Medication Sig Start Date End Date Taking? Authorizing Provider   venlafaxine (EFFEXOR XR) 75 MG extended release capsule Take 1 capsule by mouth daily 3/31/25  Yes Yunior Oates DO   busPIRone (BUSPAR) 5 MG tablet Take 1 tablet by mouth 2 times daily 3/31/25  Yes Yunior Oates DO   amitriptyline (ELAVIL) 25 MG tablet 1-2  po nightly 10/15/24  Yes Yunior Oates DO   metFORMIN (GLUCOPHAGE-XR) 500 MG extended release tablet TAKE 2 TABLETS IN THE MORNING AND 2 TABLETS AT DINNER 10/15/24  Yes Yunior Oates DO   pantoprazole (PROTONIX) 40 MG tablet Take 1 tablet by mouth daily 10/15/24  Yes Yunior Oates DO   rosuvastatin (CRESTOR) 5 MG tablet Take 1 tablet by mouth daily 10/15/24  Yes Yunior Oates DO   Omega-3 Fatty Acids (OMEGA-3 FISH OIL PO) Take by mouth   Yes Tu Juan MD   Melatonin 10 MG TABS Take by mouth   Yes Tu Juan MD   Semaglutide, 1 MG/DOSE, (OZEMPIC) 4 MG/3ML SOPN sc injection Inject 1 mg into the skin every 7 days 3/31/25   Yunior Oates DO   clotrimazole-betamethasone (LOTRISONE) 1-0.05 % cream Apply topically 2 times daily.  Patient not taking: Reported on 4/15/2025 6/24/24   Yunior Oates DO   blood glucose monitor strips Test one times a day 3/27/24   Yunior Oates DO   Lancets MISC 1 each by Does not apply route daily 23   Yunior Oates DO   NONFORMULARY Neurotrophin PMG  Patient not taking: Reported on 4/10/2025    Tu Juan MD   TURMERIC PO Take by mouth  Patient not taking: Reported on 4/10/2025    Provider, Tu, MD   NONFORMULARY

## 2025-04-15 NOTE — H&P
Signed       Expand All Collapse All[]Expand All by Default         [facility] Surgery Clinic Note     Assessment & Plan  1. Dysphagia.  The patient's dysphagia appears to be localized at the gastroesophageal junction, with evidence of abnormal esophageal contractions. A manometry test will be ordered to further investigate the cause of the dysphagia. She is advised to continue her current regimen of Protonix 40 mg daily for reflux management. The hospital will provide instructions regarding medication use prior to the procedure. Depending on the results, potential treatments include Botox injections around the sphincter, esophageal dilation, or surgery.     2. Gastroesophageal Reflux Disease (GERD).  She has been on pantoprazole for several years for reflux management. She is advised to continue her current regimen of Protonix 40 mg daily. The hospital will provide instructions regarding medication use prior to the procedure.     3. Esophageal dysmotility.  The patient's dysphagia appears to be localized at the gastroesophageal junction, with evidence of abnormal esophageal contractions. A manometry test will be ordered to further investigate the cause of the dysphagia.     Return for EGD.     Francine was seen today for new patient.     Diagnoses and all orders for this visit:     Gastroesophageal reflux disease, unspecified whether esophagitis present     Esophageal dysphagia     Esophageal dysfunction      Addendum: Insurance decline botox - spoke P2P - need to attempt dilation prior.              Chief Complaint   Patient presents with    New Patient       Swallowing dysfunction/GERD (Ref Dr Oates)            PCP: Yunior Oates DO  CC: Yunior Oates DO      Francine Sesay is a 59 y.o. female .     History of Present Illness  The patient presents for evaluation of trouble swallowing.     She has been experiencing increased difficulty with swallowing, which prompted her physician to refer her for   Appearance: She is obese.      Obesity potentially increases the risks of perioperative complications, including wound healing, infections, and cardiopulmonary risks.        CBC:         Lab Results   Component Value Date/Time     WBC 6.5 01/03/2025 08:37 AM     RBC 4.43 01/03/2025 08:37 AM     HGB 13.6 01/03/2025 08:37 AM     HCT 41.4 01/03/2025 08:37 AM     MCV 93.5 01/03/2025 08:37 AM     MCH 30.7 01/03/2025 08:37 AM     MCHC 32.9 01/03/2025 08:37 AM     RDW 13.5 01/03/2025 08:37 AM      01/03/2025 08:37 AM     MPV 9.5 01/03/2025 08:37 AM      CMP:          Lab Results   Component Value Date/Time      01/03/2025 08:37 AM     K 4.5 01/03/2025 08:37 AM     K 4.2 02/08/2022 06:03 AM      01/03/2025 08:37 AM     CO2 27 01/03/2025 08:37 AM     BUN 12 01/03/2025 08:37 AM     CREATININE 1.0 01/03/2025 08:37 AM     GFRAA >60 04/12/2022 08:05 AM     LABGLOM 65 01/03/2025 08:37 AM     LABGLOM >60 12/29/2023 08:05 AM     GLUCOSE 102 01/03/2025 08:37 AM     CALCIUM 9.4 01/03/2025 08:37 AM     BILITOT 0.5 01/03/2025 08:37 AM     ALKPHOS 90 01/03/2025 08:37 AM     AST 25 01/03/2025 08:37 AM     ALT 33 01/03/2025 08:37 AM      PT/INR:          Lab Results   Component Value Date/Time     PROTIME 11.0 02/06/2022 10:03 PM     INR 1.0 02/06/2022 10:03 PM      HgBA1c:          Lab Results   Component Value Date/Time     LABA1C 6.0 01/03/2025 08:37 AM      LIPASE:  No results found for: \"LIPASE\"               Diego West MD        I have examined the patient and performed the key aspects of the physical exam,and reviewed the record (including laboratory findings, results, and all pertinent radiology images, which are independently reviewed and interpreted unless otherwise explicitly stated). The referring provider's notes were also reviewed.     Any procedures planned or discussed as above had risks, benefits, and reasonable alternatives thoroughly discussed with the patient or responsible party.      If

## 2025-04-23 LAB — SURGICAL PATHOLOGY REPORT: NORMAL

## 2025-04-29 ENCOUNTER — OFFICE VISIT (OUTPATIENT)
Dept: SURGERY | Age: 60
End: 2025-04-29
Payer: COMMERCIAL

## 2025-04-29 VITALS
OXYGEN SATURATION: 98 % | RESPIRATION RATE: 18 BRPM | WEIGHT: 216.6 LBS | SYSTOLIC BLOOD PRESSURE: 160 MMHG | DIASTOLIC BLOOD PRESSURE: 88 MMHG | HEART RATE: 74 BPM | HEIGHT: 63 IN | BODY MASS INDEX: 38.38 KG/M2

## 2025-04-29 DIAGNOSIS — K21.9 GASTROESOPHAGEAL REFLUX DISEASE, UNSPECIFIED WHETHER ESOPHAGITIS PRESENT: ICD-10-CM

## 2025-04-29 DIAGNOSIS — K22.4 ESOPHAGEAL DYSFUNCTION: ICD-10-CM

## 2025-04-29 DIAGNOSIS — K44.9 HIATAL HERNIA: ICD-10-CM

## 2025-04-29 DIAGNOSIS — R13.19 ESOPHAGEAL DYSPHAGIA: Primary | ICD-10-CM

## 2025-04-29 PROCEDURE — 3017F COLORECTAL CA SCREEN DOC REV: CPT | Performed by: SURGERY

## 2025-04-29 PROCEDURE — 1036F TOBACCO NON-USER: CPT | Performed by: SURGERY

## 2025-04-29 PROCEDURE — G8427 DOCREV CUR MEDS BY ELIG CLIN: HCPCS | Performed by: SURGERY

## 2025-04-29 PROCEDURE — 3079F DIAST BP 80-89 MM HG: CPT | Performed by: SURGERY

## 2025-04-29 PROCEDURE — G8417 CALC BMI ABV UP PARAM F/U: HCPCS | Performed by: SURGERY

## 2025-04-29 PROCEDURE — 99214 OFFICE O/P EST MOD 30 MIN: CPT | Performed by: SURGERY

## 2025-04-29 PROCEDURE — 3077F SYST BP >= 140 MM HG: CPT | Performed by: SURGERY

## 2025-04-30 NOTE — PROGRESS NOTES
[facility] Surgery Clinic Note    Assessment & Plan  1. Dysphagia.  The patient's dysphagia is likely due to fragmented peristalsis, as evidenced by the manometry results. The lower esophageal sphincter appears normal on the manometry, with no signs of increased resting pressure or hypertonicity. She should take small bites and chew thoroughly before swallowing. A repeat dilation procedure may be considered in the future if necessary.    2. GERD.  She is advised to monitor her diet closely, particularly avoiding foods that may exacerbate her symptoms. She is advised to continue taking Protonix for reflux management.    3. Hiatal Hernia.  The patient has a small hiatal hernia, measuring about 3 cm. It is not currently causing significant issues, but it will be monitored to ensure it does not worsen. Surgical intervention is not recommended at this time due to the potential for worsening swallowing symptoms.     Follow-up  The patient will follow up in 6 months.    PROCEDURE  The patient underwent a scope and stretch procedure.    No follow-ups on file.    There are no diagnoses linked to this encounter.      Chief Complaint   Patient presents with    Follow-up     EGD follow up, 04-15-25       PCP: Yunior Oates DO  CC: No ref. provider found     Francine Sesay is a 59 y.o. female .    History of Present Illness  The patient presents for evaluation of dysphagia and hiatal hernia.    She reports a persistent sore throat following the scope procedure, which has since improved. She experiences difficulty swallowing on several occasions, necessitating the intake of small sips of water. The sensation of food impaction is localized to the lower mid esophagus. Her reflux symptoms are currently well-managed. Despite the scope and stretch procedures, there has been no significant improvement in her swallowing function. She also notes that certain foods, such as bread and mashed potatoes, tend to become lodged in

## 2025-05-02 ENCOUNTER — OFFICE VISIT (OUTPATIENT)
Dept: PRIMARY CARE CLINIC | Age: 60
End: 2025-05-02
Payer: COMMERCIAL

## 2025-05-02 VITALS
SYSTOLIC BLOOD PRESSURE: 126 MMHG | HEIGHT: 63 IN | OXYGEN SATURATION: 96 % | WEIGHT: 214 LBS | BODY MASS INDEX: 37.92 KG/M2 | HEART RATE: 88 BPM | DIASTOLIC BLOOD PRESSURE: 80 MMHG | TEMPERATURE: 97.5 F

## 2025-05-02 DIAGNOSIS — R13.10 SWALLOWING DYSFUNCTION: ICD-10-CM

## 2025-05-02 DIAGNOSIS — K21.00 GASTROESOPHAGEAL REFLUX DISEASE WITH ESOPHAGITIS WITHOUT HEMORRHAGE: ICD-10-CM

## 2025-05-02 DIAGNOSIS — E11.9 TYPE 2 DIABETES MELLITUS WITHOUT COMPLICATION, WITHOUT LONG-TERM CURRENT USE OF INSULIN (HCC): ICD-10-CM

## 2025-05-02 DIAGNOSIS — F41.9 ANXIETY: ICD-10-CM

## 2025-05-02 DIAGNOSIS — E78.00 PURE HYPERCHOLESTEROLEMIA: ICD-10-CM

## 2025-05-02 DIAGNOSIS — I10 ESSENTIAL HYPERTENSION: Primary | ICD-10-CM

## 2025-05-02 PROCEDURE — 3044F HG A1C LEVEL LT 7.0%: CPT | Performed by: FAMILY MEDICINE

## 2025-05-02 PROCEDURE — 3079F DIAST BP 80-89 MM HG: CPT | Performed by: FAMILY MEDICINE

## 2025-05-02 PROCEDURE — 3017F COLORECTAL CA SCREEN DOC REV: CPT | Performed by: FAMILY MEDICINE

## 2025-05-02 PROCEDURE — 1036F TOBACCO NON-USER: CPT | Performed by: FAMILY MEDICINE

## 2025-05-02 PROCEDURE — G8417 CALC BMI ABV UP PARAM F/U: HCPCS | Performed by: FAMILY MEDICINE

## 2025-05-02 PROCEDURE — 99214 OFFICE O/P EST MOD 30 MIN: CPT | Performed by: FAMILY MEDICINE

## 2025-05-02 PROCEDURE — G8427 DOCREV CUR MEDS BY ELIG CLIN: HCPCS | Performed by: FAMILY MEDICINE

## 2025-05-02 PROCEDURE — 3074F SYST BP LT 130 MM HG: CPT | Performed by: FAMILY MEDICINE

## 2025-05-02 PROCEDURE — 2022F DILAT RTA XM EVC RTNOPTHY: CPT | Performed by: FAMILY MEDICINE

## 2025-05-02 NOTE — PROGRESS NOTES
25     Francine Sesay    : 1965 Sex: female   Age: 59 y.o.      Chief Complaint   Patient presents with    Hypertension       Prior to Admission medications    Medication Sig Start Date End Date Taking? Authorizing Provider   venlafaxine (EFFEXOR XR) 75 MG extended release capsule Take 1 capsule by mouth daily 3/31/25  Yes Yunior Oates DO   busPIRone (BUSPAR) 5 MG tablet Take 1 tablet by mouth 2 times daily 3/31/25  Yes Yunior Oates DO   Semaglutide, 1 MG/DOSE, (OZEMPIC) 4 MG/3ML SOPN sc injection Inject 1 mg into the skin every 7 days 3/31/25  Yes Yunior Oates DO   amitriptyline (ELAVIL) 25 MG tablet 1-2  po nightly 10/15/24  Yes Yunior Oates DO   metFORMIN (GLUCOPHAGE-XR) 500 MG extended release tablet TAKE 2 TABLETS IN THE MORNING AND 2 TABLETS AT DINNER 10/15/24  Yes Yunior Oates DO   pantoprazole (PROTONIX) 40 MG tablet Take 1 tablet by mouth daily 10/15/24  Yes Yunior Oates DO   rosuvastatin (CRESTOR) 5 MG tablet Take 1 tablet by mouth daily 10/15/24  Yes Yunior Oates DO   blood glucose monitor strips Test one times a day 3/27/24  Yes Yunior Oates DO   Lancets MISC 1 each by Does not apply route daily 23  Yes Yunior Oates DO   Omega-3 Fatty Acids (OMEGA-3 FISH OIL PO) Take by mouth   Yes Tu Juan MD   TURMERIC PO Take by mouth   Yes Tu Juan MD   acetaminophen (TYLENOL) 325 MG tablet Take 2 tablets by mouth every 6 hours as needed for Pain   Yes Tu Juan MD   Melatonin 10 MG TABS Take by mouth   Yes ProviderTu MD          HPI: Patient seen today with hypertension diabetes hyperlipidemia swallow dysfunction anxiety and reflux disease.  Medications reviewed well-tolerated maintain as prescribed.  Systems review stable.          Review of Systems   Constitutional: Negative.    HENT: Negative.     Eyes: Negative.    Respiratory: Negative.     Gastrointestinal: Negative.    Endocrine:

## 2025-06-10 ENCOUNTER — OFFICE VISIT (OUTPATIENT)
Dept: FAMILY MEDICINE CLINIC | Age: 60
End: 2025-06-10
Payer: COMMERCIAL

## 2025-06-10 VITALS
HEIGHT: 63 IN | DIASTOLIC BLOOD PRESSURE: 70 MMHG | TEMPERATURE: 97.8 F | SYSTOLIC BLOOD PRESSURE: 118 MMHG | WEIGHT: 214 LBS | HEART RATE: 102 BPM | OXYGEN SATURATION: 96 % | BODY MASS INDEX: 37.92 KG/M2

## 2025-06-10 DIAGNOSIS — E78.00 PURE HYPERCHOLESTEROLEMIA: ICD-10-CM

## 2025-06-10 DIAGNOSIS — E11.9 TYPE 2 DIABETES MELLITUS WITHOUT COMPLICATION, WITHOUT LONG-TERM CURRENT USE OF INSULIN (HCC): ICD-10-CM

## 2025-06-10 DIAGNOSIS — I10 ESSENTIAL HYPERTENSION: ICD-10-CM

## 2025-06-10 DIAGNOSIS — L08.9 SKIN INFECTION: Primary | ICD-10-CM

## 2025-06-10 LAB
ALBUMIN: 4.5 G/DL (ref 3.5–5.2)
ALP BLD-CCNC: 89 U/L (ref 35–104)
ALT SERPL-CCNC: 33 U/L (ref 0–35)
ANION GAP SERPL CALCULATED.3IONS-SCNC: 12 MMOL/L (ref 7–16)
AST SERPL-CCNC: 26 U/L (ref 0–35)
BASOPHILS ABSOLUTE: 0.04 K/UL (ref 0–0.2)
BASOPHILS RELATIVE PERCENT: 1 % (ref 0–2)
BILIRUB SERPL-MCNC: 0.3 MG/DL (ref 0–1.2)
BUN BLDV-MCNC: 15 MG/DL (ref 8–23)
CALCIUM SERPL-MCNC: 9.5 MG/DL (ref 8.8–10.2)
CHLORIDE BLD-SCNC: 103 MMOL/L (ref 98–107)
CHOLESTEROL, TOTAL: 141 MG/DL
CO2: 25 MMOL/L (ref 22–29)
CREAT SERPL-MCNC: 0.9 MG/DL (ref 0.5–1)
EOSINOPHILS ABSOLUTE: 0.48 K/UL (ref 0.05–0.5)
EOSINOPHILS RELATIVE PERCENT: 7 % (ref 0–6)
GFR, ESTIMATED: 69 ML/MIN/1.73M2
GLUCOSE BLD-MCNC: 137 MG/DL (ref 74–99)
HBA1C MFR BLD: 5.7 % (ref 4–5.6)
HCT VFR BLD CALC: 40 % (ref 34–48)
HDLC SERPL-MCNC: 38 MG/DL
HEMOGLOBIN: 13.5 G/DL (ref 11.5–15.5)
IMMATURE GRANULOCYTES %: 0 % (ref 0–5)
IMMATURE GRANULOCYTES ABSOLUTE: 0.03 K/UL (ref 0–0.58)
LDL CHOLESTEROL: 68 MG/DL
LYMPHOCYTES ABSOLUTE: 1.48 K/UL (ref 1.5–4)
LYMPHOCYTES RELATIVE PERCENT: 21 % (ref 20–42)
MCH RBC QN AUTO: 31.1 PG (ref 26–35)
MCHC RBC AUTO-ENTMCNC: 33.8 G/DL (ref 32–34.5)
MCV RBC AUTO: 92.2 FL (ref 80–99.9)
MONOCYTES ABSOLUTE: 0.58 K/UL (ref 0.1–0.95)
MONOCYTES RELATIVE PERCENT: 8 % (ref 2–12)
NEUTROPHILS ABSOLUTE: 4.34 K/UL (ref 1.8–7.3)
NEUTROPHILS RELATIVE PERCENT: 63 % (ref 43–80)
PDW BLD-RTO: 13.2 % (ref 11.5–15)
PLATELET # BLD: 383 K/UL (ref 130–450)
PMV BLD AUTO: 9.2 FL (ref 7–12)
POTASSIUM SERPL-SCNC: 4 MMOL/L (ref 3.5–5.1)
RBC # BLD: 4.34 M/UL (ref 3.5–5.5)
SODIUM BLD-SCNC: 140 MMOL/L (ref 136–145)
THYROXINE (T4): 6.1 UG/DL (ref 4.5–11.7)
TOTAL PROTEIN: 7.3 G/DL (ref 6.4–8.3)
TRIGL SERPL-MCNC: 171 MG/DL
TSH SERPL DL<=0.05 MIU/L-ACNC: 0.98 UIU/ML (ref 0.27–4.2)
VLDLC SERPL CALC-MCNC: 34 MG/DL
WBC # BLD: 7 K/UL (ref 4.5–11.5)

## 2025-06-10 PROCEDURE — 1036F TOBACCO NON-USER: CPT | Performed by: FAMILY MEDICINE

## 2025-06-10 PROCEDURE — 2022F DILAT RTA XM EVC RTNOPTHY: CPT | Performed by: FAMILY MEDICINE

## 2025-06-10 PROCEDURE — G8427 DOCREV CUR MEDS BY ELIG CLIN: HCPCS | Performed by: FAMILY MEDICINE

## 2025-06-10 PROCEDURE — G8417 CALC BMI ABV UP PARAM F/U: HCPCS | Performed by: FAMILY MEDICINE

## 2025-06-10 PROCEDURE — 3078F DIAST BP <80 MM HG: CPT | Performed by: FAMILY MEDICINE

## 2025-06-10 PROCEDURE — 3044F HG A1C LEVEL LT 7.0%: CPT | Performed by: FAMILY MEDICINE

## 2025-06-10 PROCEDURE — 3017F COLORECTAL CA SCREEN DOC REV: CPT | Performed by: FAMILY MEDICINE

## 2025-06-10 PROCEDURE — 99214 OFFICE O/P EST MOD 30 MIN: CPT | Performed by: FAMILY MEDICINE

## 2025-06-10 PROCEDURE — 3074F SYST BP LT 130 MM HG: CPT | Performed by: FAMILY MEDICINE

## 2025-06-10 RX ORDER — SULFAMETHOXAZOLE AND TRIMETHOPRIM 800; 160 MG/1; MG/1
1 TABLET ORAL 2 TIMES DAILY
Qty: 20 TABLET | Refills: 0 | Status: SHIPPED | OUTPATIENT
Start: 2025-06-10 | End: 2025-06-20

## 2025-06-10 RX ORDER — MUPIROCIN 2 %
OINTMENT (GRAM) TOPICAL
Qty: 30 G | Refills: 0 | Status: SHIPPED | OUTPATIENT
Start: 2025-06-10 | End: 2025-06-17

## 2025-06-10 NOTE — PROGRESS NOTES
Palpations: Abdomen is soft.   Musculoskeletal:         General: Normal range of motion.      Cervical back: Normal range of motion and neck supple.   Skin:     General: Skin is warm and dry.   Neurological:      Mental Status: She is alert and oriented to person, place, and time.   Psychiatric:         Behavior: Behavior normal.     Exam findings are stable.  Skin infection involving the face as noted.  Treatment as noted and then will reassess Friday.  Blood work to be completed prior.  Hypertension diabetes hyperlipidemia hypothyroid controlled.            Plan Per Assessment:  Francine was seen today for rash and poison ivy.    Diagnoses and all orders for this visit:    Skin infection    Essential hypertension  -     Comprehensive Metabolic Panel; Future  -     Hemoglobin A1C; Future  -     T4; Future  -     TSH; Future  -     Lipid Panel; Future  -     CBC with Auto Differential; Future    Type 2 diabetes mellitus without complication, without long-term current use of insulin (HCC)  -     Comprehensive Metabolic Panel; Future  -     Hemoglobin A1C; Future  -     T4; Future  -     TSH; Future  -     Lipid Panel; Future  -     CBC with Auto Differential; Future    Pure hypercholesterolemia  -     Comprehensive Metabolic Panel; Future  -     Hemoglobin A1C; Future  -     T4; Future  -     TSH; Future  -     Lipid Panel; Future  -     CBC with Auto Differential; Future    Other orders  -     mupirocin (BACTROBAN) 2 % ointment; Apply topically 3 times daily.  -     sulfamethoxazole-trimethoprim (BACTRIM DS;SEPTRA DS) 800-160 MG per tablet; Take 1 tablet by mouth 2 times daily for 10 days            No follow-ups on file.      Yunior Oates DO    Note was generated with the assistance of voice recognition software.  Document was reviewed however may contain grammatical errors.

## 2025-06-12 ENCOUNTER — OFFICE VISIT (OUTPATIENT)
Dept: PRIMARY CARE CLINIC | Age: 60
End: 2025-06-12
Payer: COMMERCIAL

## 2025-06-12 VITALS
OXYGEN SATURATION: 97 % | HEART RATE: 80 BPM | HEIGHT: 63 IN | TEMPERATURE: 97.4 F | DIASTOLIC BLOOD PRESSURE: 80 MMHG | SYSTOLIC BLOOD PRESSURE: 126 MMHG | BODY MASS INDEX: 37.92 KG/M2 | WEIGHT: 214 LBS

## 2025-06-12 DIAGNOSIS — L23.7 POISON IVY DERMATITIS: ICD-10-CM

## 2025-06-12 DIAGNOSIS — E11.9 TYPE 2 DIABETES MELLITUS WITHOUT COMPLICATION, WITHOUT LONG-TERM CURRENT USE OF INSULIN (HCC): ICD-10-CM

## 2025-06-12 DIAGNOSIS — I10 ESSENTIAL HYPERTENSION: Primary | ICD-10-CM

## 2025-06-12 DIAGNOSIS — E78.00 PURE HYPERCHOLESTEROLEMIA: ICD-10-CM

## 2025-06-12 PROCEDURE — 3079F DIAST BP 80-89 MM HG: CPT | Performed by: FAMILY MEDICINE

## 2025-06-12 PROCEDURE — 3017F COLORECTAL CA SCREEN DOC REV: CPT | Performed by: FAMILY MEDICINE

## 2025-06-12 PROCEDURE — 1036F TOBACCO NON-USER: CPT | Performed by: FAMILY MEDICINE

## 2025-06-12 PROCEDURE — 3074F SYST BP LT 130 MM HG: CPT | Performed by: FAMILY MEDICINE

## 2025-06-12 PROCEDURE — G8417 CALC BMI ABV UP PARAM F/U: HCPCS | Performed by: FAMILY MEDICINE

## 2025-06-12 PROCEDURE — 2022F DILAT RTA XM EVC RTNOPTHY: CPT | Performed by: FAMILY MEDICINE

## 2025-06-12 PROCEDURE — 3044F HG A1C LEVEL LT 7.0%: CPT | Performed by: FAMILY MEDICINE

## 2025-06-12 PROCEDURE — G8427 DOCREV CUR MEDS BY ELIG CLIN: HCPCS | Performed by: FAMILY MEDICINE

## 2025-06-12 PROCEDURE — 99214 OFFICE O/P EST MOD 30 MIN: CPT | Performed by: FAMILY MEDICINE

## 2025-06-12 RX ORDER — PREDNISONE 10 MG/1
TABLET ORAL
Qty: 30 TABLET | Refills: 0 | Status: SHIPPED | OUTPATIENT
Start: 2025-06-12

## 2025-06-12 NOTE — PROGRESS NOTES
Date     06/10/2025    K 4.0 06/10/2025     06/10/2025    CO2 25 06/10/2025    BUN 15 06/10/2025    CREATININE 0.9 06/10/2025    GLUCOSE 137 (H) 06/10/2025    CALCIUM 9.5 06/10/2025    BILITOT 0.3 06/10/2025    ALKPHOS 89 06/10/2025    AST 26 06/10/2025    ALT 33 06/10/2025    LABGLOM 69 06/10/2025    GFRAA >60 04/12/2022      No results found for: \"PSA\"   Lab Results   Component Value Date    LABA1C 5.7 (H) 06/10/2025    LABA1C 6.0 (H) 01/03/2025    LABA1C 6.4 08/23/2024     Lab Results   Component Value Date     01/22/2021     06/08/2020         Plan Per Assessment:  Francine was seen today for skin infection and hypertension.    Diagnoses and all orders for this visit:    Essential hypertension    Type 2 diabetes mellitus without complication, without long-term current use of insulin (HCC)    Poison ivy dermatitis    Pure hypercholesterolemia    Other orders  -     predniSONE (DELTASONE) 10 MG tablet; 3 qd x5days then 2 qd x5days then 1 qd x5days            Return in about 2 weeks (around 6/26/2025).      Yunior Oates,     Note was generated with the assistance of voice recognition software.  Document was reviewed however may contain grammatical errors.

## 2025-06-23 RX ORDER — SEMAGLUTIDE 1.34 MG/ML
1 INJECTION, SOLUTION SUBCUTANEOUS
Qty: 3 ML | Refills: 0 | Status: SHIPPED | OUTPATIENT
Start: 2025-06-23

## 2025-06-25 RX ORDER — SEMAGLUTIDE 1.34 MG/ML
1 INJECTION, SOLUTION SUBCUTANEOUS
Qty: 3 ML | Refills: 0 | OUTPATIENT
Start: 2025-06-25

## 2025-08-06 RX ORDER — SEMAGLUTIDE 1.34 MG/ML
1 INJECTION, SOLUTION SUBCUTANEOUS
Qty: 3 ML | Refills: 0 | Status: SHIPPED | OUTPATIENT
Start: 2025-08-06

## 2025-08-26 PROBLEM — Z86.73 HISTORY OF STROKE: Status: ACTIVE | Noted: 2022-02-07

## 2025-09-03 ENCOUNTER — TELEPHONE (OUTPATIENT)
Dept: PRIMARY CARE CLINIC | Age: 60
End: 2025-09-03

## 2025-09-03 DIAGNOSIS — R53.83 OTHER FATIGUE: ICD-10-CM

## 2025-09-03 DIAGNOSIS — E11.9 TYPE 2 DIABETES MELLITUS WITHOUT COMPLICATION, WITHOUT LONG-TERM CURRENT USE OF INSULIN (HCC): ICD-10-CM

## 2025-09-03 DIAGNOSIS — R74.8 ELEVATED LIVER ENZYMES: ICD-10-CM

## 2025-09-03 DIAGNOSIS — E78.00 PURE HYPERCHOLESTEROLEMIA: ICD-10-CM

## 2025-09-03 DIAGNOSIS — I10 ESSENTIAL HYPERTENSION: Primary | ICD-10-CM

## 2025-09-03 RX ORDER — SEMAGLUTIDE 1.34 MG/ML
1 INJECTION, SOLUTION SUBCUTANEOUS
Qty: 3 ML | Refills: 0 | Status: SHIPPED | OUTPATIENT
Start: 2025-09-03

## (undated) DEVICE — GRADUATE TRIANG MEASURE 1000ML BLK PRNT

## (undated) DEVICE — GLOVE,SURG,SIGNATURE LTX MICR,LTX,PF,7.0: Brand: MEDLINE

## (undated) DEVICE — BLOCK BITE 60FR RUBBER ADLT DENTAL

## (undated) DEVICE — ESOPHAGEAL BALLOON DILATATION CATHETER: Brand: CRE FIXED WIRE

## (undated) DEVICE — SPONGE GZ W4XL4IN RAYON POLY CVR W/NONWOVEN FAB STRL 2/PK

## (undated) DEVICE — FORCEPS BX OVL CUP FEN DISPOSABLE CAP L 160CM RAD JAW 4

## (undated) DEVICE — SYRINGE INFL 60ML DISP ALLIANCE II